# Patient Record
Sex: FEMALE | Race: WHITE | NOT HISPANIC OR LATINO | Employment: OTHER | URBAN - METROPOLITAN AREA
[De-identification: names, ages, dates, MRNs, and addresses within clinical notes are randomized per-mention and may not be internally consistent; named-entity substitution may affect disease eponyms.]

---

## 2017-01-02 ENCOUNTER — HOSPITAL ENCOUNTER (OUTPATIENT)
Dept: CT IMAGING | Facility: CLINIC | Age: 82
Discharge: HOME | End: 2017-01-02
Attending: INTERNAL MEDICINE | Admitting: INTERNAL MEDICINE
Payer: MEDICARE

## 2017-01-02 ENCOUNTER — OFFICE VISIT (OUTPATIENT)
Dept: INTERNAL MEDICINE | Facility: CLINIC | Age: 82
End: 2017-01-02
Payer: MEDICARE

## 2017-01-02 VITALS
DIASTOLIC BLOOD PRESSURE: 78 MMHG | OXYGEN SATURATION: 96 % | HEIGHT: 58 IN | TEMPERATURE: 97.6 F | BODY MASS INDEX: 24.98 KG/M2 | WEIGHT: 119 LBS | SYSTOLIC BLOOD PRESSURE: 128 MMHG | HEART RATE: 58 BPM

## 2017-01-02 DIAGNOSIS — R10.84 ABDOMINAL PAIN, GENERALIZED: ICD-10-CM

## 2017-01-02 DIAGNOSIS — R10.84 ABDOMINAL PAIN, GENERALIZED: Primary | ICD-10-CM

## 2017-01-02 DIAGNOSIS — D72.829 LEUKOCYTOSIS, UNSPECIFIED TYPE: ICD-10-CM

## 2017-01-02 DIAGNOSIS — E22.2 SIADH (SYNDROME OF INAPPROPRIATE ADH PRODUCTION) (H): ICD-10-CM

## 2017-01-02 LAB
ALBUMIN SERPL-MCNC: 3.4 G/DL (ref 3.4–5)
ALBUMIN UR-MCNC: NEGATIVE MG/DL
ALP SERPL-CCNC: 131 U/L (ref 40–150)
ALT SERPL W P-5'-P-CCNC: 18 U/L (ref 0–50)
ANION GAP SERPL CALCULATED.3IONS-SCNC: 10 MMOL/L (ref 3–14)
APPEARANCE UR: CLEAR
AST SERPL W P-5'-P-CCNC: 18 U/L (ref 0–45)
BASOPHILS # BLD AUTO: 0.1 10E9/L (ref 0–0.2)
BASOPHILS NFR BLD AUTO: 0.8 %
BILIRUB SERPL-MCNC: 0.4 MG/DL (ref 0.2–1.3)
BILIRUB UR QL STRIP: NEGATIVE
BUN SERPL-MCNC: 7 MG/DL (ref 7–30)
CALCIUM SERPL-MCNC: 9.7 MG/DL (ref 8.5–10.1)
CHLORIDE SERPL-SCNC: 94 MMOL/L (ref 94–109)
CO2 SERPL-SCNC: 28 MMOL/L (ref 20–32)
COLOR UR AUTO: YELLOW
CREAT SERPL-MCNC: 0.6 MG/DL (ref 0.52–1.04)
DIFFERENTIAL METHOD BLD: ABNORMAL
EOSINOPHIL # BLD AUTO: 0.5 10E9/L (ref 0–0.7)
EOSINOPHIL NFR BLD AUTO: 3.4 %
ERYTHROCYTE [DISTWIDTH] IN BLOOD BY AUTOMATED COUNT: 13.8 % (ref 10–15)
GFR SERPL CREATININE-BSD FRML MDRD: ABNORMAL ML/MIN/1.7M2
GLUCOSE SERPL-MCNC: 100 MG/DL (ref 70–99)
GLUCOSE UR STRIP-MCNC: NEGATIVE MG/DL
HCT VFR BLD AUTO: 41.7 % (ref 35–47)
HGB BLD-MCNC: 13.4 G/DL (ref 11.7–15.7)
HGB UR QL STRIP: NEGATIVE
KETONES UR STRIP-MCNC: NEGATIVE MG/DL
LEUKOCYTE ESTERASE UR QL STRIP: ABNORMAL
LYMPHOCYTES # BLD AUTO: 1.9 10E9/L (ref 0.8–5.3)
LYMPHOCYTES NFR BLD AUTO: 14.1 %
MCH RBC QN AUTO: 29.6 PG (ref 26.5–33)
MCHC RBC AUTO-ENTMCNC: 32.1 G/DL (ref 31.5–36.5)
MCV RBC AUTO: 92 FL (ref 78–100)
MONOCYTES # BLD AUTO: 1.6 10E9/L (ref 0–1.3)
MONOCYTES NFR BLD AUTO: 11.8 %
MUCOUS THREADS #/AREA URNS LPF: PRESENT /LPF
NEUTROPHILS # BLD AUTO: 9.6 10E9/L (ref 1.6–8.3)
NEUTROPHILS NFR BLD AUTO: 69.9 %
NITRATE UR QL: NEGATIVE
PH UR STRIP: 7.5 PH (ref 5–7)
PLATELET # BLD AUTO: 385 10E9/L (ref 150–450)
POTASSIUM SERPL-SCNC: 4.1 MMOL/L (ref 3.4–5.3)
PROT SERPL-MCNC: 8.1 G/DL (ref 6.8–8.8)
RBC # BLD AUTO: 4.53 10E12/L (ref 3.8–5.2)
RBC #/AREA URNS AUTO: ABNORMAL /HPF (ref 0–2)
SODIUM SERPL-SCNC: 132 MMOL/L (ref 133–144)
SP GR UR STRIP: 1.01 (ref 1–1.03)
URN SPEC COLLECT METH UR: ABNORMAL
UROBILINOGEN UR STRIP-ACNC: 0.2 EU/DL (ref 0.2–1)
WBC # BLD AUTO: 13.7 10E9/L (ref 4–11)
WBC #/AREA URNS AUTO: ABNORMAL /HPF (ref 0–2)

## 2017-01-02 PROCEDURE — 85025 COMPLETE CBC W/AUTO DIFF WBC: CPT | Performed by: INTERNAL MEDICINE

## 2017-01-02 PROCEDURE — 25500064 ZZH RX 255 OP 636: Performed by: INTERNAL MEDICINE

## 2017-01-02 PROCEDURE — 36415 COLL VENOUS BLD VENIPUNCTURE: CPT | Performed by: INTERNAL MEDICINE

## 2017-01-02 PROCEDURE — 99215 OFFICE O/P EST HI 40 MIN: CPT | Performed by: INTERNAL MEDICINE

## 2017-01-02 PROCEDURE — 81001 URINALYSIS AUTO W/SCOPE: CPT | Performed by: INTERNAL MEDICINE

## 2017-01-02 PROCEDURE — 25000125 ZZHC RX 250: Performed by: INTERNAL MEDICINE

## 2017-01-02 PROCEDURE — 74177 CT ABD & PELVIS W/CONTRAST: CPT

## 2017-01-02 PROCEDURE — 80053 COMPREHEN METABOLIC PANEL: CPT | Performed by: INTERNAL MEDICINE

## 2017-01-02 RX ORDER — IOPAMIDOL 755 MG/ML
58 INJECTION, SOLUTION INTRAVASCULAR ONCE
Status: COMPLETED | OUTPATIENT
Start: 2017-01-02 | End: 2017-01-02

## 2017-01-02 RX ADMIN — SODIUM CHLORIDE 57 ML: 9 INJECTION, SOLUTION INTRAVENOUS at 13:14

## 2017-01-02 RX ADMIN — IOPAMIDOL 58 ML: 755 INJECTION, SOLUTION INTRAVENOUS at 13:14

## 2017-01-02 NOTE — PROGRESS NOTES
SUBJECTIVE:                                                    Steffany Brown is a 86 year old female who presents to clinic today for the following health issues:      Concern - Stomach is painful to the touch     Onset: 2 months     Description:   When she touches her stomach it is painful no internal symptoms just external    Intensity: mild    Progression of Symptoms:  same    Accompanying Signs & Symptoms:  none       Previous history of similar problem:   none    Precipitating factors:   Worsened by: Touching    Alleviating factors:  Improved by: none    Abdominal pain on both sides for past several weeks.   Did not want to tell me about it when last seen.   BMs formed, once per day, no diarrhea, no hematochezia, no melena.  No gas production  Discomfort/pain across entire abdomen, present all day, does not vary throughout the day.   No nausea, no vomiting, no GERD.   No fevers, no chills.   NO changes in urination.     No changes with food or eating.  No changes with passing gas, or BMs.    No changes with tylenol, TUMS,   Has not had this pain before.   Sx do not cause her to alter her day or prevent her from normal activities.   Already limited due to the knee.           Therapies Tried and outcome: none          Problem list and histories reviewed & adjusted, as indicated.  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     Other speech disturbance 3/05     chronic dysarthria since CVI     Other and unspecified hyperlipidemia      Hip  fracture, intertrochanteric (H) 9/10/09     left hip fracture, S/P ORIF     Psoriasis      History of small bowel obstruction ,      Eczema      Pelvic mass in female 10/3/11     s/p BONIFACIO/BSO; pathology:  Serous cystadenofibroma, 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     Family history of colon cancer      mother  from colon cancer age 89     Age-related macular degeneration, dry, both eyes 10/24/14     per [facility]       Past Surgical History:  ---------------------------  Past Surgical History   Procedure Laterality Date     Hc colonoscopy thru stoma w biopsy/cautery tumor/polyp/lesion  3/00     colonoscopy adenamatous polyp ([facility])     Stress [place])       stress echo (negative)     C appendectomy       Surgical history of -   3/12/05     attempted angioplasty/stent of right middle cerebral artery ( M1 segment), no effective     Rectal surgery  1971     anterior laproscopic repair of sigmoid prolapse     Small intestine surgery       Colonoscopy  03     Diverticulosis, single small polyp removed (at [facility])     Colonoscopy  08     diverticulosis, few scattered mild angioectasias, no polyps     Fracture tx, hip rt/lt  9/10/09     Open reduction with IM nailing of left hip fracture using a gamma nail     Hysterectomy, bonifacio  10/03/11     s/p BONIFACIO/BSO to remove pelvic mass; pathology:  Serous cystadenofibroma, 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     D & c       Colonoscopy  2013     Procedure: COLONOSCOPY;  COLONOSCOPY ;  Surgeon: Romario Watters MD;  Location:  GI     Open reduction internal fixation ankle  2014     Procedure: OPEN REDUCTION INTERNAL FIXATION ANKLE;  Surgeon: Emmanuel Gomez MD;  Location:  OR     Open reduction internal fixation hip nailing Right 2016     Procedure: OPEN REDUCTION INTERNAL FIXATION HIP NAILING;  Surgeon: Jeronimo Giordano MD;  Location:  OR       Current Medications:  ---------------------------  Current  Outpatient Prescriptions   Medication Sig Dispense Refill     amoxicillin-clavulanate (AUGMENTIN) 875-125 MG per tablet Take 1 tablet by mouth 2 times daily for 10 days 20 tablet 0     clopidogrel (PLAVIX) 75 MG tablet Take 1 tablet (75 mg) by mouth daily 90 tablet 1     acetaminophen (TYLENOL) 500 MG tablet Take 1,000 mg by mouth 4 times daily       cholecalciferol 2000 UNITS CAPS Take 2 capsules by mouth 2 times daily        Multiple Vitamins-Minerals (PRESERVISION AREDS) CAPS Take 1 capsule by mouth 2 times daily        lovastatin (MEVACOR) 40 MG tablet Take 1 tablet (40 mg) by mouth At Bedtime 90 tablet 3     atenolol (TENORMIN) 50 MG tablet Take 1 tablet (50 mg) by mouth daily 90 tablet 3     fluticasone (FLONASE) 50 MCG/ACT nasal spray Spray 2 sprays into both nostrils daily as needed for rhinitis 16 g 5     loratadine (CLARITIN) 10 MG tablet Take 10 mg by mouth daily         Allergies:  -------------  Allergies   Allergen Reactions     Atorvastatin Calcium Cramps     Leg cramps     Lisinopril Swelling and Rash       Social History:  -------------------  Social History     Social History     Marital Status: Single     Spouse Name: N/A     Number of Children: 0     Years of Education: N/A     Occupational History     [employer]     Social History Main Topics     Smoking status: Never Smoker      Smokeless tobacco: Never Used      Comment: pt quit smoking in      Alcohol Use: Yes      Comment: rare     Drug Use: No     Sexual Activity: No     Other Topics Concern     Not on file     Social History Narrative    Living arrangements - the patient lives with her friendKamini       Family Medical History:  ------------------------------  Family History   Problem Relation Age of Onset     CANCER Mother       of colon ca age 89     Cancer - colorectal Mother      Cardiovascular Father       at age 65     CANCER Brother       of lung ca age 65         ROS:  REVIEW  "OF SYSTEMS:    RESP: negative for cough, dyspnea, wheezing, hemoptysis  CV: negative for chest pain, palpitations, PND, FENG, orthopnea  GI: negative for dysphagia, N/V, melena, diarrhea and constipation  CONSTITUTIONAL:negative for fever, chills, sweats, weight loss  EYES: negative for double vision, pain, blurring  ENT/MOUTH: negative for congestion, rhinorrhea, PND, ear pain, sore throat  : negative for dysuria, polyuria, hematuria, genital d/c  MUSCULOSKELATAL: negative for arthralgias, joint swelling or stiffness, weakness, muscular weakness  INTEGUMENTARY/SKIN: negative for new rashes, pruritus, scaling, alopecia  NEURO: negative for numbness/tingling, paralysis, incoordination or weakness  ENDOCRINE: negative for goiter, cold or heat intolerance, polydipsia, night sweats.     OBJECTIVE:                                                    /78 mmHg  Pulse 58  Temp(Src) 97.6  F (36.4  C) (Oral)  Ht 4' 10\" (1.473 m)  Wt 119 lb (53.978 kg)  BMI 24.88 kg/m2  SpO2 96%     GENERAL alert and no distress  EYES:  Normal sclera,conjunctiva, EOMI  HENT: oral and posterior pharynx without lesions or erythema, facies symmetric  NECK: Neck supple. No LAD, without thyroidmegaly or JVD., Carotids without bruits.  RESP: Clear to ausculation bilaterally without wheezes or crackles. Normal BS in all fields.  CV: RRR normal S1S2 without murmurs, rubs or gallops. PMI normal  LYMPH: no cervical lymph adenopathy appreciated  MS: extremities- no gross deformities of the visible extremities noted, no edema  PSYCH: Alert and oriented times 3; speech- coherent  SKIN:  No obvious significant skin lesions on visible portions of face  ABD:  Soft, diffuse mild tenderness across entire abdomen, no rebound, no distention.  Normal bowel sounds    LABS:  Results for orders placed or performed in visit on 01/02/17   CBC with platelets and differential   Result Value Ref Range    WBC 13.7 (H) 4.0 - 11.0 10e9/L    RBC Count 4.53 3.8 - " 5.2 10e12/L    Hemoglobin 13.4 11.7 - 15.7 g/dL    Hematocrit 41.7 35.0 - 47.0 %    MCV 92 78 - 100 fl    MCH 29.6 26.5 - 33.0 pg    MCHC 32.1 31.5 - 36.5 g/dL    RDW 13.8 10.0 - 15.0 %    Platelet Count 385 150 - 450 10e9/L    Diff Method Automated Method     % Neutrophils 69.9 %    % Lymphocytes 14.1 %    % Monocytes 11.8 %    % Eosinophils 3.4 %    % Basophils 0.8 %    Absolute Neutrophil 9.6 (H) 1.6 - 8.3 10e9/L    Absolute Lymphocytes 1.9 0.8 - 5.3 10e9/L    Absolute Monocytes 1.6 (H) 0.0 - 1.3 10e9/L    Absolute Eosinophils 0.5 0.0 - 0.7 10e9/L    Absolute Basophils 0.1 0.0 - 0.2 10e9/L   Comprehensive metabolic panel (BMP + Alb, Alk Phos, ALT, AST, Total. Bili, TP)   Result Value Ref Range    Sodium 132 (L) 133 - 144 mmol/L    Potassium 4.1 3.4 - 5.3 mmol/L    Chloride 94 94 - 109 mmol/L    Carbon Dioxide 28 20 - 32 mmol/L    Anion Gap 10 3 - 14 mmol/L    Glucose 100 (H) 70 - 99 mg/dL    Urea Nitrogen 7 7 - 30 mg/dL    Creatinine 0.60 0.52 - 1.04 mg/dL    GFR Estimate >90  Non  GFR Calc   >60 mL/min/1.7m2    GFR Estimate If Black >90   GFR Calc   >60 mL/min/1.7m2    Calcium 9.7 8.5 - 10.1 mg/dL    Bilirubin Total 0.4 0.2 - 1.3 mg/dL    Albumin 3.4 3.4 - 5.0 g/dL    Protein Total 8.1 6.8 - 8.8 g/dL    Alkaline Phosphatase 131 40 - 150 U/L    ALT 18 0 - 50 U/L    AST 18 0 - 45 U/L   *UA reflex to Microscopic and Culture ([facility] and [facility] (except [place] and [place])   Result Value Ref Range    Color Urine Yellow     Appearance Urine Clear     Glucose Urine Negative NEG mg/dL    Bilirubin Urine Negative NEG    Ketones Urine Negative NEG mg/dL    Specific Gravity Urine 1.010 1.003 - 1.035    Blood Urine Negative NEG    pH Urine 7.5 (H) 5.0 - 7.0 pH    Protein Albumin Urine Negative NEG mg/dL    Urobilinogen Urine 0.2 0.2 - 1.0 EU/dL    Nitrite Urine Negative NEG    Leukocyte Esterase Urine Small (A) NEG    Source Midstream Urine    Urine Microscopic    Result Value Ref Range    WBC Urine O - 2 0 - 2 /HPF    RBC Urine O - 2 0 - 2 /HPF    Mucous Urine Present (A) NEG /LPF       ABD CT scan (spoek with radiologist):  No obvious cause for her pain, no cancer, no abnormal masses.      ASSESSMENT/PLAN:                                                      (R10.84) Abdominal pain, generalized  (primary encounter diagnosis)  Comment: Unclear reason for her discomfort.    Extensive possible differential diagnoses.   Labs normal , except mildly elevated WBC with left shift, but minimal symptoms for infection  ABD Ct showed nothing significant.  The patient was most worried about colon cancer, givne her mothers colon cancer at age 89.   It is posible this could be mild case of smoldering diverticulitis, despite negative CT scan.   WIll treat with course of augmentin for next 10 days.   Also ask her to take tylenolol on regular basis.   Told to contact me if any worsening and with update regardless in 10-14 days.   Plan: CBC with platelets and differential,         Comprehensive metabolic panel (BMP + Alb, Alk         Phos, ALT, AST, Total. 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 Bili, TP)            (D72.829) Leukocytosis, unspecified type  Comment:   Plan: amoxicillin-clavulanate (AUGMENTIN) 875-125 MG         per tablet               See Patient Instructions    TONY SINGH M.D., MD  [facility]     Spent greater than 45 minutes with pt and partner, greater than 50% of time was educational and counseling.

## 2017-01-02 NOTE — NURSING NOTE
"Chief Complaint   Patient presents with     Pain     stomach is painful to the touch       Initial /78 mmHg  Pulse 58  Temp(Src) 97.6  F (36.4  C) (Oral)  Ht 4' 10\" (1.473 m)  Wt 119 lb (53.978 kg)  BMI 24.88 kg/m2  SpO2 96% Estimated body mass index is 24.88 kg/(m^2) as calculated from the following:    Height as of this encounter: 4' 10\" (1.473 m).    Weight as of this encounter: 119 lb (53.978 kg).  BP completed using cuff size: regular    "

## 2017-01-11 ENCOUNTER — APPOINTMENT (OUTPATIENT)
Dept: CT IMAGING | Facility: CLINIC | Age: 82
End: 2017-01-11
Attending: EMERGENCY MEDICINE
Payer: MEDICARE

## 2017-01-11 ENCOUNTER — APPOINTMENT (OUTPATIENT)
Dept: GENERAL RADIOLOGY | Facility: CLINIC | Age: 82
End: 2017-01-11
Attending: EMERGENCY MEDICINE
Payer: MEDICARE

## 2017-01-11 ENCOUNTER — HOSPITAL ENCOUNTER (OUTPATIENT)
Facility: CLINIC | Age: 82
Setting detail: OBSERVATION
Discharge: HOME | End: 2017-01-12
Attending: EMERGENCY MEDICINE | Admitting: INTERNAL MEDICINE
Payer: MEDICARE

## 2017-01-11 DIAGNOSIS — M79.604 PAIN OF RIGHT LOWER EXTREMITY: ICD-10-CM

## 2017-01-11 DIAGNOSIS — R10.31 RLQ ABDOMINAL PAIN: ICD-10-CM

## 2017-01-11 DIAGNOSIS — M25.571 ACUTE RIGHT ANKLE PAIN: Primary | ICD-10-CM

## 2017-01-11 DIAGNOSIS — R26.2 INABILITY TO WALK: ICD-10-CM
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
## 2017-01-11 LAB
ALBUMIN SERPL-MCNC: 3.4 G/DL (ref 3.4–5)
ALBUMIN UR-MCNC: NEGATIVE MG/DL
ALP SERPL-CCNC: 110 U/L (ref 40–150)
ALT SERPL W P-5'-P-CCNC: 15 U/L (ref 0–50)
ANION GAP SERPL CALCULATED.3IONS-SCNC: 7 MMOL/L (ref 3–14)
APPEARANCE UR: CLEAR
APTT PPP: 35 SEC (ref 22–37)
AST SERPL W P-5'-P-CCNC: 23 U/L (ref 0–45)
BASOPHILS # BLD AUTO: 0.1 10E9/L (ref 0–0.2)
BASOPHILS NFR BLD AUTO: 0.4 %
BILIRUB DIRECT SERPL-MCNC: <0.1 MG/DL (ref 0–0.2)
BILIRUB SERPL-MCNC: 0.4 MG/DL (ref 0.2–1.3)
BILIRUB UR QL STRIP: NEGATIVE
BUN SERPL-MCNC: 7 MG/DL (ref 7–30)
CALCIUM SERPL-MCNC: 9.6 MG/DL (ref 8.5–10.1)
CHLORIDE SERPL-SCNC: 97 MMOL/L (ref 94–109)
CO2 SERPL-SCNC: 27 MMOL/L (ref 20–32)
COLOR UR AUTO: YELLOW
CREAT SERPL-MCNC: 0.57 MG/DL (ref 0.52–1.04)
DIFFERENTIAL METHOD BLD: ABNORMAL
EOSINOPHIL # BLD AUTO: 0.5 10E9/L (ref 0–0.7)
EOSINOPHIL NFR BLD AUTO: 3.1 %
ERYTHROCYTE [DISTWIDTH] IN BLOOD BY AUTOMATED COUNT: 13.2 % (ref 10–15)
GFR SERPL CREATININE-BSD FRML MDRD: ABNORMAL ML/MIN/1.7M2
GLUCOSE SERPL-MCNC: 105 MG/DL (ref 70–99)
GLUCOSE UR STRIP-MCNC: NEGATIVE MG/DL
HCT VFR BLD AUTO: 40.4 % (ref 35–47)
HGB BLD-MCNC: 13.9 G/DL (ref 11.7–15.7)
HGB UR QL STRIP: NEGATIVE
IMM GRANULOCYTES # BLD: 0.1 10E9/L (ref 0–0.4)
IMM GRANULOCYTES NFR BLD: 0.5 %
INR PPP: 0.98 (ref 0.86–1.14)
KETONES UR STRIP-MCNC: NEGATIVE MG/DL
LEUKOCYTE ESTERASE UR QL STRIP: NEGATIVE
LYMPHOCYTES # BLD AUTO: 1.7 10E9/L (ref 0.8–5.3)
LYMPHOCYTES NFR BLD AUTO: 11 %
MCH RBC QN AUTO: 30.6 PG (ref 26.5–33)
MCHC RBC AUTO-ENTMCNC: 34.4 G/DL (ref 31.5–36.5)
MCV RBC AUTO: 89 FL (ref 78–100)
MONOCYTES # BLD AUTO: 1.8 10E9/L (ref 0–1.3)
MONOCYTES NFR BLD AUTO: 11.9 %
NEUTROPHILS # BLD AUTO: 11 10E9/L (ref 1.6–8.3)
NEUTROPHILS NFR BLD AUTO: 73.1 %
NITRATE UR QL: NEGATIVE
NRBC # BLD AUTO: 0 10*3/UL
NRBC BLD AUTO-RTO: 0 /100
PH UR STRIP: 7.5 PH (ref 5–7)
PLATELET # BLD AUTO: 355 10E9/L (ref 150–450)
POTASSIUM SERPL-SCNC: 4.1 MMOL/L (ref 3.4–5.3)
PROT SERPL-MCNC: 8 G/DL (ref 6.8–8.8)
RBC # BLD AUTO: 4.54 10E12/L (ref 3.8–5.2)
SODIUM SERPL-SCNC: 131 MMOL/L (ref 133–144)
SP GR UR STRIP: 1.01 (ref 1–1.03)
URN SPEC COLLECT METH UR: ABNORMAL
UROBILINOGEN UR STRIP-ACNC: 0.2 EU/DL (ref 0.2–1)
WBC # BLD AUTO: 15.1 10E9/L (ref 4–11)

## 2017-01-11 PROCEDURE — 73560 X-RAY EXAM OF KNEE 1 OR 2: CPT | Mod: RT

## 2017-01-11 PROCEDURE — 99285 EMERGENCY DEPT VISIT HI MDM: CPT | Mod: 25

## 2017-01-11 PROCEDURE — 80048 BASIC METABOLIC PNL TOTAL CA: CPT | Performed by: EMERGENCY MEDICINE

## 2017-01-11 PROCEDURE — A9270 NON-COVERED ITEM OR SERVICE: HCPCS | Mod: GY | Performed by: INTERNAL MEDICINE

## 2017-01-11 PROCEDURE — 85730 THROMBOPLASTIN TIME PARTIAL: CPT | Performed by: EMERGENCY MEDICINE

## 2017-01-11 PROCEDURE — 93005 ELECTROCARDIOGRAM TRACING: CPT

## 2017-01-11 PROCEDURE — 25500064 ZZH RX 255 OP 636: Performed by: EMERGENCY MEDICINE

## 2017-01-11 PROCEDURE — G0378 HOSPITAL OBSERVATION PER HR: HCPCS

## 2017-01-11 PROCEDURE — 85610 PROTHROMBIN TIME: CPT | Performed by: EMERGENCY MEDICINE

## 2017-01-11 PROCEDURE — 85025 COMPLETE CBC W/AUTO DIFF WBC: CPT | Performed by: EMERGENCY MEDICINE

## 2017-01-11 PROCEDURE — 74177 CT ABD & PELVIS W/CONTRAST: CPT

## 2017-01-11 PROCEDURE — 25000125 ZZHC RX 250: Performed by: EMERGENCY MEDICINE

## 2017-01-11 PROCEDURE — 72170 X-RAY EXAM OF PELVIS: CPT

## 2017-01-11 PROCEDURE — 99220 ZZC INITIAL OBSERVATION CARE,LEVL III: CPT | Performed by: INTERNAL MEDICINE

## 2017-01-11 PROCEDURE — 51798 US URINE CAPACITY MEASURE: CPT

## 2017-01-11 PROCEDURE — 72100 X-RAY EXAM L-S SPINE 2/3 VWS: CPT

## 2017-01-11 PROCEDURE — 25000132 ZZH RX MED GY IP 250 OP 250 PS 637: Mod: GY | Performed by: INTERNAL MEDICINE

## 2017-01-11 PROCEDURE — 73610 X-RAY EXAM OF ANKLE: CPT | Mod: RT

## 2017-01-11 PROCEDURE — 80076 HEPATIC FUNCTION PANEL: CPT | Performed by: EMERGENCY MEDICINE

## 2017-01-11 PROCEDURE — 70450 CT HEAD/BRAIN W/O DYE: CPT

## 2017-01-11 PROCEDURE — 81003 URINALYSIS AUTO W/O SCOPE: CPT | Performed by: EMERGENCY MEDICINE

## 2017-01-11 PROCEDURE — 71020 XR CHEST 2 VW: CPT

## 2017-01-11 RX ORDER — LIDOCAINE 40 MG/G
CREAM TOPICAL
Status: DISCONTINUED | OUTPATIENT
Start: 2017-01-11 | End: 2017-01-12 | Stop reason: HOSPADM

## 2017-01-11 RX ORDER — ONDANSETRON 4 MG/1
4 TABLET, ORALLY DISINTEGRATING ORAL EVERY 6 HOURS PRN
Status: DISCONTINUED | OUTPATIENT
Start: 2017-01-11 | End: 2017-01-12 | Stop reason: HOSPADM

## 2017-01-11 RX ORDER — IOPAMIDOL 755 MG/ML
64 INJECTION, SOLUTION INTRAVASCULAR ONCE
Status: COMPLETED | OUTPATIENT
Start: 2017-01-11 | End: 2017-01-11

## 2017-01-11 RX ORDER — NALOXONE HYDROCHLORIDE 0.4 MG/ML
.1-.4 INJECTION, SOLUTION INTRAMUSCULAR; INTRAVENOUS; SUBCUTANEOUS
Status: DISCONTINUED | OUTPATIENT
Start: 2017-01-11 | End: 2017-01-12 | Stop reason: HOSPADM

## 2017-01-11 RX ORDER — CLOPIDOGREL BISULFATE 75 MG/1
75 TABLET ORAL DAILY
Status: DISCONTINUED | OUTPATIENT
Start: 2017-01-12 | End: 2017-01-12 | Stop reason: HOSPADM

## 2017-01-11 RX ORDER — ACETAMINOPHEN 325 MG/1
650 TABLET ORAL EVERY 4 HOURS PRN
Status: DISCONTINUED | OUTPATIENT
Start: 2017-01-11 | End: 2017-01-12 | Stop reason: HOSPADM

## 2017-01-11 RX ORDER — ONDANSETRON 2 MG/ML
4 INJECTION INTRAMUSCULAR; INTRAVENOUS EVERY 6 HOURS PRN
Status: DISCONTINUED | OUTPATIENT
Start: 2017-01-11 | End: 2017-01-12 | Stop reason: HOSPADM

## 2017-01-11 RX ORDER — PROCHLORPERAZINE MALEATE 5 MG
5 TABLET ORAL EVERY 6 HOURS PRN
Status: DISCONTINUED | OUTPATIENT
Start: 2017-01-11 | End: 2017-01-12 | Stop reason: HOSPADM

## 2017-01-11 RX ORDER — PROCHLORPERAZINE 25 MG
12.5 SUPPOSITORY, RECTAL RECTAL EVERY 12 HOURS PRN
Status: DISCONTINUED | OUTPATIENT
Start: 2017-01-11 | End: 2017-01-12 | Stop reason: HOSPADM

## 2017-01-11 RX ADMIN — SODIUM CHLORIDE 58 ML: 9 INJECTION, SOLUTION INTRAVENOUS at 20:45

## 2017-01-11 RX ADMIN — IOPAMIDOL 64 ML: 755 INJECTION, SOLUTION INTRAVENOUS at 20:45

## 2017-01-11 RX ADMIN — AMOXICILLIN AND CLAVULANATE POTASSIUM 1 TABLET: 875; 125 TABLET, FILM COATED ORAL at 23:19

## 2017-01-11 ASSESSMENT — ENCOUNTER SYMPTOMS: BACK PAIN: 1

## 2017-01-11 NOTE — ED AVS SNAPSHOT
MRN:[id number]                      After Visit Summary   1/11/2017    Steffany Brown    MRN: [id number]           Thank you!     Thank you for choosing [facility] for your care. Our goal is always to provide you with excellent care. Hearing back from our patients is one way we can continue to improve our services. Please take a few minutes to complete the written survey that you may receive in the mail after you visit with us. Thank you!        Patient Information     Date Of Birth          8/24/1930        About your hospital stay     You were admitted on:  January 11, 2017 [facility]    You were discharged on:  January 12, 2017        Reason for your hospital stay       You were here for evaluation of ankle pain. Xray was negative for signs of a fracture and your mobility improved throughout admission.                  Who to Call     For medical emergencies, please call 911.  For non-urgent questions about your medical care, please call your primary care provider or clinic, [phone]          Attending Provider     Provider    Jon Molina DO Rumicho, Kuwe Edossa, MD       Primary Care Provider Office Phone # Fax #    Leo Hancock [phone]       [facility] [address] [facility] [address]        After Care Instructions     Activity       Your activity upon discharge: activity as tolerated            Diet       Follow this diet upon discharge: Orders Placed This Encounter  Regular Diet Adult              Discharge Instructions       Use ice and tylenol for pain management at home.   Try using the bathroom on a schedule every 3 hours or so to keep toileting regular.                  Follow-up Appointments     Follow-up and recommended labs and tests       Follow up with primary care provider, Leo Hancock within 7 days for hospital follow up.                  Your next 10  appointments already scheduled     Jan 18, 2017  9:20 AM   Office Visit with Leo Hancock MD   [facility] ([facility])    [address] [address] [phone]           Bring a current list of meds and any records pertaining to this visit.  For Physicals, please bring immunization records and any forms needing to be filled out.  Please arrive 10 minutes early to complete paperwork.              Additional Services     Home Care PT Referral for Hospital Discharge       PT to eval and treat    Your provider has ordered home care - physical therapy. If you have not been contacted within 2 days of your discharge please call the department phone number listed on the top of this document.                  Pending Results     No orders found for last 2 day(s).            Admission Information        Provider Department Dept Phone    1/11/2017 Arron Paris MD [facility] [phone]      Your Vitals Were     Blood Pressure Temperature Respirations    152/66 mmHg 96.2  F (35.7  C) (Oral) 18    Height Weight BMI (Body Mass Index)    1.524 m (5') 58.514 kg (129 lb) 25.19 kg/m2    Pulse Oximetry          92%        MyChart Information     [facility] gives you secure access to your electronic health record. If you see a primary care provider, you can also send messages to your care team and make appointments. If you have questions, please call your primary care clinic.  If you do not have a primary care provider, please call [phone] and they will assist you.        Care EveryWhere ID     This is your Care EveryWhere ID. This could be used by other organizations to access your [place] medical records  [id number]           Review of your medicines      CONTINUE these medicines which have NOT CHANGED        Dose / Directions    acetaminophen 500 MG tablet   Commonly known as:  TYLENOL        Dose:  500-1000 mg   Take 500-1,000 mg  by mouth every 6 hours as needed   Refills:  0       amoxicillin-clavulanate 875-125 MG per tablet   Commonly known as:  AUGMENTIN   Used for:  Abdominal pain, generalized, Leukocytosis, unspecified type        Dose:  1 tablet   Take 1 tablet by mouth 2 times daily for 10 days   Quantity:  20 tablet   Refills:  0       atenolol 50 MG tablet   Commonly known as:  TENORMIN   Used for:  Benign essential hypertension   Notes to Patient:  Resume taking as you do at home        Dose:  50 mg   Take 1 tablet (50 mg) by mouth daily   Quantity:  90 tablet   Refills:  3       cholecalciferol 2000 UNITS Caps   Notes to Patient:  Resume taking as you do at home          Dose:  2 capsule   Take 2 capsules by mouth 2 times daily   Refills:  0       clopidogrel 75 MG tablet   Commonly known as:  PLAVIX   Used for:  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)        Dose:  75 mg   Take 1 tablet (75 mg) by mouth daily   Quantity:  90 tablet   Refills:  1       fluticasone 50 MCG/ACT spray   Commonly known as:  FLONASE   Used for:  Other seasonal allergic rhinitis        Dose:  2 spray   Spray 2 sprays into both nostrils daily as needed for rhinitis   Quantity:  16 g   Refills:  5       loratadine 10 MG tablet   Commonly known as:  CLARITIN   Notes to Patient:  Resume taking as you do at home          Dose:  10 mg   Take 10 mg by mouth daily   Refills:  0       lovastatin 40 MG tablet   Commonly known as:  MEVACOR   Used for:  Hyperlipidemia LDL goal <100   Notes to Patient:  Resume taking as you do at home          Dose:  40 mg   Take 1 tablet (40 mg) by mouth At Bedtime   Quantity:  90 tablet   Refills:  3       PRESERVISION AREDS Caps   Used for:  Benign essential hypertension   Notes to Patient:  Resume taking as you do at home          Dose:  1 capsule   Take 1 capsule by mouth 2 times daily   Refills:  0                Protect others around you: Learn how to safely use, store and throw away  your medicines at www.disposemymeds.org.             Medication List: This is a list of all your medications and when to take them. Check marks below indicate your daily home schedule. Keep this list as a reference.      Medications           Morning Afternoon Evening Bedtime As Needed    acetaminophen 500 MG tablet   Commonly known as:  TYLENOL   Take 500-1,000 mg by mouth every 6 hours as needed   Last time this was given:  650 mg on 1/12/2017  8:33 AM                                   amoxicillin-clavulanate 875-125 MG per tablet   Commonly known as:  AUGMENTIN   Take 1 tablet by mouth 2 times daily for 10 days   Last time this was given:  1 tablet on 1/12/2017  8:35 AM                    Today 1/12               atenolol 50 MG tablet   Commonly known as:  TENORMIN   Take 1 tablet (50 mg) by mouth daily   Notes to Patient:  Resume taking as you do at home                                cholecalciferol 2000 UNITS Caps   Take 2 capsules by mouth 2 times daily   Notes to Patient:  Resume taking as you do at home                                  clopidogrel 75 MG tablet   Commonly known as:  PLAVIX   Take 1 tablet (75 mg) by mouth daily   Last time this was given:  75 mg on 1/12/2017  8:35 AM            Tomorrow 1/13                       fluticasone 50 MCG/ACT spray   Commonly known as:  FLONASE   Spray 2 sprays into both nostrils daily as needed for rhinitis                                   loratadine 10 MG tablet   Commonly known as:  CLARITIN   Take 10 mg by mouth daily   Notes to Patient:  Resume taking as you do at home                                  lovastatin 40 MG tablet   Commonly known as:  MEVACOR   Take 1 tablet (40 mg) by mouth At Bedtime   Notes to Patient:  Resume taking as you do at home                                  PRESERVISION AREDS Caps   Take 1 capsule by mouth 2 times daily   Notes to Patient:  Resume taking as you do at home

## 2017-01-11 NOTE — ED PROVIDER NOTES
History     Chief Complaint:  Fall    HPI provided by caretaker/room mate.    HPI   Steffany Brown is a 86 year old female with a history of stroke in 2005 with right sided deficit and expressive aphasia currently on Plavix who presents with complaint of bilateral hip pain, low back pain, upper buttock pain, right knee pain, and right ankle pain secondary to mechanical fall. The patient fell 6 days ago and has had difficulty walking and using her walker due to pain. The patient does not remember whether she hit her head during the fall episode. The patient's caretaker reports that the patient may have injured her tailbone as well and reports that she has chronic knee pain. She has had increasing difficulty walking and generalized weakness since the fall. Lives with roommate.    Allergies:  Atorvastatin Ca - cramps  Lisinopril - swelling, rash     Medications:    Amoxicillin-clavulanate (AUGMENTIN) 875-125 MG per tablet  Clopidogrel (PLAVIX) 75 MG tablet  Acetaminophen (TYLENOL) 500 MG tablet  Cholecalciferol 2000 UNITS CAPS  Multiple Vitamins-Minerals (PRESERVISION AREDS) CAPS  Lovastatin (MEVACOR) 40 MG tablet  Atenolol (TENORMIN) 50 MG tablet  Fluticasone (FLONASE) 50 MCG/ACT nasal spray  Loratadine (CLARITIN) 10 MG tablet     Past Medical History:    Rheumatoid arthritis(714.0)   Osteoporosis, unspecified   Unspecified essential hypertension   Personal history of colonic polyps   Diverticulosis of colon (without mention of hemorrhage)   Other generalized ischemic cerebrovascular disease   Other speech disturbance   Other and unspecified hyperlipidemia   Hip fracture, intertrochanteric  Psoriasis  History of small bowel obstruction   Eczema  Pelvic mass in female   Family history of colon cancer  Age-related macular degeneration, dry, both eyes    Past Surgical History:    C appendectomy   Surgical history of - attempted angioplasty/stent of right middle cerebral artery ( M1 segment), no effective  Rectal  surgery   Small intestine surgery   Colonoscopy - Diverticulosis, single small polyp removed (at [facility])  Colonoscopy - diverticulosis, few scattered mild angioectasias, no polyps  Fracture tx, hip rt/lt - Open reduction with IM nailing of left hip fracture using a gamma nail  Hysterectomy, bonifacio   D&C   Open reduction internal fixation ankle   Open reduction internal fixation hip nailing    Family History:    Mother: Cancer - colorectal  Father: Cardiovascular  Brother: Lung cancer    Social History:  Marital Status: Single  Presents to the ED with friends  Alcohol Use: Rare  PCP: Leo Hancock     Review of Systems   Musculoskeletal: Positive for back pain (lower back).        Positive for bilateral hip pain.  Positive for right knee pain.  Positive for right ankle pain.   All other systems reviewed and are negative.    Physical Exam   First Vitals:  BP: (!) 148/102 mmHg  Heart Rate: 82  Temp: 97.4  F (36.3  C)  Resp: 17  Height: 152.4 cm (5')  Weight: 58.514 kg (129 lb)  SpO2: 95 %    Physical Exam  General: Alert and cooperative with exam. Patient in mild distress. Significant expressive apasia  Head:  Scalp is NC/AT  Eyes:  No scleral icterus, PERRL, EOMI  ENT:  The external nose and ears are normal. The oropharynx is normal and without erythema; mucus membranes are moist.   Neck:  Normal range of motion without rigidity.   CV:  Regular rate and rhythm    No pathologic murmur   Resp:  Breath sounds are clear bilaterally    Non-labored, no retractions or accessory muscle use  GI:  Abdomen is soft, no distension; moderate tenderness to palpation in RLQ.   MS:  No lower extremity edema. Chronic deformity of right ankle. No significant TTP to hips or right knee.    No midline cervical or thoracic tenderness. Mild lumber tenderness w/o step-off  Skin:  Warm and dry, No rash or lesions noted.  Neuro: Expressive aphasia; chronic    Strength +4/5 RU/RLE, +5/5 LL/LUE; sensation grossly intact in all  4 extremities. Cranial nerves 2-12 intact. GCS: 15    Emergency Department Course   ECG:  @ 1820  Indication: Fall  Vent. Rate 73 bpm. MN interval 194 ms. QRS duration 120 ms. QT/QTc 422/464 ms. P-R-T axis 51 -33 82.   Normal sinus rhythm. Left axis deviation. Possible Anterior infarct, age undetermined. Abnormal ECG.  No significant change when compared to previous ECG from 07/20/2016   Read @ 1830 by Dr. Molina.      Imaging:  Radiographic findings were communicated with the patient who voiced understanding of the findings.      CT Abdomen Pelvis w Contrast   Preliminary Result   IMPRESSION:   1. Vascular calcifications, including coronary artery calcifications.   2. Moderate to marked distention of the urinary bladder.   3. No acute-appearing abnormality in the abdomen or pelvis or   significant change since the prior exam.      Pelvis XR, 1-2 views   Final Result   IMPRESSION:    1. No acute fractures are identified.   2. Postop changes are seen in both femurs.      LIT SY MD      Lumbar spine XR, 2-3 views   Final Result   IMPRESSION:      1. No fractures are identified.   2. Degenerative disc disease at L4-5 and L5-S1. This was also seen on   the previous CT from 2004.      LIT SY MD      Knee XR, 1-2 views, right   Final Result   IMPRESSION: No acute fractures are identified.      LIT SY MD      CT Head w/o Contrast   Final Result   IMPRESSION:    1. No intracranial bleed. No skull fractures are seen.   2. Atrophy of the brain.  White matter changes consistent with small   vessel ischemic disease.      LIT SY MD      Chest XR,  PA & LAT   Final Result   IMPRESSION: No active infiltrates are identified.          LIT SY MD      Ankle XR, G/E 3 views, right   Final Result   IMPRESSION: Postop changes. No acute fractures are identified.      LIT SY MD          All Readings Per Radiology Unless Otherwise Noted.    Laboratory:  CBC: WBC 15.1 (H), HGB 13.9,   BMP:  (L), Glc 105  (H), Rest WNL (Creatinine 0.57)  (1812) INR: 0.98  (1812) PTT: 35  Hepatic panel: Bili Direct <0.1, Bili Total 0.4, Albumin 3.4, Protein Total 8.0, Alkphos 110, ALT 15, AST 23.    UA: Clear yellow urine, pH 7.5 (H), otherwise WNL    ED Course:  Nursing notes and past medical history reviewed.   I performed a physical examination of the patient as documented above.  I explained the plan with the patient who consents to this.  EKG was done, interpretation as above.  The patient was sent for a CT head, pelvis x-ray, chest x-ray, ankle x-ray, knee x-ray, lumbar spine x-ray, and CT abdomen pelvis while in the emergency department, findings above.  Blood was drawn from the patient. This was sent for laboratory testing, findings above.   Urine sample was obtained and sent for laboratory analysis, findings above.  Findings and plan explained to the patient who consents to admission.   (2158) I discussed the patient with Dr. Paris of the hospitalist service, who will admit the patient to a observation bed for further monitoring, evaluation, and treatment.      Impression & Plan    Medical Decision Making:  The patient is a 86 year old female with history of stroke in 2005 who is anticoagulated on Plavix, present status post fall 6 days ago with right lower quadrant pain and right leg/back pain. Patient's medical history and records were reviewed. Initial consideration for, but not limited to, intracranial bleed/pathology, soft tissue injury, fracture, dislocation, infectious process, electrolyte abnormality, arrhythmia, among other. Labs, EKG, imaging was obtained. EKG (-). UA obtained and unremarkable. Labs unremarkable with exception of elevated WBC (15.1). CT of the abdomen pelvis shows moderate bladder distension without other significant findings. Patient did have a large void after the scan however, did continue to have greater than 300 cc's on bladder scan. Bladder was drained with straight cath. Patient reports no  significant improvement in pain with urine drainage. Imaging of the hip/knee/ankle/chest without significant findings. Head CT negative. In the ED, patient was unable to ambulate unassisted where she had been previously. As she lives with a roommate and is unable to care for herself at home. Cause of patient's right lower quadrant pain is undetermined at this time; patient is status post appendectomy. Pain only seemed to be present on palpation; possibly musculoskeletal. The patient will be admitted to observation with the hospitalist service for further evaluation and care.    Diagnosis:    ICD-10-CM    1. RLQ abdominal pain R10.31    2. Pain of right lower extremity M79.604    3. Inability to walk R26.2    4. Fall, initial encounter W19.XXXA        Disposition:   Admit to observation.      Lidia MARQUEZ, am serving as a scribe on 1/11/2017 at 6:14 PM to personally document services performed by Jon Molina DO, based on my observations and the provider's statements to me.      Jon Molina DO  01/12/17 0134

## 2017-01-11 NOTE — ED AVS SNAPSHOT
[facility] Observation Unit    [address] [address]    Phone:  [phone]                                       Steffany Brown   MRN: [id number]    Department:  [facility]   Date of Visit:  1/11/2017           After Visit Summary Signature Page     I have received my discharge instructions, and my questions have been answered. I have discussed any challenges I see with this plan with the nurse or doctor.    ..........................................................................................................................................  Patient/Patient Representative Signature      ..........................................................................................................................................  Patient Representative Print Name and Relationship to Patient    ..................................................               ................................................  Date                                            Time    ..........................................................................................................................................  Reviewed by Signature/Title    ...................................................              ..............................................  Date                                                            Time

## 2017-01-12 ENCOUNTER — APPOINTMENT (OUTPATIENT)
Dept: PHYSICAL THERAPY | Facility: CLINIC | Age: 82
End: 2017-01-12
Attending: INTERNAL MEDICINE
Payer: MEDICARE

## 2017-01-12 VITALS
DIASTOLIC BLOOD PRESSURE: 66 MMHG | TEMPERATURE: 96.2 F | WEIGHT: 129 LBS | RESPIRATION RATE: 18 BRPM | SYSTOLIC BLOOD PRESSURE: 152 MMHG | OXYGEN SATURATION: 92 % | BODY MASS INDEX: 25.32 KG/M2 | HEIGHT: 60 IN

## 2017-01-12 LAB
ANION GAP SERPL CALCULATED.3IONS-SCNC: 7 MMOL/L (ref 3–14)
BASOPHILS # BLD AUTO: 0.1 10E9/L (ref 0–0.2)
BASOPHILS NFR BLD AUTO: 0.7 %
BUN SERPL-MCNC: 6 MG/DL (ref 7–30)
CALCIUM SERPL-MCNC: 9 MG/DL (ref 8.5–10.1)
CHLORIDE SERPL-SCNC: 98 MMOL/L (ref 94–109)
CO2 SERPL-SCNC: 27 MMOL/L (ref 20–32)
CREAT SERPL-MCNC: 0.54 MG/DL (ref 0.52–1.04)
DIFFERENTIAL METHOD BLD: ABNORMAL
EOSINOPHIL # BLD AUTO: 0.6 10E9/L (ref 0–0.7)
EOSINOPHIL NFR BLD AUTO: 4.9 %
ERYTHROCYTE [DISTWIDTH] IN BLOOD BY AUTOMATED COUNT: 13.3 % (ref 10–15)
GFR SERPL CREATININE-BSD FRML MDRD: ABNORMAL ML/MIN/1.7M2
GLUCOSE SERPL-MCNC: 92 MG/DL (ref 70–99)
HCT VFR BLD AUTO: 38.3 % (ref 35–47)
HGB BLD-MCNC: 13.2 G/DL (ref 11.7–15.7)
IMM GRANULOCYTES # BLD: 0 10E9/L (ref 0–0.4)
IMM GRANULOCYTES NFR BLD: 0.3 %
INTERPRETATION ECG - MUSE: NORMAL
LYMPHOCYTES # BLD AUTO: 1.7 10E9/L (ref 0.8–5.3)
LYMPHOCYTES NFR BLD AUTO: 14.6 %
MCH RBC QN AUTO: 30.6 PG (ref 26.5–33)
MCHC RBC AUTO-ENTMCNC: 34.5 G/DL (ref 31.5–36.5)
MCV RBC AUTO: 89 FL (ref 78–100)
MONOCYTES # BLD AUTO: 1.9 10E9/L (ref 0–1.3)
MONOCYTES NFR BLD AUTO: 16.2 %
NEUTROPHILS # BLD AUTO: 7.3 10E9/L (ref 1.6–8.3)
NEUTROPHILS NFR BLD AUTO: 63.3 %
NRBC # BLD AUTO: 0 10*3/UL
NRBC BLD AUTO-RTO: 0 /100
PLATELET # BLD AUTO: 339 10E9/L (ref 150–450)
PLATELET # BLD EST: NORMAL 10*3/UL
POTASSIUM SERPL-SCNC: 4 MMOL/L (ref 3.4–5.3)
RBC # BLD AUTO: 4.31 10E12/L (ref 3.8–5.2)
SODIUM SERPL-SCNC: 132 MMOL/L (ref 133–144)
WBC # BLD AUTO: 11.5 10E9/L (ref 4–11)

## 2017-01-12 PROCEDURE — 36415 COLL VENOUS BLD VENIPUNCTURE: CPT | Performed by: INTERNAL MEDICINE

## 2017-01-12 PROCEDURE — 40000193 ZZH STATISTIC PT WARD VISIT

## 2017-01-12 PROCEDURE — 80048 BASIC METABOLIC PNL TOTAL CA: CPT | Performed by: INTERNAL MEDICINE

## 2017-01-12 PROCEDURE — 25000132 ZZH RX MED GY IP 250 OP 250 PS 637: Mod: GY | Performed by: INTERNAL MEDICINE

## 2017-01-12 PROCEDURE — 97161 PT EVAL LOW COMPLEX 20 MIN: CPT | Mod: GP

## 2017-01-12 PROCEDURE — G0378 HOSPITAL OBSERVATION PER HR: HCPCS

## 2017-01-12 PROCEDURE — 85025 COMPLETE CBC W/AUTO DIFF WBC: CPT | Performed by: INTERNAL MEDICINE

## 2017-01-12 PROCEDURE — A9270 NON-COVERED ITEM OR SERVICE: HCPCS | Mod: GY | Performed by: INTERNAL MEDICINE

## 2017-01-12 PROCEDURE — 99217 ZZC OBSERVATION CARE DISCHARGE: CPT | Performed by: PHYSICIAN ASSISTANT

## 2017-01-12 RX ADMIN — AMOXICILLIN AND CLAVULANATE POTASSIUM 1 TABLET: 875; 125 TABLET, FILM COATED ORAL at 08:35

## 2017-01-12 RX ADMIN — ACETAMINOPHEN 650 MG: 325 TABLET, FILM COATED ORAL at 08:33

## 2017-01-12 RX ADMIN — CLOPIDOGREL BISULFATE 75 MG: 75 TABLET, FILM COATED ORAL at 08:35

## 2017-01-12 RX ADMIN — ACETAMINOPHEN 650 MG: 325 TABLET, FILM COATED ORAL at 00:22

## 2017-01-12 NOTE — H&P
PRIMARY CARE PHYSICIAN:  Leo Hancock MD      History is obtained from the patient's caretaker as well as her roommate due to patient's expressive aphasia.  The patient is able to contribute some as well.      HISTORY OF PRESENT ILLNESS:   Steffany Brown is an 86-year-old female with past medical history of CVA with right-sided hemiparesis and expressive aphasia and hypertension who is presenting for evaluation of right ankle pain after a recent fall.  According to her roommate patient had sustained a fall about a week ago which was unwitnessed.  At that time she had fallen on her left side but patient was able to get up and walk around without any problems using her walker.  Two days ago, the patient started complaining of  ankle pain on her right.  Since yesterday patient is worried about weightbearing on her right ankle due to pain in her right ankle.  She does not remember falling in her right side either.  The patient denies any other trauma other than her recent fall about a week ago.  The patient denies any fevers, chills, cough, shortness of breath, nausea, vomiting.  The patient does report some abdominal discomfort all over.  Otherwise, she denies any diarrhea.  Given this concern, the patient was not able to ambulate as usual with walker, her roommate was concerned and decided to bring her here for evaluation.  Of note, the patient was seen by her primary care physician on 1/2/2017 due to abdominal discomfort which is more tender with palpation.  At that time she had a CAT scan of her abdomen and pelvis which did not show any acute abnormality.  Due to concern for possible developing diverticulitis she was prescribed Augmentin for 10 days at that time.      In the emergency room, the patient was evaluated by Dr. Molina.  Her vital signs were stable.  Her BMP showed a sodium of 131.  Otherwise, creatinine and BUN were normal range.  Repeat WBC is elevated to 15.1.  She had multiple imaging including  chest x-ray which was negative for acute abnormality.  She also had a pelvis x-ray, right ankle, right knee x-ray which were all negative for fracture.  She had a CT of her head which did not show acute intracranial abnormality.  Lumbar spinal x-ray was also negative for acute fracture, but did show degenerative disk disease at the level of L4-L5 and L5-S1 which is seen also previously.  Her CT of abdomen again did not show any acute abnormality other moderate to marked distention of the urinary bladder.  After CAT scan was obtained the patient apparently had a large void and postvoid residual was 375.  She was intermittently catheterized with normal level 400 mL.  The patient may have felt a little better from her abdominal tenderness standpoint otherwise no acute abnormality.  Given her right ankle pain and difficulty walking admission for observation was requested for further evaluation.      PAST MEDICAL HISTORY:   1.  History of CVA in 2005 with right-sided hemiparesis and expressive aphasia.   2.  Osteoporosis.   3.  Hypertension.   4.  Diverticulosis.   5.  Psoriasis.   6.  History of small-bowel obstruction in 1985 and 2011.   7.  Macular degeneration.      PAST SURGICAL HISTORY:   1.  History of open reduction and internal fixation of the left hip in 2009, right hip in 2016.   2.  Appendectomy.   3.  Total abdominal hysterectomy and bilateral salpingo-oophorectomy.   4.  Laparoscopic repair of sigmoid prolapse in 1971.   5.  D&C.      FAMILY HISTORY:  Reviewed and noncontributory to the current presentation.      SOCIAL HISTORY:  The patient lives with a roommate who is also her caretaker and her POA according to the roommate.  The patient typically uses a walker to ambulate and able to do her basic ADLs.  The patient denies current alcohol, tobacco or illicit drug use.      REVIEW OF SYSTEMS:  A 10-point review of systems is completed and is negative except as noted in the history of present illness.       ALLERGIES:  Atorvastatin and lisinopril.      PRIOR TO ADMISSION MEDICATIONS:  Have not yet been verified, but include:   1.  Tylenol as needed.   2.  Augmentin 2 times daily for 10 days, started on 1/2/2017.   3.  Atenolol daily.     4.  Plavix daily.   5.  Flonase daily as needed.   6.  Claritin daily.   7.  Lovastatin at bedtime.   8.  Multivitamins daily.      PHYSICAL EXAMINATION:   VITAL SIGNS:  Temperature 97.4, pulse 75, blood pressure 168/74, saturation 93% on room air.   GENERAL:  She is alert, awake, in no apparent distress.   HEENT:  Normocephalic, atraumatic.  Extraocular motions are intact.  Oral mucosa is moist.  No pharyngeal erythema or exudate.   NECK:  Supple.  No cervical lymphadenopathy.   CARDIOVASCULAR:  Regular rate and rhythm, no audible murmurs.   LUNGS:  Clear to auscultation bilaterally without wheezing or rhonchi.   ABDOMEN:  Soft, nondistended; however, patient feels tender whenever I touched everywhere and this was mild in nature.   EXTREMITIES:  Trace lower extremity edema.   MUSCULOSKELETAL:  No fusion bilateral knees.  She is able to flex both hips and extend the knees without any difficulty.  On her right ankle I do not see any effusion or any erythema around the joint.  She is able to dorsiflex and extend her ankle; however, she has some discomfort when she extends it.   NEUROLOGIC:  She has right-sided hemiparesis and some expressive aphasia.   SKIN:  Warm and dry, no obvious rashes noted.      LABORATORY DATA:  Basic metabolic panel shows sodium of 131, potassium 4.1, chloride 97, bicarbonate 27, BUN 7, creatinine 0.57.  LFTs are within normal range.  CBC showed WBC of 15.1, hemoglobin of 13.9, hematocrit 40 and platelets of 355,000.  Urinalysis is negative for nitrites and leukocyte esterase.      IMAGING:  CT abdomen which did not show acute abnormality; however, did show some urinary bladder distention.  The patient voided following imaging.      X-ray of the pelvis and  x-ray of the right knee and right ankle without evidence of any acute fracture.  CT of head did not show any acute abnormalities either.      ASSESSMENT AND PLAN:  The patient is an 86-year-old female with past medical history of CVA with right-sided hemiparesis and aphasia and hypertension who is presenting due to right ankle pain and difficulty walking secondary to pain.  She is currently being admitted for pain control and PT evaluation.   1.  Right ankle pain.  Unclear what is causing her right ankle pain.  This may be that she sprained it during her recent fall approximately a week ago.  Her ankle x-ray does not show any acute fracture.  She also had imaging of her right knee and pelvis which did not show any acute abnormality.  She does not have any joint effusions or any erythema around the joint making infectious process less likely.  At this point, we will admit under observation.  We will control her pain with Tylenol.  She does not wish to get any narcotic medications either.  We will obtain PT evaluation in the morning for safe discharge planning.   2.  Leukocytosis, unclear where her leukocytosis is coming from.  I do not see any obvious source of infection.  Chest x-ray is clear.  Her UA is also negative.  She does not have any clear infectious symptoms.  She is on Augmentin as an outpatient, which was started for presumed diverticulitis.  Her CAT scan did not and does not show any diverticulitis at this time.  At this point, we will continue her Augmentin to complete her course.  Will followup on CBC in the morning to see the trend.   3.  History of CVA with right-sided hemiparesis and aphasia.  Will continue prior to admission Plavix.  This can be resumed at time of discharge.   4.  History of hypertension.  Continue prior to admission atenolol once dosing is verified.   5.  Mild hyponatremia.  She has a history of SIADH and she has chronic hyponatremia with baseline sodium around 130-133.  She is  currently at her baseline at 131.   6.  Deep venous thrombosis prophylaxis:  This will be ambulation as anticipating a short stay.      CODE STATUS:  DNR/DNI verified with the patient and her caretaker.      DISPOSITION:  Anticipate discharge over the next 24 hours pending further PT evaluation and patient ability to ambulate with adequate pain control.         SHARON FLOYD MD             D: 2017 22:51   T: 2017 00:21   MT: [id number]      Name:     MARY MAO   MRN:      [id number]        Account:      [id number]   :      1930           Admitted:     [id number]      Document: [id number]       cc: Leo Hancock MD

## 2017-01-12 NOTE — PROGRESS NOTES
List all goals to be met before discharge home:     Pt evaluation in the morning: Not met, in AM    Nurse to notify MD when observation goals have been met and patient is ready for discharge.

## 2017-01-12 NOTE — PLAN OF CARE
Problem: Discharge Planning  Goal: Discharge Planning (Adult, OB, Behavioral, Peds)  SW:    Pt's goal is to d/c home with home PT - KE

## 2017-01-12 NOTE — PROGRESS NOTES
01/12/17 0949   Quick Adds   Type of Visit Initial PT Evaluation   Living Environment   Lives With other (see comments)  (caretaker Steffany)   Living Arrangements house  (rambler)   Home Accessibility stairs to enter home   Number of Stairs to Enter Home 3   Number of Stairs Within Home (doesn't use)   Self-Care   Usual Activity Tolerance fair   Current Activity Tolerance fair   Regular Exercise no   Equipment Currently Used at Home walker, rolling   Functional Level Prior   Ambulation 1-->assistive equipment   Transferring 1-->assistive equipment   Fall history within last six months yes   Number of times patient has fallen within last six months 1   Which of the above functional risks had a recent onset or change? ambulation;transferring   General Information   Onset of Illness/Injury or Date of Surgery - Date 01/11/17   Referring Physician Arron Paris MD   Patient/Family Goals Statement return home   Pertinent History of Current Problem (include personal factors and/or comorbidities that impact the POC) Admitted wiht a fall and R ankle pain. PMH: CVA wiht R hemiparesis, expressive aphasia.   Precautions/Limitations fall precautions   Weight-Bearing Status - LLE full weight-bearing   Weight-Bearing Status - RLE full weight-bearing   Cognitive Status Examination   Orientation other (see comments)  (cannot answer due to aphasia)   Level of Consciousness alert   Follows Commands and Answers Questions 100% of the time;able to follow single-step instructions   Personal Safety and Judgment intact   Pain Assessment   Patient Currently in Pain Yes, see Vital Sign flowsheet  (R medial ankle pain with WB, buttock pain)   Integumentary/Edema   Integumentary/Edema Comments No ankle edema or brusing   Posture    Posture Forward head position;Protracted shoulders;Kyphosis   Range of Motion (ROM)   ROM Quick Adds No deficits were identified   ROM Comment B LEs. R UE has a hand contracture but able to open to  walker.  "  Strength   Strength Comments R hip flex 2+/5, L hip flex 3+/5, R knee ext 3+/5, L knee ext 4/5, B DF 4/5   Bed Mobility   Bed Mobility Comments Pt up in chair, NT   Transfer Skills   Transfer Comments Sit to stand with CGA and FWW   Gait   Gait Comments Pt amb 30 ft with FWW and SBA   Balance   Balance Comments Balance at baseline per caregiver   General Therapy Interventions   Intervention Comments Defer to home PT   Clinical Impression   Criteria for Skilled Therapeutic Intervention evaluation only   Clinical Presentation Stable/Uncomplicated   Clinical Presentation Rationale Improved, able to ambulate   Clinical Decision Making (Complexity) Low complexity   Predicted Duration of Therapy Intervention (days/wks) eval only   Anticipated Discharge Disposition Home with Home Therapy   Risk & Benefits of therapy have been explained Yes   Patient, Family & other staff in agreement with plan of care Yes   [facility] TM \"6 Clicks\"   2016, Trustees of [facility], under license to [employer].  All rights reserved.   6 Clicks Short Forms Basic Mobility Inpatient Short Form   [facility]  \"6 Clicks\" V.2 Basic Mobility Inpatient Short Form   1. Turning from your back to your side while in a flat bed without using bedrails? 4 - None   2. Moving from lying on your back to sitting on the side of a flat bed without using bedrails? 3 - A Little   3. Moving to and from a bed to a chair (including a wheelchair)? 3 - A Little   4. Standing up from a chair using your arms (e.g., wheelchair, or bedside chair)? 3 - A Little   5. To walk in hospital room? 3 - A Little   6. Climbing 3-5 steps with a railing? 3 - A Little   Basic Mobility Raw Score (Score out of 24.Lower scores equate to lower levels of function) 19   Total Evaluation Time   Total Evaluation Time (Minutes) 20     "

## 2017-01-12 NOTE — ED NOTES
[facility]  ED Nurse Handoff Report    ED Chief complaint: Fall      ED Diagnosis:   Final diagnoses:   None       Code Status: DNR / DNI as of 7/2016    Allergies:   Allergies   Allergen Reactions     Atorvastatin Calcium Cramps     Leg cramps     Lisinopril Swelling and Rash       Activity level:  Total Care     Needed?: No    Isolation: No  Infection: Not Applicable    Bariatric?: No      Vital Signs:   Filed Vitals:    01/11/17 1756 01/11/17 1812 01/11/17 1816 01/11/17 1817   BP:   119/81 119/81   Temp:       TempSrc:       Resp:    18   Height:       Weight:       SpO2: 94% 94%  93%       Cardiac Rhythm: ,        Pain level: 0-10 Pain Scale: 1    Is this patient confused?: No    Patient Report: Initial Complaint:  The Pt is an 86 year old female with a history of stroke with right sided deficit and expressive aphasia currently on Plavix who presents with complaint of bilateral hip pain, low back pain, upper buttock pain, right knee pain, and right ankle pain secondary to mechanical fall. The patient fell 6 days ago and has had difficulty walking and using her walker due to increasing levels of pain.  Focused Assessment: The Pt is able to comprehend, but has significant difficulty with speaking. She is A&O x 4 and able to make her needs known. She reports worsening pain.  Tests Performed: XRAY's to rt ankle, chest PA & LAT, right knee and head CT.  Abnormal Results: imaging still pending  Treatments provided: IV, EKG Labwork    Family Comments: sister and friend at bedside    OBS brochure/video discussed/provided to patient: N/A    ED Medications: Medications - No data to display    Drips infusing?:  No      ED NURSE PHONE NUMBER: [id number]

## 2017-01-12 NOTE — PHARMACY-ADMISSION MEDICATION HISTORY
Admission medication history interview status for the 1/11/2017  admission is complete. See EPIC admission navigator for prior to admission medications     Medication history source reliability:Good    Actions taken by pharmacist (provider contacted, etc):None     Additional medication history information not noted on PTA med list :None    Medication reconciliation/reorder completed by provider prior to medication history? No    Time spent in this activity: 10 minutes    Prior to Admission medications    Medication Sig Last Dose Taking? Auth Provider   amoxicillin-clavulanate (AUGMENTIN) 875-125 MG per tablet Take 1 tablet by mouth 2 times daily for 10 days 1/11/2017 at am Yes Leo Hancock MD   clopidogrel (PLAVIX) 75 MG tablet Take 1 tablet (75 mg) by mouth daily 1/11/2017 at Unknown time Yes Leo Hancock MD   acetaminophen (TYLENOL) 500 MG tablet Take 500-1,000 mg by mouth every 6 hours as needed  1/11/2017 at Unknown time Yes Reported, Patient   cholecalciferol 2000 UNITS CAPS Take 2 capsules by mouth 2 times daily  1/11/2017 at am Yes Reported, Patient   Multiple Vitamins-Minerals (PRESERVISION AREDS) CAPS Take 1 capsule by mouth 2 times daily  1/11/2017 at am Yes Leo Hancock MD   lovastatin (MEVACOR) 40 MG tablet Take 1 tablet (40 mg) by mouth At Bedtime 1/10/2017 at Unknown time Yes Leo Hancock MD   atenolol (TENORMIN) 50 MG tablet Take 1 tablet (50 mg) by mouth daily 1/11/2017 at Unknown time Yes Leo Hancock MD   fluticasone (FLONASE) 50 MCG/ACT nasal spray Spray 2 sprays into both nostrils daily as needed for rhinitis prn Yes Leo Hancock MD   loratadine (CLARITIN) 10 MG tablet Take 10 mg by mouth daily 1/11/2017 at Unknown time Yes Reported, Patient

## 2017-01-12 NOTE — ED NOTES
Pt up to bedside commode pt c/o right sided pain pt voids large amounts. Bladder scan done after void shows 375ml. Report to Dr Molina

## 2017-01-12 NOTE — PROGRESS NOTES
List all goals to be met before discharge home:     Pt evaluation in the morning: Not met, pt just arrived on the floor from ED.    Nurse to notify MD when observation goals have been met and patient is ready for discharge.

## 2017-01-12 NOTE — PLAN OF CARE
Problem: Goal Outcome Summary  Goal: Goal Outcome Summary  A&Ox4. Up w/ 1-2a w/ walker to commode. Had fall 5 days ago. Xrays and head CT negative. C/o right ankle pain. Tylenol given at bedtime. Hx of TIA w/ residual right sided weakness and expressive aphasia. Pt to work with PT this AM. Uses walker at home.

## 2017-01-12 NOTE — ED NOTES
DR Molina placed a lumbar XRAY order in and Writer called XRAY to complete this study. The Pt is at CT currently and will return following lumbar films.

## 2017-01-12 NOTE — PLAN OF CARE
Problem: Goal Outcome Summary  Goal: Goal Outcome Summary  PT: Orders received, eval completed. Pt admitted under observation status with a fall, R ankle pain. Prior to admit pt was living with a caregiver in a 2 story house but stays on the main level, uses a FWW, independent with mobility but is only a household ambulator. Pt had a CVA with expressive aphasia and R sided weakness as well as R ankle limitations after a fracture at baseline. Pt Currently requires CGA sit to/from stand with FWW, CGA progressing to SBA for gait of 30 ft with FWW with mobility at baseline per her caregiver. Pt complains of mild pain only with movement of ankle and weight bearing but denies pain at rest. No bruising or edema noted. Pt demonstrates dec strength, balance, activity tolerance and difficulty ambulating and transferring and would benefit from skilled PT services in order to improve this. Recommend discharge to home with home PT. Caregiver Kamini in agreement with this plan. Defer further therapies to home setting. Pt has no further skilled inpatient PT needs at this time.

## 2017-01-12 NOTE — PROGRESS NOTES
Care Transition Initial Assessment - JANET     Met with: pt/caregiver    Active Problems:    Right ankle pain         DATA  Lives With: other (see comments) (caretaker Steffany)  Living Arrangements: house ([place])          Identified issues/concerns regarding health management: Needs transport  Patient feels that they have adequate support @ home?  Yes    PT assessed pt and recommends home with caregiver, Kamini, and home PT.  Per RN pt needs w/c transport at d/c.  JANET met with pt and caregiver and confirmed home address and explained that w/c transport is private pay and both stated they understood.  SW confirmed that there are 3 stairs to entering the home.  Per caregiver pt has used [facility] in the past and would like to use this service again.  SW sent referral.  JANET spoke with Jeronimo from  and arranged w/c transport at 1600.                     ASSESSMENT  Cognitive Status:  Alert  Concerns to be addressed: Pt would benefit from returning home with caregiver and home PT via w/c transport.     PLAN  Patient Goals and Preferences: Return home with caregiver and home PT  Patient anticipates discharging to:  home

## 2017-01-12 NOTE — PLAN OF CARE
Problem: Discharge Planning  Goal: Discharge Planning (Adult, OB, Behavioral, Peds)  Outcome: Adequate for Discharge Date Met:  01/12/17  Appears A&O, expressive aphasia- mostly able to answer yes or no questions. Up w/1 & walker. Tolerating regular diet, denies nausea. VSS on RA. C/o R ankle pain, given tylenol prn. Pt consult complete.     Pt given discharge instructions. Questions answered. Follow up appointment scheduled for 1/18, pt aware. Pt to DC home via HE w/c.

## 2017-01-13 ENCOUNTER — CARE COORDINATION (OUTPATIENT)
Dept: CARE COORDINATION | Facility: CLINIC | Age: 82
End: 2017-01-13

## 2017-01-13 ENCOUNTER — TELEPHONE (OUTPATIENT)
Dept: INTERNAL MEDICINE | Facility: CLINIC | Age: 82
End: 2017-01-13

## 2017-01-13 NOTE — Clinical Note
[facility]  [address] [place] [address]  Phone: [phone]      January 16, 2017      Steffany Brown  [address] [facility] [id number]    Dear Steffany,    We have been trying to reach you to introduce you to [facility] Care Coordination program.  The goal of care coordination is to help you manage your health and improve access to the [facility] system in the most efficient manner.  The Care Coordinator is a nurse who understands the healthcare system and will assist you in improving your access to care.     As your Physician and Care Coordinator we partner to help you achieve your health care goals.     We will continue to reach out; however, if you are able to call your Care Coordinator at [phone], that would be appreciated.  We at [facility] are focused on providing you with the highest-quality healthcare experience possible.      It is a pleasure to partner with you as we work towards achieving your optimal state of wellness.        Sincerely,        Leo Rodriguez RN, CCM    [facility] [address] [place] [id number]

## 2017-01-13 NOTE — DISCHARGE SUMMARY
[facility]    Discharge Summary  Hospitalist    Date of Admission:  1/11/2017  Date of Discharge:  1/12/2017  4:04 PM  Discharging Provider: Dejah Briggs PA-C  Date of Service (when I saw the patient): 01/12/2017    Discharge Diagnoses  Right ankle pain  Leukocytosis, improving  Hx CVA with aphasia and right-sided hemiparesis  Hypertension  Hyponatremia, chronic    History of Present Illness  Steffany Brown is an 86 year old female with past medical history of CVA with right-sided hemiparesis and aphasia and hypertension who presented with right ankle pain and difficulty with ambulation secondary to pain. The patient had a fall about a week ago at home and since that time had noticed pain in her right ankle which she had had surgery on in the past. She denied any additional trauma to the area. She was evaluated in the ED and admission was requested as she was unable to ambulate normally with her walker secondary to her pain. See H&P by Dr. Paris for additional information.     On day of discharge patient is feeling better. He caregiver if present and feels she is ambulating at baseline. Patient feels pain is better controlled with tylenol.     Hospital Course  Steffany Brown was admitted on 1/11/2017.  The following problems were addressed during her hospitalization:    Right ankle pain, possibly secondary to sprain. Patient had a fall at home one week ago and has had some discomfort with ambulation since that time. Xray of ankle, knee, and right hip negative for fracture. No erythema to suggest infectious process. Patient remained afebrile throughout admission. WBC initially elevated at 15.1, improved to 11.5 at discharge. Patient's pain was improved with tylenol and she was able to ambulate with a walker at baseline ability per her caretaker. PT recommended home PT which was ordered at discharge.     Leukocytosis, improved. WBC 15.1>11.5.No sign of acute infection. CXR clear and UA  unremarkable. As above, joint is without sign of infection. Patient was recently started on Augmentin for presumed diverticulitis based on clinical presentation on 1/2/17. No abdominal pain during admission and CT scan here is without evidence of diverticulitis. Augmentin was continued during admission, patient is to complete course.     Hx CVA with right-sided hemiparesis and aphasia. Continued on PTA plavix during admission.     Hx hypertension. Continued PTA atenolol during admission.     Mild hyponatremia, chronic. Patients baseline sodium appears to be around 130-132. Hx SIADH. Sodium at baseline throughout admission.   --follow up PCP    Urinary retention. CT scan noted distended bladder. Patient straight cath for 400mL x1. Patient voiding without difficulty remainder of admission. Per caregiver she seems to not realize when she needs to use the bathroom. Recommended toileting schedule at home to stay on top of this.     This patient was seen and examined with Dr. Dillon who agrees with the above plan.    Dejah Briggs PA-C    Significant Results and Procedures  See below     Code Status  DNR / DNI       Primary Care Physician  Leo Hancock    Physical Exam  Temp: 96.2  F (35.7  C) Temp src: Oral BP: 152/66 mmHg   Heart Rate: 76 Resp: 18 SpO2: 92 % O2 Device: None (Room air)    Filed Vitals:    01/11/17 1646   Weight: 58.514 kg (129 lb)     Vital Signs with Ranges  Temp:  [96.2  F (35.7  C)-96.9  F (36.1  C)] 96.2  F (35.7  C)  Heart Rate:  [74-76] 76  Resp:  [18] 18  BP: (152-172)/(66-77) 152/66 mmHg  SpO2:  [91 %-93 %] 92 %  I/O last 3 completed shifts:  In: 200 [P.O.:200]  Out: 150 [Urine:150]    Constitutional: Alert elderly female, sitting up in chair. Expressive aphasia so answers only yes or no questions.   ENT: normal cephalic, moist mucous membranes  Eyes:  Pupils are equal and reactive to light, EOMI  Respiratory: Lungs clear to auscultation bilaterally, no increased work of  breathing  Cardiovascular: Regular rate and rhythm, no murmur is appreciated  GI: Normoactive bowel sounds, abdomen soft and non-distended, no tenderness to palpation  Skin/Integumen: warm, dry  MSK:    right ankle without erythema or warmth. Very minimal edema around lateral malleolus. Able to flex and extend joint with some discomfort. Bilateral knees without effusion.   Neurologic:  Right-sided hemiparesis at baseline. Expressive aphasia able to answer yes or no. sensation intact.     Discharge Disposition  Discharged to home with assistance of care giver and home PT  Condition at discharge: Stable    Consultations This Hospital Stay  SOCIAL WORK IP CONSULT  PHYSICAL THERAPY ADULT IP CONSULT    Time Spent on This Encounter  IDejah, personally saw the patient today and spent greater than 30 minutes discharging this patient.    Discharge Orders    Home Care PT Referral for Hospital Discharge     Reason for your hospital stay   You were here for evaluation of ankle pain. Xray was negative for signs of a fracture and your mobility improved throughout admission.     Follow-up and recommended labs and tests   Follow up with primary care provider, Leo Hancock within 7 days for hospital follow up.     Activity   Your activity upon discharge: activity as tolerated     Discharge Instructions   Use ice and tylenol for pain management at home.   Try using the bathroom on a schedule every 3 hours or so to keep toileting regular.     MD face to face encounter   Documentation of Face to Face and Certification for Home Health Services    I certify that patient: Steffany Brown is under my care and that I, or a nurse practitioner or physician's assistant working with me, had a face-to-face encounter that meets the physician face-to-face encounter requirements with this patient on: 1/12/2017.    This encounter with the patient was in whole, or in part, for the following medical condition, which is the  primary reason for home health care: recent ankle sprain with ongoing mobility limitations.    I certify that, based on my findings, the following services are medically necessary home health services: Physical Therapy.    My clinical findings support the need for the above services because: Physical Therapy Services are needed to assess and treat the following functional impairments: recent ankle sprain, assist with mobility and strengthening .    Further, I certify that my clinical findings support that this patient is homebound (i.e. absences from home require considerable and taxing effort and are for medical reasons or [employer] services or infrequently or of short duration when for other reasons) because: Requires assistance of another person or specialized equipment to access medical services because patient: Requires supervision of another for safe transfer...    Based on the above findings. I certify that this patient is confined to the home and needs intermittent skilled nursing care, physical therapy and/or speech therapy.  The patient is under my care, and I have initiated the establishment of the plan of care.  This patient will be followed by a physician who will periodically review the plan of care.  Physician/Provider to provide follow up care: Leo Hancock    Attending [facility] physician (the Medicare certified [place] provider): Dr. Romario Dillon  Physician Signature: See electronic signature associated with these discharge orders.  Date: 1/12/2017     DNR/DNI     Diet   Follow this diet upon discharge: Orders Placed This Encounter  Regular Diet Adult         Discharge Medications  Discharge Medication List as of 1/12/2017  2:47 PM      CONTINUE these medications which have NOT CHANGED    Details   amoxicillin-clavulanate (AUGMENTIN) 875-125 MG per tablet Take 1 tablet by mouth 2 times daily for 10 days, Disp-20 tablet, R-0, E-Prescribe      clopidogrel (PLAVIX) 75 MG tablet Take 1 tablet  (75 mg) by mouth daily, Disp-90 tablet, R-1, E-PrescribePROFILE FOR FUTURE REFILLS UNTIL PATIENT CALLS FOR REFILLS      acetaminophen (TYLENOL) 500 MG tablet Take 500-1,000 mg by mouth every 6 hours as needed , Historical      cholecalciferol 2000 UNITS CAPS Take 2 capsules by mouth 2 times daily , Historical      Multiple Vitamins-Minerals (PRESERVISION AREDS) CAPS Take 1 capsule by mouth 2 times daily , Historical      lovastatin (MEVACOR) 40 MG tablet Take 1 tablet (40 mg) by mouth At Bedtime, Disp-90 tablet, R-3, E-PrescribePROFILE FOR FUTURE REFILLS UNTIL PATIENT CALLS FOR REFILLS      atenolol (TENORMIN) 50 MG tablet Take 1 tablet (50 mg) by mouth daily, Disp-90 tablet, R-3, E-PrescribePROFILE FOR FUTURE REFILLS UNTIL PATIENT CALLS FOR REFILLS      fluticasone (FLONASE) 50 MCG/ACT nasal spray Spray 2 sprays into both nostrils daily as needed for rhinitis, Disp-16 g, R-5, E-PrescribePROFILE FOR FUTURE REFILLS UNTIL PATIENT CALLS FOR REFILLS      loratadine (CLARITIN) 10 MG tablet Take 10 mg by mouth daily, Historical           Allergies  Allergies   Allergen Reactions     Atorvastatin Calcium Cramps     Leg cramps     Lisinopril Swelling and Rash     Data  Most Recent 3 CBC's:  Recent Labs   Lab Test  01/12/17   0557  01/11/17   1812 01/02/17   0914   WBC  11.5*  15.1*  13.7*   HGB  13.2  13.9  13.4   MCV  89  89  92   PLT  339  355  385      Most Recent 3 BMP's:  Recent Labs   Lab Test  01/12/17   0557  01/11/17 1812 01/02/17   0914   NA  132*  131*  132*   POTASSIUM  4.0  4.1  4.1   CHLORIDE  98  97  94   CO2  27  27  28   BUN  6*  7  7   CR  0.54  0.57  0.60   ANIONGAP  7  7  10   NICA  9.0  9.6  9.7   GLC  92  105*  100*     Most Recent 2 LFT's:  Recent Labs   Lab Test  01/11/17 1812 01/02/17   0914   AST  23  18   ALT  15  18   ALKPHOS  110  131   BILITOTAL  0.4  0.4     Most Recent INR's and Anticoagulation Dosing History:  Anticoagulation Dose History     Recent Dosing and Labs Latest Ref Rng  9/21/2009 9/23/2009 9/27/2009 9/29/2009 2/25/2011 7/10/2014 1/11/2017    INR 0.86 - 1.14 2.29(H) 2.14(H) 1.64(H) 1.82(H) 1.03 1.02 0.98        Most Recent 3 Troponin's:No lab results found.  Most Recent Cholesterol Panel:  Recent Labs   Lab Test  05/24/16   0738   CHOL  155   LDL  78   HDL  60   TRIG  86     Most Recent 6 Bacteria Isolates From Any Culture (See EPIC Reports for Culture Details):  Recent Labs   Lab Test  07/20/16   0758  06/30/14   2000  01/06/13   1625  02/25/11   0330  09/16/09   1100  09/13/09   1245   CULT  >100,000 colonies/mL Enterobacter cloacae complex*  >100,000 colonies/mL Escherichia coli*  No growth  10 to 50,000 colonies/mL Multiple species present, probable perineal  >100,000 colonies/mL Escherichia coli  No growth     Most Recent TSH, T4 and A1c Labs:  Recent Labs   Lab Test  09/06/16   0838   TSH  1.14     Results for orders placed or performed during the hospital encounter of 01/11/17   CT Head w/o Contrast    Narrative    CT HEAD W/O CONTRAST   1/11/2017 7:08 PM     HISTORY: fall, on plavix    TECHNIQUE: Axial images of the head without IV contrast material.  Radiation dose for this scan was reduced using automated exposure  control, adjustment of the mA and/or kV according to patient size, or  iterative reconstruction technique.    COMPARISON: MR scan dated 3/11/2005    FINDINGS:  There is generalized atrophy of the brain.  Areas of low  attenuation are present in the white matter of the cerebral  hemispheres that are consistent with small vessel ischemic disease in  this age patient. There is ex vacuo enlargement of the left lateral  ventricle. Chronic area of encephalomalacia is seen in the white  matter of the left frontal lobe. This is in the region of the  previously seen white matter infarct. There is no evidence of  intracranial hemorrhage, mass, acute infarct or anomaly. The  visualized portions of the sinuses and mastoids appear normal. No  intracranial bleed or skull  fractures are identified..      Impression    IMPRESSION:   1. No intracranial bleed. No skull fractures are seen.  2. Atrophy of the brain.  White matter changes consistent with small  vessel ischemic disease.    LIT SY MD   Pelvis XR, 1-2 views    Narrative    XR PELVIS 1/2 VW    1/11/2017 6:51 PM      HISTORY: fall, r/o fracture    COMPARISON: None.    FINDINGS:  There is normal osseous alignment.  No fractures are  identified.  Intramedullary rods are seen in both femurs. Healed  fractures of the proximal right and left femurs are noted. Heterotopic  bone is seen around the proximal right femur.      Impression    IMPRESSION:   1. No acute fractures are identified.  2. Postop changes are seen in both femurs.    LIT SY MD   Chest XR,  PA & LAT    Narrative    XR CHEST 2 VW   1/11/2017 6:52 PM     HISTORY: SOB    COMPARISON: Film dated 7/19/2016.    FINDINGS: The heart is negative.  The lungs are clear. The pulmonary  vasculature is normal.  The bones and soft tissues are unremarkable.      Impression    IMPRESSION: No active infiltrates are identified.        LIT SY MD   Ankle XR, G/E 3 views, right    Narrative    XR ANKLE RT G/E 3 VW  1/11/2017 6:54 PM     HISTORY:  fall, r/o fracture    COMPARISON: Known dated 6/30/2014    FINDINGS:  Region is status post internal fixation of the distal tibia  and fibula fractures. Degenerative changes seen in the ankle joint. No  acute fractures are identified.      Impression    IMPRESSION: Postop changes. No acute fractures are identified.    LIT SY MD   Knee XR, 1-2 views, right    Narrative    XR KNEE RT 1 /2 VW    1/11/2017 6:55 PM      HISTORY: fall r/o fracture    COMPARISON: None.    FINDINGS:  There is normal osseous alignment.  No fractures are  identified.  Intramedullary gemma is seen in the distal femur.  Degenerative changes seen in the patellofemoral and medial joint  compartments of the knee.      Impression    IMPRESSION: No acute fractures are  identified.    LIT SY MD   Lumbar spine XR, 2-3 views    Narrative    XR LUMBAR SPINE 2-3 VIEWS1/11/2017 7:19 PM     HISTORY:  fall, r/o fracture    COMPARISON: CT dated 3/29/2004.    FINDINGS: There is normal bony alignment.  No fractures are  identified.  There is loss of disc space height at L4-5 and L5-S1.      Impression    IMPRESSION:     1. No fractures are identified.  2. Degenerative disc disease at L4-5 and L5-S1. This was also seen on  the previous CT from 2004.    LIT SY MD   CT Abdomen Pelvis w Contrast    Narrative    CT ABDOMEN AND PELVIS WITH CONTRAST 1/11/2017 9:26 PM    HISTORY: Right lower quadrant pain.    TECHNIQUE: Helical axial scans from dome of liver through pubic  symphysis with 64mL [id number]-[id number] IV contrast. Radiation dose for this  scan was reduced using automated exposure control, adjustment of the  mA and/or kV according to patient size, or iterative reconstruction  technique.    COMPARISON: 1/2/2017.    FINDINGS: Vascular calcifications, including coronary artery  calcifications. The liver, spleen, pancreas, bilateral adrenal glands  and kidneys bilaterally are unremarkable and unchanged. The bowel and  mesentery in the upper abdomen are unremarkable. Postoperative changes  in the mid small bowel are again noted.    Scans through the pelvis show moderate to marked distention of the  urinary bladder, slightly greater than on the prior exam. The appendix  is not definitely identified, but there is no CT evidence for  appendicitis. No free fluid. Bilateral ORIF subtrochanteric proximal  femur fractures again noted.      Impression    IMPRESSION:  1. Vascular calcifications, including coronary artery calcifications.  2. Moderate to marked distention of the urinary bladder.  3. No acute-appearing abnormality in the abdomen or pelvis or  significant change since the prior exam.    ADAMARIS CHENEY MD

## 2017-01-16 NOTE — PROGRESS NOTES
Clinic Care Coordination Contact  [facility]/Voicemail    Referral Source: ED Follow-Up    Clinical Data: Care Coordinator Outreach - sprained ankle      Outreach attempted x 2.  Left message on voicemail with call back information and requested return call.    Plan: Care Coordinator mailed unable to reach letter. Will await response.  Care Coordinator will do no further outreaches at this time.    Gay Ahmadi RN, CCM - Care Coordinator     1/16/2017    1:06 PM  [phone]

## 2017-01-18 ENCOUNTER — TELEPHONE (OUTPATIENT)
Dept: INTERNAL MEDICINE | Facility: CLINIC | Age: 82
End: 2017-01-18

## 2017-01-18 NOTE — TELEPHONE ENCOUNTER
Reason for Call:  Other     Detailed comments: Please call Renu(homecare PT) for verbal order    Phone Number Patient can be reached at: Other phone number:  [phone]    Best Time:     Can we leave a detailed message on this number?     Call taken on 1/18/2017 at 1:37 PM by EDMUND MUNOZ

## 2017-01-18 NOTE — TELEPHONE ENCOUNTER
Renu PT with [facility] requests the following orders:  PT twice a week x3 wks for gait and transfer training, instruction in home exercise program, falls prevention plan, monitor pain and skin integrity.  Order approved per RN protocol.  CHRIS Medina R.N.

## 2017-01-24 ENCOUNTER — OFFICE VISIT (OUTPATIENT)
Dept: INTERNAL MEDICINE | Facility: CLINIC | Age: 82
End: 2017-01-24
Payer: MEDICARE

## 2017-01-24 VITALS
TEMPERATURE: 97.4 F | DIASTOLIC BLOOD PRESSURE: 72 MMHG | SYSTOLIC BLOOD PRESSURE: 128 MMHG | OXYGEN SATURATION: 94 % | HEART RATE: 73 BPM

## 2017-01-24 DIAGNOSIS — E22.2 SIADH (SYNDROME OF INAPPROPRIATE ADH PRODUCTION) (H): ICD-10-CM

## 2017-01-24 DIAGNOSIS — M25.571 ACUTE RIGHT ANKLE PAIN: Primary | ICD-10-CM

## 2017-01-24 PROCEDURE — 99213 OFFICE O/P EST LOW 20 MIN: CPT | Performed by: INTERNAL MEDICINE

## 2017-01-24 NOTE — PROGRESS NOTES
SUBJECTIVE:                                                    Steffany Brown is a 86 year old female who presents to clinic today for the following health issues:      ED/UC Followup:    Facility:  [facility]  Date of visit:   Reason for visit: fell from walker  Current Status: getting better    Twisted right ankle when falling with the walker, caught her foot under the walker and could not move it in time.  Normally uses walker fine.   Able to bear weight on leg and foot.           Did not do weight or height due to not being able to stand.     Problem list and histories reviewed & adjusted, as indicated.  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     Other speech disturbance 3/05     chronic dysarthria since CVI     Other and unspecified hyperlipidemia      Hip fracture, intertrochanteric (H) 9/10/09     left hip fracture, S/P ORIF     Psoriasis      History of small bowel obstruction ,      Eczema      Pelvic mass in female 10/3/11     s/p LEODAN/BSO; pathology:  Serous cystadenofibroma, 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     Family history of colon cancer      mother  from colon cancer age 89     Age-related macular degeneration, dry, both eyes 10/24/14     per [facility]       Past Surgical History:  ---------------------------  Past Surgical History   Procedure Laterality Date     Hc colonoscopy thru stoma w biopsy/cautery tumor/polyp/lesion  3/00     colonoscopy adenamatous polyp ([facility])     [place]        stress echo (negative)     C appendectomy  1985     Surgical history of -   3/12/05     attempted angioplasty/stent of right middle cerebral artery ( M1 segment), no effective     Rectal surgery  1971     anterior laproscopic repair of sigmoid prolapse     Small intestine surgery       Colonoscopy  5/14/03     Diverticulosis, single small polyp removed (at [facility])     Colonoscopy  6/26/08     diverticulosis, few scattered mild angioectasias, no polyps     Fracture tx, hip rt/lt  9/10/09     Open reduction with IM nailing of left hip fracture using a gamma nail     Hysterectomy, bonifacio  10/03/11     s/p BONIFACIO/BSO to remove pelvic mass; pathology:  Serous cystadenofibroma, 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     D & c       Colonoscopy  7/17/2013     Procedure: COLONOSCOPY;  COLONOSCOPY ;  Surgeon: Romario Watters MD;  Location:  GI     Open reduction internal fixation ankle  7/11/2014     Procedure: OPEN REDUCTION INTERNAL FIXATION ANKLE;  Surgeon: Emmanuel Gomez MD;  Location:  OR     Open reduction internal fixation hip nailing Right 7/20/2016     Procedure: OPEN REDUCTION INTERNAL FIXATION HIP NAILING;  Surgeon: Jeronimo Giordano MD;  Location:  OR       Current Medications:  ---------------------------  Current Outpatient Prescriptions   Medication Sig Dispense Refill     clopidogrel (PLAVIX) 75 MG tablet Take 1 tablet (75 mg) by mouth daily 90 tablet 1     acetaminophen (TYLENOL) 500 MG tablet Take 500-1,000 mg by mouth every 6 hours as needed        cholecalciferol 2000 UNITS CAPS Take 2 capsules by mouth 2 times daily        Multiple Vitamins-Minerals (PRESERVISION AREDS) CAPS Take 1 capsule by mouth 2 times daily        lovastatin (MEVACOR) 40 MG tablet Take 1 tablet (40 mg) by mouth At Bedtime 90 tablet 3     atenolol (TENORMIN) 50 MG tablet Take 1 tablet (50 mg) by mouth daily 90 tablet 3     fluticasone (FLONASE) 50 MCG/ACT nasal spray Spray 2 sprays into both nostrils daily as needed  for rhinitis 16 g 5     loratadine (CLARITIN) 10 MG tablet Take 10 mg by mouth daily         Allergies:  -------------  Allergies   Allergen Reactions     Atorvastatin Calcium Cramps     Leg cramps     Lisinopril Swelling and Rash       Social History:  -------------------  Social History     Social History     Marital Status: Single     Spouse Name: N/A     Number of Children: 0     Years of Education: N/A     Occupational History      Other     [employer]     Social History Main Topics     Smoking status: Never Smoker      Smokeless tobacco: Never Used      Comment: pt quit smoking in 194     Alcohol Use: Yes      Comment: rare     Drug Use: No     Sexual Activity: No     Other Topics Concern     Not on file     Social History Narrative    Living arrangements - the patient lives with her friend, Kamini       Family Medical History:  ------------------------------  Family History   Problem Relation Age of Onset     CANCER Mother       of colon ca age 89     Cancer - colorectal Mother      Cardiovascular Father       at age 65     CANCER Brother       of lung ca age 65         ROS:  REVIEW OF SYSTEMS:    RESP: negative for cough, dyspnea, wheezing, hemoptysis  CV: negative for chest pain, palpitations, PND, FENG, orthopnea  GI: negative for dysphagia, N/V, pain, melena, diarrhea and constipation  NEURO: negative for numbness/tingling, paralysis, incoordination, or focal weakness     OBJECTIVE:                                                    /72 mmHg  Pulse 73  Temp(Src) 97.4  F (36.3  C) (Oral)  SpO2 94%     GENERAL alert and no distress  EYES:  Normal sclera,conjunctiva, EOMI  HENT: oral and posterior pharynx without lesions or erythema, facies symmetric  NECK: Neck supple. No LAD, without thyroidmegaly or JVD., Carotids without bruits.  RESP: Clear to ausculation bilaterally without wheezes or crackles. Normal BS in all fields.  CV: RRR normal S1S2 without murmurs,  rubs or gallops. PMI normal  LYMPH: no cervical lymph adenopathy appreciated  MS: extremities-  no edema. Walks with limp, gait slightly ataxic, but much better with walker.   PSYCH: Alert and oriented times 3; speech- coherent  SKIN:  No obvious significant skin lesions on visible portions of face   ANKLE:  Mild swelling of right ankle, FROM withou pain. No pain at mortis, no pain at distal fibula       ASSESSMENT/PLAN:                                                      (M25.571) Acute right ankle pain  (primary encounter diagnosis)  Comment: ankle sprain, no evidence for fracture based on exam.   Rest and elevate the affected painful area.  Apply cold compresses intermittently as needed.  As pain recedes, begin normal activities slowly as tolerated.    Disucsed rehab exercises (proprioceptive, stretching, strengthening, etc) , discussed proper shoe choices, discussed activity with moderation and slow return to activty.   NSAIDs prn.  Discussed ankle brace optiosn inclduing AirCast, ACE, and vinyl booty.   Plan:     (E22.2) 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)  Comment: recheck last vist and sodium almost normal.   Plan:        See Patient Instructions    TONY SINGH M.D., MD  [facility]

## 2017-01-24 NOTE — NURSING NOTE
Chief Complaint   Patient presents with     Hospital F/U     fell with walker        Initial /72 mmHg  Pulse 73  Temp(Src) 97.4  F (36.3  C) (Oral)  SpO2 94% Estimated body mass index is 25.19 kg/(m^2) as calculated from the following:    Height as of 1/11/17: 5' (1.524 m).    Weight as of 1/11/17: 129 lb (58.514 kg).  BP completed using cuff size: regular

## 2017-04-07 DIAGNOSIS — Z53.9 DIAGNOSIS NOT YET DEFINED: Primary | ICD-10-CM

## 2017-04-07 PROCEDURE — G0180 MD CERTIFICATION HHA PATIENT: HCPCS | Performed by: INTERNAL MEDICINE

## 2017-05-09 ENCOUNTER — OFFICE VISIT (OUTPATIENT)
Dept: URGENT CARE | Facility: URGENT CARE | Age: 82
End: 2017-05-09
Payer: MEDICARE

## 2017-05-09 VITALS
BODY MASS INDEX: 22.3 KG/M2 | DIASTOLIC BLOOD PRESSURE: 60 MMHG | SYSTOLIC BLOOD PRESSURE: 118 MMHG | OXYGEN SATURATION: 96 % | HEART RATE: 63 BPM | WEIGHT: 114.2 LBS | TEMPERATURE: 97.7 F

## 2017-05-09 DIAGNOSIS — H61.23 BILATERAL IMPACTED CERUMEN: Primary | ICD-10-CM

## 2017-05-09 PROCEDURE — 99213 OFFICE O/P EST LOW 20 MIN: CPT | Performed by: PHYSICIAN ASSISTANT

## 2017-05-09 NOTE — NURSING NOTE
Chief Complaint   Patient presents with     Ear Problem     Bilateral ear pain and feels plugged       Initial /60 (BP Location: Right arm, Patient Position: Chair, Cuff Size: Adult Regular)  Pulse 63  Temp 97.7  F (36.5  C) (Oral)  Wt 114 lb 3.2 oz (51.8 kg)  SpO2 96%  BMI 22.3 kg/m2 Estimated body mass index is 22.3 kg/(m^2) as calculated from the following:    Height as of 1/11/17: 5' (1.524 m).    Weight as of this encounter: 114 lb 3.2 oz (51.8 kg).  Medication Reconciliation: complete

## 2017-05-09 NOTE — MR AVS SNAPSHOT
After Visit Summary   5/9/2017    Steffany Brown    MRN: [id number]           Patient Information     Date Of Birth          8/24/1930        Visit Information        Provider Department      5/9/2017 9:10 AM Kay Casanova PA-C [facility]        Today's Diagnoses     Bilateral impacted cerumen    -  1       Follow-ups after your visit        Your next 10 appointments already scheduled     May 24, 2017  7:45 AM CDT   LAB with [facility]   [facility]    [address] [place] [id number] [phone]           Patient must bring picture ID.  Patient should be prepared to give a urine specimen  Please do not eat 10-12 hours before your appointment if you are coming in fasting for labs on lipids, cholesterol, or glucose (sugar).  Pregnant women should follow their Care Team instructions. Water with medications is okay. Do not drink coffee or other fluids.   If you have concerns about taking  your medications, please ask at office or if scheduling via [employer], send a message by clicking on Secure Messaging, Message Your Care Team.            May 26, 2017  8:20 AM CDT   Office Visit with Leo Hancock MD   [facility]    [address] [place] [id number] [phone]           Bring a current list of meds and any records pertaining to this visit.  For Physicals, please bring immunization records and any forms needing to be filled out.  Please arrive 10 minutes early to complete paperwork.              Who to contact     If you have questions or need follow up information about today's clinic visit or your schedule please contact [facility] directly at [phone].  Normal or non-critical lab and imaging results will be communicated to you by MyChart, letter or phone within  4 business days after the clinic has received the results. If you do not hear from us within 7 days, please contact the clinic through imagine or phone. If you have a critical or abnormal lab result, we will notify you by phone as soon as possible.  Submit refill requests through imagine or call your pharmacy and they will forward the refill request to us. Please allow 3 business days for your refill to be completed.          Additional Information About Your Visit        imagine Information     imagine gives you secure access to your electronic health record. If you see a primary care provider, you can also send messages to your care team and make appointments. If you have questions, please call your primary care clinic.  If you do not have a primary care provider, please call [phone] and they will assist you.        Care EveryWhere ID     This is your Care EveryWhere ID. This could be used by other organizations to access your [facility] medical records  [id number]        Your Vitals Were     Pulse Temperature Pulse Oximetry BMI (Body Mass Index)          63 97.7  F (36.5  C) (Oral) 96% 22.3 kg/m2         Blood Pressure from Last 3 Encounters:   05/09/17 118/60   01/24/17 128/72   01/12/17 152/66    Weight from Last 3 Encounters:   05/09/17 114 lb 3.2 oz (51.8 kg)   01/11/17 129 lb (58.5 kg)   01/02/17 119 lb (54 kg)              Today, you had the following     No orders found for display       Primary Care Provider Office Phone # Fax #    Leo Hancock [phone]       [facility] [address] [place] [address]        Thank you!     Thank you for choosing [facility]  for your care. Our goal is always to provide you with excellent care. Hearing back from our patients is one way we can continue to improve our services. Please take a few minutes to complete the written survey that you may receive in the mail after your visit with us.  Thank you!             Your Updated Medication List - Protect others around you: Learn how to safely use, store and throw away your medicines at www.disposemymeds.org.          This list is accurate as of: 5/9/17 11:16 AM.  Always use your most recent med list.                   Brand Name Dispense Instructions for use    acetaminophen 500 MG tablet    TYLENOL     Take 500-1,000 mg by mouth every 6 hours as needed       atenolol 50 MG tablet    TENORMIN    90 tablet    Take 1 tablet (50 mg) by mouth daily       cholecalciferol 2000 UNITS Caps      Take 2 capsules by mouth 2 times daily       clopidogrel 75 MG tablet    PLAVIX    90 tablet    Take 1 tablet (75 mg) by mouth daily       fluticasone 50 MCG/ACT spray    FLONASE    16 g    Spray 2 sprays into both nostrils daily as needed for rhinitis       loratadine 10 MG tablet    CLARITIN     Take 10 mg by mouth daily       lovastatin 40 MG tablet    MEVACOR    90 tablet    Take 1 tablet (40 mg) by mouth At Bedtime       PRESERVISION AREDS Caps      Take 1 capsule by mouth 2 times daily

## 2017-05-09 NOTE — PROGRESS NOTES
SUBJECTIVE:  Steffany Brown is a 86 year old female who presents with bilateral ear blockage for 2 day(s).   Severity: moderate   Timing:gradual onset  Additional symptoms include none.      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     Other speech disturbance 3/05    chronic dysarthria since CVI     Pelvic mass in female 10/3/11    s/p LEODAN/BSO; pathology:  Serous cystadenofibroma, 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     Personal history of colonic polyps      Psoriasis      Rheumatoid arthritis(714.0)      Unspecified essential hypertension      Current Outpatient Prescriptions   Medication Sig Dispense Refill     clopidogrel (PLAVIX) 75 MG tablet Take 1 tablet (75 mg) by mouth daily 90 tablet 1     acetaminophen (TYLENOL) 500 MG tablet Take 500-1,000 mg by mouth every 6 hours as needed        cholecalciferol 2000 UNITS CAPS Take 2 capsules by mouth 2 times daily        Multiple Vitamins-Minerals (PRESERVISION AREDS) CAPS Take 1 capsule by mouth 2 times daily        lovastatin (MEVACOR) 40 MG tablet Take 1 tablet (40 mg) by mouth At Bedtime 90 tablet 3     atenolol (TENORMIN) 50 MG tablet Take 1 tablet (50 mg) by mouth daily 90 tablet 3     loratadine (CLARITIN) 10 MG tablet Take 10 mg by mouth daily       fluticasone (FLONASE) 50 MCG/ACT nasal spray Spray 2 sprays into both  nostrils daily as needed for rhinitis 16 g 5     Social History   Substance Use Topics     Smoking status: Never Smoker     Smokeless tobacco: Never Used      Comment: pt quit smoking in 1948     Alcohol use Yes      Comment: rare       ROS:   CONSTITUTIONAL:NEGATIVE for fever, chills, change in weight  INTEGUMENTARY/SKIN: NEGATIVE for worrisome rashes, moles or lesions  ENT/MOUTH: as per HPI    OBJECTIVE:  /60 (BP Location: Right arm, Patient Position: Chair, Cuff Size: Adult Regular)  Pulse 63  Temp 97.7  F (36.5  C) (Oral)  Wt 114 lb 3.2 oz (51.8 kg)  SpO2 96%  BMI 22.3 kg/m2   EXAM:  Prior to ear lavage: both ear canals are obstructed.  After ear lavage by nursing, the exam is:  The right TM is normal: no effusions, no erythema, and normal landmarks     The right auditory canal is normal and without drainage, edema or erythema  The left TM is normal: no effusions, no erythema, and normal landmarks  The left auditory canal is normal and without drainage, edema or erythema  SKIN: no rashes or lesions    (H61.23) Bilateral impacted cerumen  (primary encounter diagnosis)  Comment:   Plan: resolved with ear lavage

## 2017-05-24 DIAGNOSIS — I10 BENIGN ESSENTIAL HYPERTENSION: ICD-10-CM

## 2017-05-24 DIAGNOSIS — E22.2 SIADH (SYNDROME OF INAPPROPRIATE ADH PRODUCTION) (H): ICD-10-CM

## 2017-05-24 DIAGNOSIS — E78.5 HYPERLIPIDEMIA LDL GOAL <100: ICD-10-CM

## 2017-05-24 LAB
ALBUMIN SERPL-MCNC: 3.3 G/DL (ref 3.4–5)
ALP SERPL-CCNC: 108 U/L (ref 40–150)
ALT SERPL W P-5'-P-CCNC: 13 U/L (ref 0–50)
ANION GAP SERPL CALCULATED.3IONS-SCNC: 13 MMOL/L (ref 3–14)
AST SERPL W P-5'-P-CCNC: 20 U/L (ref 0–45)
BILIRUB SERPL-MCNC: 0.5 MG/DL (ref 0.2–1.3)
BUN SERPL-MCNC: 6 MG/DL (ref 7–30)
CALCIUM SERPL-MCNC: 9.8 MG/DL (ref 8.5–10.1)
CHLORIDE SERPL-SCNC: 95 MMOL/L (ref 94–109)
CHOLEST SERPL-MCNC: 150 MG/DL
CO2 SERPL-SCNC: 23 MMOL/L (ref 20–32)
CREAT SERPL-MCNC: 0.59 MG/DL (ref 0.52–1.04)
ERYTHROCYTE [DISTWIDTH] IN BLOOD BY AUTOMATED COUNT: 12.8 % (ref 10–15)
GFR SERPL CREATININE-BSD FRML MDRD: ABNORMAL ML/MIN/1.7M2
GLUCOSE SERPL-MCNC: 90 MG/DL (ref 70–99)
HCT VFR BLD AUTO: 42.3 % (ref 35–47)
HDLC SERPL-MCNC: 56 MG/DL
HGB BLD-MCNC: 13.8 G/DL (ref 11.7–15.7)
LDLC SERPL CALC-MCNC: 79 MG/DL
MCH RBC QN AUTO: 30.1 PG (ref 26.5–33)
MCHC RBC AUTO-ENTMCNC: 32.6 G/DL (ref 31.5–36.5)
MCV RBC AUTO: 92 FL (ref 78–100)
NONHDLC SERPL-MCNC: 94 MG/DL
PLATELET # BLD AUTO: 377 10E9/L (ref 150–450)
POTASSIUM SERPL-SCNC: 4.2 MMOL/L (ref 3.4–5.3)
PROT SERPL-MCNC: 7.8 G/DL (ref 6.8–8.8)
RBC # BLD AUTO: 4.58 10E12/L (ref 3.8–5.2)
SODIUM SERPL-SCNC: 131 MMOL/L (ref 133–144)
TRIGL SERPL-MCNC: 76 MG/DL
WBC # BLD AUTO: 11.4 10E9/L (ref 4–11)

## 2017-05-24 PROCEDURE — 36415 COLL VENOUS BLD VENIPUNCTURE: CPT | Performed by: INTERNAL MEDICINE

## 2017-05-24 PROCEDURE — 80061 LIPID PANEL: CPT | Performed by: INTERNAL MEDICINE

## 2017-05-24 PROCEDURE — 85027 COMPLETE CBC AUTOMATED: CPT | Performed by: INTERNAL MEDICINE

## 2017-05-24 PROCEDURE — 80053 COMPREHEN METABOLIC PANEL: CPT | Performed by: INTERNAL MEDICINE

## 2017-05-26 ENCOUNTER — OFFICE VISIT (OUTPATIENT)
Dept: INTERNAL MEDICINE | Facility: CLINIC | Age: 82
End: 2017-05-26
Payer: MEDICARE

## 2017-05-26 VITALS
TEMPERATURE: 98.3 F | DIASTOLIC BLOOD PRESSURE: 76 MMHG | OXYGEN SATURATION: 94 % | BODY MASS INDEX: 23.06 KG/M2 | SYSTOLIC BLOOD PRESSURE: 134 MMHG | HEART RATE: 70 BPM | WEIGHT: 118.1 LBS

## 2017-05-26 DIAGNOSIS — I69.959 HEMIPLEGIA OF DOMINANT SIDE AS LATE EFFECT FOLLOWING CEREBROVASCULAR DISEASE (H): Primary | ICD-10-CM

## 2017-05-26 DIAGNOSIS — M06.9 RHEUMATOID ARTHRITIS INVOLVING MULTIPLE SITES, UNSPECIFIED RHEUMATOID FACTOR PRESENCE: ICD-10-CM

## 2017-05-26 DIAGNOSIS — I10 BENIGN ESSENTIAL HYPERTENSION: ICD-10-CM

## 2017-05-26 DIAGNOSIS — H35.3130 AGE-RELATED MACULAR DEGENERATION, DRY, BOTH EYES: ICD-10-CM

## 2017-05-26 DIAGNOSIS — J30.2 OTHER SEASONAL ALLERGIC RHINITIS: ICD-10-CM

## 2017-05-26 DIAGNOSIS — E22.2 SIADH (SYNDROME OF INAPPROPRIATE ADH PRODUCTION) (H): ICD-10-CM

## 2017-05-26 DIAGNOSIS — E78.5 HYPERLIPIDEMIA LDL GOAL <100: ICD-10-CM

## 2017-05-26 DIAGNOSIS — I69.920 APHASIA, LATE EFFECT OF CEREBROVASCULAR DISEASE: ICD-10-CM

## 2017-05-26 PROCEDURE — 99214 OFFICE O/P EST MOD 30 MIN: CPT | Performed by: INTERNAL MEDICINE

## 2017-05-26 RX ORDER — ATENOLOL 50 MG/1
50 TABLET ORAL DAILY
Qty: 90 TABLET | Refills: 3 | Status: SHIPPED | OUTPATIENT
Start: 2017-05-26 | End: 2018-05-24

## 2017-05-26 RX ORDER — LOVASTATIN 40 MG
40 TABLET ORAL AT BEDTIME
Qty: 90 TABLET | Refills: 3 | Status: SHIPPED | OUTPATIENT
Start: 2017-05-26 | End: 2018-05-24

## 2017-05-26 RX ORDER — FLUTICASONE PROPIONATE 50 MCG
2 SPRAY, SUSPENSION (ML) NASAL DAILY PRN
Qty: 16 G | Refills: 5 | Status: SHIPPED | OUTPATIENT
Start: 2017-05-26 | End: 2018-11-13

## 2017-05-26 RX ORDER — CLOPIDOGREL BISULFATE 75 MG/1
75 TABLET ORAL DAILY
Qty: 90 TABLET | Refills: 3 | Status: SHIPPED | OUTPATIENT
Start: 2017-05-26 | End: 2018-05-24

## 2017-05-26 NOTE — PROGRESS NOTES
SUBJECTIVE:                                                    Steffany Brown is a 86 year old female who presents to clinic today for the following health issues:      Hyperlipidemia Follow-Up      Rate your low fat/cholesterol diet?: good    Taking statin?  Yes, no muscle aches from statin    Other lipid medications/supplements?:  None    Has history of hyperlipidemia.  On statin for this, denies any significant side effects of this medication.      Latest labs reviewed:    Recent Labs   Lab Test  05/24/17   0744  05/24/16   0738  05/12/15   0742  05/19/14   0741   CHOL  150  155  153  161   HDL  56  60  60  65   LDL  79  78  71  81   TRIG  76  86  111  77   CHOLHDLRATIO   --    --   2.6  2.5        Lab Results   Component Value Date    AST 20 05/24/2017          Hypertension Follow-up      Outpatient blood pressures are not being checked.    Low Salt Diet: no added salt    Blood presure remains well controlled at home  Readings outside clinic are within normal limits.  Reviewed last 6 BP readings in chart:  BP Readings from Last 6 Encounters:   05/26/17 134/76   05/09/17 118/60   01/24/17 128/72   01/12/17 152/66   01/02/17 128/78   11/29/16 134/62     He has not experienced any significant side effects from medicaiotns for hypertension.    NO active cardiac complaints or symptoms with exercise.        Amount of exercise or physical activity: None    Problems taking medications regularly: No    Medication side effects: none    Diet: low salt and low fat/cholesterol          Problem list and histories reviewed & adjusted, as indicated.  Additional history: as documented        Reviewed and updated as needed this visit by clinical staff  Tobacco  Allergies       Reviewed and updated as needed this visit by Provider           Past Medical History:  ---------------------------  Past Medical History:   Diagnosis Date     Age-related macular degeneration, dry, both eyes 10/24/14    per [facility]      Diverticulosis of colon (without mention of hemorrhage)     Several diverticuli seen on colonoscopy     Eczema      Family history of colon cancer     mother  from colon cancer age 89     Hip fracture, intertrochanteric (H) 9/10/09    left hip fracture, S/P ORIF     History of small bowel obstruction ,      Osteoporosis, unspecified      Other and unspecified hyperlipidemia      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     Other speech disturbance 3/05    chronic dysarthria since CVI     Pelvic mass in female 10/3/11    s/p LEODAN/BSO; pathology:  Serous cystadenofibroma, 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     Personal history of colonic polyps      Psoriasis      Rheumatoid arthritis(714.0)      Unspecified essential hypertension        Past Surgical History:  ---------------------------  Past Surgical History:   Procedure Laterality Date     C APPENDECTOMY       COLONOSCOPY  03    Diverticulosis, single small polyp removed (at [facility])     COLONOSCOPY  08    diverticulosis, few scattered mild angioectasias, no polyps     COLONOSCOPY  2013    Procedure: COLONOSCOPY;  COLONOSCOPY ;  Surgeon: Romario Watters MD;  Location:  GI     D & C       FRACTURE TX, HIP RT/LT  9/10/09    Open reduction with IM nailing of left hip fracture using a gamma nail     HC COLONOSCOPY THRU STOMA W BIOPSY/CAUTERY TUMOR/POLYP/LESION  3/00    colonoscopy adenamatous polyp ([facility])     HYSTERECTOMY, LEODAN  10/03/11    s/p LEODAN/BSO to remove pelvic mass; pathology:  Serous cystadenofibroma, 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     OPEN REDUCTION INTERNAL FIXATION ANKLE  2014    Procedure: OPEN REDUCTION INTERNAL FIXATION ANKLE;  Surgeon: Emmanuel Gomez MD;  Location:  OR     OPEN REDUCTION INTERNAL FIXATION HIP NAILING Right 2016    Procedure: OPEN REDUCTION INTERNAL FIXATION HIP NAILING;  Surgeon:  Jeronimo Giordano MD;  Location: [facility] OR     RECTAL SURGERY  1971    anterior laproscopic repair of sigmoid prolapse     SMALL INTESTINE SURGERY       STRESS ECHO ([facility]RO)  4/98    stress echo (negative)     SURGICAL HISTORY OF -   3/12/05    attempted angioplasty/stent of right middle cerebral artery ( M1 segment), no effective       Current Medications:  ---------------------------  Current Outpatient Prescriptions   Medication Sig Dispense Refill     clopidogrel (PLAVIX) 75 MG tablet Take 1 tablet (75 mg) by mouth daily 90 tablet 1     acetaminophen (TYLENOL) 500 MG tablet Take 500-1,000 mg by mouth every 6 hours as needed        cholecalciferol 2000 UNITS CAPS Take 2 capsules by mouth 2 times daily        Multiple Vitamins-Minerals (PRESERVISION AREDS) CAPS Take 1 capsule by mouth 2 times daily        lovastatin (MEVACOR) 40 MG tablet Take 1 tablet (40 mg) by mouth At Bedtime 90 tablet 3     atenolol (TENORMIN) 50 MG tablet Take 1 tablet (50 mg) by mouth daily 90 tablet 3     fluticasone (FLONASE) 50 MCG/ACT nasal spray Spray 2 sprays into both nostrils daily as needed for rhinitis 16 g 5     loratadine (CLARITIN) 10 MG tablet Take 10 mg by mouth daily         Allergies:  -------------  Allergies   Allergen Reactions     Atorvastatin Calcium Cramps     Leg cramps     Lisinopril Swelling and Rash       Social History:  -------------------  Social History     Social History     Marital status: Single     Spouse name: N/A     Number of children: 0     Years of education: N/A     Occupational History     [employer]     [employer]     Social History Main Topics     Smoking status: Never Smoker     Smokeless tobacco: Never Used      Comment: pt quit smoking in 1948     Alcohol use Yes      Comment: rare     Drug use: No     Sexual activity: No     Other Topics Concern     Not on file     Social History Narrative    Living arrangements - the patient lives with her friend, [facility]  History:  ------------------------------  Family History   Problem Relation Age of Onset     CANCER Mother       of colon ca age 89     Cancer - colorectal Mother      Cardiovascular Father       at age 65     CANCER Brother       of lung ca age 65         ROS:  REVIEW OF SYSTEMS:    RESP: negative for cough, dyspnea, wheezing, hemoptysis  CV: negative for chest pain, palpitations, PND, FENG, orthopnea  GI: negative for dysphagia, N/V, pain, melena, diarrhea and constipation  NEURO: negative for numbness/tingling; POS for chronic right hemiplegia, incoordination, and focal weakness     OBJECTIVE:                                                    /76  Pulse 70  Temp 98.3  F (36.8  C) (Oral)  Wt 118 lb 1.6 oz (53.6 kg)  SpO2 94%  BMI 23.06 kg/m2     GENERAL alert and no distress  EYES:  Normal sclera,conjunctiva, EOMI  HENT: oral and posterior pharynx without lesions or erythema, facies asymmetric  NECK: Neck supple. No LAD, without thyroidmegaly or JVD., Carotids without bruits.  RESP: Clear to ausculation bilaterally without wheezes or crackles. Normal BS in all fields.  CV: RRR normal S1S2 without murmurs, rubs or gallops. PMI normal  LYMPH: no cervical lymph adenopathy appreciated  MS: extremities- no gross deformities of the visible extremities noted, no edema  PSYCH: Alert and oriented times 3; speech- chronically dysarhric,   SKIN:  No obvious significant skin lesions on visible portions of face   NEURO:  Chronic hemiplegia right side, dysarthria, mild word finding. Gait is shuffling, but better with walker.        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  Continue current therapy.   Discussed secondary risk factor modification and recommended continuing  aggressive management of these items.   Plan: clopidogrel (PLAVIX) 75 MG tablet            (E22.2) 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)  Comment: This condition is currently controlled on the current medical regimen.  Continue current therapy.   Plan: CBC with platelets and differential, Basic         metabolic panel            (M06.9) Rheumatoid arthritis involving multiple sites, unspecified rheumatoid factor presence (H)  Comment: This condition is currently controlled on the current medical regimen.  Continue current therapy.   Plan:     (E78.5) Hyperlipidemia LDL goal <100  Comment: Discussed new guidelines recommending a statin cholesterol lowering medication for any patient with either diabetes and/or vascular disease, aiming for a LDL goal under 100 for sure, ideally under 70.    Reviewed statins and their side effects including muscle pain, muscle inflammation, GI upset.  Told the patient to stop the medication in question and to call if any side effects develop.   Recommended CoQ10 200-300 mg at the same time as taking the statin medication to help reduce the chance of muscle side effects from the statin.    Plan: lovastatin (MEVACOR) 40 MG tablet            (I10) Benign essential hypertension  Comment: This condition is currently controlled on the current medical regimen.  Continue current therapy.   Discussed hypertension in detail including JNC VIII guidelines for blood pressure goals.  Discussed indication for treatment and treatment options.  Discussed the importance for aggressive management of HTN to prevent vascular complications later.  Recommended lower fat, lower carbohydrate, and lower sodium (<2000 mg)diet.  Discussed required intervals for follow up on HTN, lab studies, and the need to aggresive management of other cardiac disease risk factors.  Recommened pt. follow their blood pressures outside the clinic to ensure that BPs are remaining within guidelines, and to contact me  if the readings are not within guidelines so we can adjust treatment as needed.   Plan: atenolol (TENORMIN) 50 MG tablet, CBC with         platelets and differential, Basic metabolic         panel            (J30.2) Other seasonal allergic rhinitis  Comment:   Plan: fluticasone (FLONASE) 50 MCG/ACT spray            (H35.3130) Age-related macular degeneration, dry, both eyes  Comment:   Plan:     (I69.920) Aphasia, late effect of cerebrovascular disease  Comment: This condition is currently controlled on the current medical regimen.  Continue current therapy.   Plan:        See Patient Instructions    TONY SINGH M.D., MD  [facility]

## 2017-05-26 NOTE — PATIENT INSTRUCTIONS
"*  Continue all medications at the same doses.  Contact your usual pharmacy if you need refills.    *  Return to see me in 6 months, sooner if needed.  Call [phone] to schedule this appointment.         5 GOALS TO PREVENT VASCULAR DISEASE:     1.  Aggressive blood pressure control, under 130/80 ideally.  Using medications if needed.    Your blood pressure is under good control    BP Readings from Last 4 Encounters:   05/26/17 134/76   05/09/17 118/60   01/24/17 128/72   01/12/17 152/66       2.  Aggressive LDL cholesterol (\"bad cholesterol\") lowering as indicated.    Your goal is an LDL under 130 for sure, preferably under 100.  (If you have diabetes or previous vascular disease, the the LDL goals would be under 100 for sure, preferably under 70.)    New guidelines identify four high-risk groups who could benefit from statins:   *people with pre-existing heart disease, such as those who have had a heart attack;   *people ages 40 to 75 who have diabetes of any type  *patients ages 40 to 75 with at least a 7.5% risk of developing cardiovascular disease over the next decade, according to a formula described in the guidelines  *patients with the sort of super-high cholesterol that sometimes runs in families, as evidenced by an LDL of 190 milligrams per deciliter or higher    Your cholesterol levels are well controlled.    Recent Labs   Lab Test  05/24/17   0744  05/24/16   0738  05/12/15   0742  05/19/14   0741   CHOL  150  155  153  161   HDL  56  60  60  65   LDL  79  78  71  81   TRIG  76  86  111  77   CHOLHDLRATIO   --    --   2.6  2.5       3.  Aggressive diabetic prevention, screening and/or management.      You do not have diabetes as of the most recent blood tests.     4.  No smoking    5.  Consider taking low dose aspirin (81 mg) tablet once per day over the age of 50, every day unless there is a specific reason that you cannot take aspirin (such as side effect, allergy, or you are on another \"blood " "thinner\").        --Based on your current cardiac risk factors, you should continue to take the Plavix once per day.        "

## 2017-05-26 NOTE — MR AVS SNAPSHOT
"              After Visit Summary   5/26/2017    Steffany Brown    MRN: [id number]           Patient Information     Date Of Birth          8/24/1930        Visit Information        Provider Department      5/26/2017 8:20 AM Leo Hancock MD 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  Contact your usual pharmacy if you need refills.    *  Return to see me in 6 months, sooner if needed.  Call [phone] to schedule this appointment.         5 GOALS TO PREVENT VASCULAR DISEASE:     1.  Aggressive blood pressure control, under 130/80 ideally.  Using medications if needed.    Your blood pressure is under good control    BP Readings from Last 4 Encounters:   05/26/17 134/76   05/09/17 118/60   01/24/17 128/72   01/12/17 152/66       2.  Aggressive LDL cholesterol (\"bad cholesterol\") lowering as indicated.    Your goal is an LDL under 130 for sure, preferably under 100.  (If you have diabetes or previous vascular disease, the the LDL goals would be under 100 for sure, preferably under 70.)    New guidelines identify four high-risk groups who could benefit from statins:   *people with pre-existing heart disease, such as those who have had a heart attack;   *people ages 40 to 75 who have diabetes of any type  *patients ages 40 to 75 with at least a 7.5% risk of developing cardiovascular disease over the next decade, according to a formula described in the " "guidelines  *patients with the sort of super-high cholesterol that sometimes runs in families, as evidenced by an LDL of 190 milligrams per deciliter or higher    Your cholesterol levels are well controlled.    Recent Labs   Lab Test  05/24/17   0744  05/24/16   0738  05/12/15   0742  05/19/14   0741   CHOL  150  155  153  161   HDL  56  60  60  65   LDL  79  78  71  81   TRIG  76  86  111  77   CHOLHDLRATIO   --    --   2.6  2.5       3.  Aggressive diabetic prevention, screening and/or management.      You do not have diabetes as of the most recent blood tests.     4.  No smoking    5.  Consider taking low dose aspirin (81 mg) tablet once per day over the age of 50, every day unless there is a specific reason that you cannot take aspirin (such as side effect, allergy, or you are on another \"blood thinner\").        --Based on your current cardiac risk factors, you should continue to take the Plavix once per day.                Follow-ups after your visit        Future tests that were ordered for you today     Open Future Orders        Priority Expected Expires Ordered    CBC with platelets and differential Routine 11/26/2017 1/26/2018 5/26/2017    Basic metabolic panel Routine 11/26/2017 1/26/2018 5/26/2017            Who to contact     If you have questions or need follow up information about today's clinic visit or your schedule please contact [facility] directly at [phone].  Normal or non-critical lab and imaging results will be communicated to you by MyChart, letter or phone within 4 business days after the clinic has received the results. If you do not hear from us within 7 days, please contact the clinic through MyChart or phone. If you have a critical or abnormal lab result, we will notify you by phone as soon as possible.  Submit refill requests through [employer] or call your pharmacy and they will forward the refill request to us. Please allow 3 business days for your refill to " be completed.          Additional Information About Your Visit        L2hart Information     Slide gives you secure access to your electronic health record. If you see a primary care provider, you can also send messages to your care team and make appointments. If you have questions, please call your primary care clinic.  If you do not have a primary care provider, please call [phone] and they will assist you.        Care EveryWhere ID     This is your Care EveryWhere ID. This could be used by other organizations to access your [place] medical records  [id number]        Your Vitals Were     Pulse Temperature Pulse Oximetry BMI (Body Mass Index)          70 98.3  F (36.8  C) (Oral) 94% 23.06 kg/m2         Blood Pressure from Last 3 Encounters:   05/26/17 134/76   05/09/17 118/60   01/24/17 128/72    Weight from Last 3 Encounters:   05/26/17 118 lb 1.6 oz (53.6 kg)   05/09/17 114 lb 3.2 oz (51.8 kg)   01/11/17 129 lb (58.5 kg)                 Where to get your medicines      These medications were sent to [facility] [address] [address]     Phone:  [phone]     atenolol 50 MG tablet    clopidogrel 75 MG tablet    fluticasone 50 MCG/ACT spray    lovastatin 40 MG tablet          Primary Care Provider Office Phone # Fax #    Leo Hancock [phone]       [address] [address]        Thank you!     Thank you for choosing [facility]  for your care. Our goal is always to provide you with excellent care. Hearing back from our patients is one way we can continue to improve our services. Please take a few minutes to complete the written survey that you may receive in the mail after your visit with us. Thank you!             Your Updated Medication List - Protect others around you: Learn how to safely use, store and throw away your medicines at  www.disposemymeds.org.          This list is accurate as of: 5/26/17  9:05 AM.  Always use your most recent med list.                   Brand Name Dispense Instructions for use    acetaminophen 500 MG tablet    TYLENOL     Take 500-1,000 mg by mouth every 6 hours as needed       atenolol 50 MG tablet    TENORMIN    90 tablet    Take 1 tablet (50 mg) by mouth daily       cholecalciferol 2000 UNITS Caps      Take 2 capsules by mouth 2 times daily       clopidogrel 75 MG tablet    PLAVIX    90 tablet    Take 1 tablet (75 mg) by mouth daily       fluticasone 50 MCG/ACT spray    FLONASE    16 g    Spray 2 sprays into both nostrils daily as needed for rhinitis       loratadine 10 MG tablet    CLARITIN     Take 10 mg by mouth daily       lovastatin 40 MG tablet    MEVACOR    90 tablet    Take 1 tablet (40 mg) by mouth At Bedtime       PRESERVISION AREDS Caps      Take 1 capsule by mouth 2 times daily

## 2017-05-26 NOTE — NURSING NOTE
Chief Complaint   Patient presents with     Diabetes     review labs     Hypertension     review labs       Initial /76  Pulse 70  Temp 98.3  F (36.8  C) (Oral)  Wt 118 lb 1.6 oz (53.6 kg)  SpO2 94%  BMI 23.06 kg/m2 Estimated body mass index is 23.06 kg/(m^2) as calculated from the following:    Height as of 1/11/17: 5' (1.524 m).    Weight as of this encounter: 118 lb 1.6 oz (53.6 kg).  Medication Reconciliation: complete   Dena Donahue, CMA

## 2017-07-16 DIAGNOSIS — Z79.2 PROPHYLACTIC ANTIBIOTIC: Primary | ICD-10-CM

## 2017-07-18 RX ORDER — AMOXICILLIN 500 MG/1
2000 CAPSULE ORAL ONCE
Qty: 4 CAPSULE | Refills: 2 | Status: SHIPPED | OUTPATIENT
Start: 2017-07-18 | End: 2017-09-17

## 2017-07-18 NOTE — TELEPHONE ENCOUNTER
The latest guidelines no longer require antibiotics before dental procedures in any other circumstance except heart valve replacement (which she has not had).    It is no longer required for people with prior joint replacement or hardware.   Providers are still getting around to being comfortable with these recommendations so you may still find people who still do it.      If she still wants to take it, then fine, but it is not required any longer.     Prescription(s) sent electronically to specified pharmacy.     Do NOT take AUgmentin (amoxicillin/clavulanate) because this will cause diarrhea.    Take Amoxicillin 2000 mg ( 4 x 500 mg) one hour prior to dentla procedure.

## 2017-07-18 NOTE — TELEPHONE ENCOUNTER
Spoke with Kamini, patient's friend/caregiver, regarding why patient is requesting refill on Augmentin. Says it should not be augmentin, should be amoxicillin. Requesting antibiotic before dental work. Patient has metal in body- broken hips, broken ankle. Has always taken amoxicillin before dental appointments. Is having dental work done tomorrow, July 31st, and the 23rd of August. Says has 4 amoxicillin for patient to take tomorrow already but they might be Kamini's and not the patients. Advised that patient should not take Kamini's medication. Looks like in past patient has been ordered to take four 500 mg amoxicillin capsules one hour prior to dental appointments.

## 2017-08-07 ENCOUNTER — OFFICE VISIT (OUTPATIENT)
Dept: URGENT CARE | Facility: URGENT CARE | Age: 82
End: 2017-08-07
Payer: MEDICARE

## 2017-08-07 VITALS
WEIGHT: 118 LBS | BODY MASS INDEX: 23.05 KG/M2 | DIASTOLIC BLOOD PRESSURE: 74 MMHG | TEMPERATURE: 97.9 F | SYSTOLIC BLOOD PRESSURE: 126 MMHG

## 2017-08-07 DIAGNOSIS — H61.23 BILATERAL IMPACTED CERUMEN: Primary | ICD-10-CM

## 2017-08-07 PROCEDURE — 99213 OFFICE O/P EST LOW 20 MIN: CPT | Performed by: PHYSICIAN ASSISTANT

## 2017-08-07 NOTE — MR AVS SNAPSHOT
After Visit Summary   8/7/2017    Steffany Brown    MRN: [id number]           Patient Information     Date Of Birth          8/24/1930        Visit Information        Provider Department      8/7/2017 9:15 AM James Boucher PA-C [facility]        Today's Diagnoses     Bilateral impacted cerumen    -  1       Follow-ups after your visit        Who to contact     If you have questions or need follow up information about today's clinic visit or your schedule please contact [facility] directly at [phone].  Normal or non-critical lab and imaging results will be communicated to you by Cortriumhart, letter or phone within 4 business days after the clinic has received the results. If you do not hear from us within 7 days, please contact the clinic through iSitest or phone. If you have a critical or abnormal lab result, we will notify you by phone as soon as possible.  Submit refill requests through Life360 or call your pharmacy and they will forward the refill request to us. Please allow 3 business days for your refill to be completed.          Additional Information About Your Visit        MyChart Information     Life360 gives you secure access to your electronic health record. If you see a primary care provider, you can also send messages to your care team and make appointments. If you have questions, please call your primary care clinic.  If you do not have a primary care provider, please call [phone] and they will assist you.        Care EveryWhere ID     This is your Care EveryWhere ID. This could be used by other organizations to access your [place] medical records  [id number]        Your Vitals Were     Temperature Breastfeeding? BMI (Body Mass Index)             97.9  F (36.6  C) (Oral) No 23.05 kg/m2          Blood Pressure from Last 3 Encounters:   08/07/17 126/74   05/26/17 134/76   05/09/17 118/60    Weight from Last 3  Encounters:   08/07/17 118 lb (53.5 kg)   05/26/17 118 lb 1.6 oz (53.6 kg)   05/09/17 114 lb 3.2 oz (51.8 kg)              We Performed the Following     Nursing Communication 1          Today's Medication Changes          These changes are accurate as of: 8/7/17 10:20 AM.  If you have any questions, ask your nurse or doctor.               Start taking these medicines.        Dose/Directions    carbamide peroxide 6.5 % otic solution   Commonly known as:  DEBROX   Used for:  Bilateral impacted cerumen   Started by:  James Boucher PA-C        Dose:  5-10 drop   Place 5-10 drops into both ears 2 times daily   Quantity:  30 mL   Refills:  0            Where to get your medicines      Some of these will need a paper prescription and others can be bought over the counter.  Ask your nurse if you have questions.     Bring a paper prescription for each of these medications     carbamide peroxide 6.5 % otic solution                Primary Care Provider Office Phone # Fax #    Leo Hancock [phone]       [facility] [address] [address]        Equal Access to Services     CIPRIANO BARRERA AH: Hadii roberto ku hadasho Soomaali, waaxda luqadaha, qaybta kaalmada adeegyada, oanh plascencia . So [facility] [phone].    ATENCIÓN: Si habla español, tiene a garcia disposición servicios gratuitos de asistencia lingüística. Llame al [phone].    We comply with applicable federal civil rights laws and [place] laws. We do not discriminate on the basis of race, color, national origin, age, disability sex, sexual orientation or gender identity.            Thank you!     Thank you for choosing [facility]  for your care. Our goal is always to provide you with excellent care. Hearing back from our patients is one way we can continue to improve our services. Please take a few minutes to complete the written survey that you may receive in the  mail after your visit with us. Thank you!             Your Updated Medication List - Protect others around you: Learn how to safely use, store and throw away your medicines at www.disposemymeds.org.          This list is accurate as of: 8/7/17 10:20 AM.  Always use your most recent med list.                   Brand Name Dispense Instructions for use Diagnosis    acetaminophen 500 MG tablet    TYLENOL     Take 500-1,000 mg by mouth every 6 hours as needed        atenolol 50 MG tablet    TENORMIN    90 tablet    Take 1 tablet (50 mg) by mouth daily    Benign essential hypertension       carbamide peroxide 6.5 % otic solution    DEBROX    30 mL    Place 5-10 drops into both ears 2 times daily    Bilateral impacted cerumen       cholecalciferol 2000 UNITS Caps      Take 2 capsules by mouth 2 times daily        clopidogrel 75 MG tablet    PLAVIX    90 tablet    Take 1 tablet (75 mg) by mouth daily    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

## 2017-08-07 NOTE — PROGRESS NOTES
SUBJECTIVE:  Steffany Brown is a 86 year old female who presents with bilateral ear blockage and wax for 1 week(s).   Severity: moderate   Timing:still present  Additional symptoms include ear discomfort.      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     Other speech disturbance 3/05    chronic dysarthria since CVI     Pelvic mass in female 10/3/11    s/p LEODAN/BSO; pathology:  Serous cystadenofibroma, 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     Personal history of colonic polyps      Psoriasis      Rheumatoid arthritis(714.0)      Unspecified essential hypertension      Patient Active Problem List   Diagnosis     Rheumatoid arthritis (H)     Osteoporosis     Benign essential hypertension     History of colonic polyps     Family history of malignant neoplasm of gastrointestinal tract     Other generalized ischemic cerebrovascular disease     Other speech disturbance     Hyposmolality and/or hyponatremia     Pain in joint, shoulder region     Aftercare for healing traumatic fracture of upper arm     Aftercare for healing traumatic fracture of upper leg     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     Psoriasis     Hyperlipidemia LDL goal <100     Advance Care Planning     Allergic rhinitis     Eczema     Family history of colon cancer     Trimalleolar fracture of ankle, closed     Aphasia, late effect  of cerebrovascular disease     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)     Closed right ankle fracture     Physical deconditioning     Health Care Home     Age-related macular degeneration, dry, both eyes     Closed right hip fracture (H)     Anemia due to blood loss, acute     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)     Urinary retention     Right ankle pain     Social History   Substance Use Topics     Smoking status: Never Smoker     Smokeless tobacco: Never Used      Comment: pt quit smoking in 1948     Alcohol use Yes      Comment: rare       ROS:   CONSTITUTIONAL:NEGATIVE for fever, chills, change in weight  INTEGUMENTARY/SKIN: NEGATIVE for worrisome rashes, moles or lesions  ENT/MOUTH: POSITIVE for bilateral ear cerumen impaction  RESP:NEGATIVE for significant cough or SOB  CV: NEGATIVE for chest pain, palpitations or peripheral edema  NEURO: Positive for decreased hearing    OBJECTIVE:  /74  Temp 97.9  F (36.6  C) (Oral)  Wt 118 lb (53.5 kg)  Breastfeeding? No  BMI 23.05 kg/m2   EXAM:  The right TM is not visualized secondary to cerumen     The right auditory canal is obstructed with cerumen  The left TM is not visualized secondary to cerumen  The left auditory canal is obstructed with cerumen  Oropharynx exam is normal: no lesions, erythema, adenopathy or exudate.  EYES: EOMI,  PERRL, conjunctiva clear  NECK: supple, non-tender to palpation, no adenopathy noted  SKIN: no suspicious lesions or rashes   NEURO: Normal strength and tone, sensory exam grossly normal    ASSESSMENT/PLAN;      ICD-10-CM    1. Bilateral impacted cerumen H61.23 carbamide peroxide (DEBROX) 6.5 % otic solution           PLAN:  Orders Placed This Encounter     carbamide peroxide (DEBROX) 6.5 % otic solution     Return for ear irrigation  Or follow up with ENT  See orders in [facility]

## 2017-08-07 NOTE — NURSING NOTE
Chief Complaint   Patient presents with     Ear Problem     present for ear cleaning.       Initial /74  Temp 97.9  F (36.6  C) (Oral)  Wt 118 lb (53.5 kg)  Breastfeeding? No  BMI 23.05 kg/m2 Estimated body mass index is 23.05 kg/(m^2) as calculated from the following:    Height as of 1/11/17: 5' (1.524 m).    Weight as of this encounter: 118 lb (53.5 kg).  Medication Reconciliation: complete

## 2017-08-08 ENCOUNTER — TELEPHONE (OUTPATIENT)
Dept: INTERNAL MEDICINE | Facility: CLINIC | Age: 82
End: 2017-08-08

## 2017-08-08 NOTE — TELEPHONE ENCOUNTER
Patient's Care giver called ([facility] on file) and reported that patient is having severe skin break down to her bottom and that no over the counter medications are working. Writer educated on PU and advised for patient to be seen in clinic for MD to be able to fully assess and diagnose. Care giver agreed. Appointment scheduled     KEYONA Freedman

## 2017-08-09 ENCOUNTER — OFFICE VISIT (OUTPATIENT)
Dept: INTERNAL MEDICINE | Facility: CLINIC | Age: 82
End: 2017-08-09
Payer: MEDICARE

## 2017-08-09 VITALS
WEIGHT: 118.2 LBS | SYSTOLIC BLOOD PRESSURE: 148 MMHG | OXYGEN SATURATION: 97 % | HEART RATE: 70 BPM | BODY MASS INDEX: 23.08 KG/M2 | DIASTOLIC BLOOD PRESSURE: 72 MMHG | TEMPERATURE: 98 F

## 2017-08-09 DIAGNOSIS — L03.317 CELLULITIS OF MULTIPLE SITES OF BUTTOCK: ICD-10-CM

## 2017-08-09 DIAGNOSIS — B37.2 YEAST INFECTION OF THE SKIN: Primary | ICD-10-CM

## 2017-08-09 PROCEDURE — 99214 OFFICE O/P EST MOD 30 MIN: CPT | Performed by: INTERNAL MEDICINE

## 2017-08-09 RX ORDER — CEPHALEXIN 500 MG/1
500 CAPSULE ORAL 3 TIMES DAILY
Qty: 30 CAPSULE | Refills: 0 | Status: SHIPPED | OUTPATIENT
Start: 2017-08-09 | End: 2017-08-19

## 2017-08-09 RX ORDER — NYSTATIN 100000 U/G
CREAM TOPICAL 3 TIMES DAILY
Qty: 30 G | Refills: 2 | Status: SHIPPED | OUTPATIENT
Start: 2017-08-09 | End: 2017-08-23

## 2017-08-09 NOTE — NURSING NOTE
Chief Complaint   Patient presents with     Derm Problem     rash on buttocks X 3 weeks       Initial /72  Pulse 70  Temp 98  F (36.7  C) (Oral)  Wt 118 lb 3.2 oz (53.6 kg)  SpO2 97%  BMI 23.08 kg/m2 Estimated body mass index is 23.08 kg/(m^2) as calculated from the following:    Height as of 1/11/17: 5' (1.524 m).    Weight as of this encounter: 118 lb 3.2 oz (53.6 kg).  Medication Reconciliation: complete   Dena Donahue, CMA

## 2017-08-09 NOTE — MR AVS SNAPSHOT
After Visit Summary   8/9/2017    Steffany Brown    MRN: [id number]           Patient Information     Date Of Birth          8/24/1930        Visit Information        Provider Department      8/9/2017 3:20 PM Leo Hancock MD [facility]        Today's Diagnoses     Yeast infection of the skin    -  1    Cellulitis of multiple sites of buttock          Care Instructions    *  Mild skin infection:  Keflex 500 mg thee times per day for 10 days    *  Yeast infection:  Nystatin cream three times per day onto the affected area for 10-14 days.     *  Try to avoid over-cleansing the area, you will produce more irritation.     *  Avoid moisture in the area, consder Depends if any urine incontinence.     *  Consider a barrier cream like Desitin cream in the future if you notice a little irritation in the buttock area.     *  Continue all medications at the same doses.  Contact your usual pharmacy if you need refills.     *  If you do not improve, then let us know.                  Follow-ups after your visit        Who to contact     If you have questions or need follow up information about today's clinic visit or your schedule please contact [facility] directly at [phone].  Normal or non-critical lab and imaging results will be communicated to you by MyChart, letter or phone within 4 business days after the clinic has received the results. If you do not hear from us within 7 days, please contact the clinic through GetAFivehart or phone. If you have a critical or abnormal lab result, we will notify you by phone as soon as possible.  Submit refill requests through [employer] or call your pharmacy and they will forward the refill request to us. Please allow 3 business days for your refill to be completed.          Additional Information About Your Visit        MyChart Information     [employer] gives you secure access to your electronic health record. If you  see a primary care provider, you can also send messages to your care team and make appointments. If you have questions, please call your primary care clinic.  If you do not have a primary care provider, please call [phone] and they will assist you.        Care EveryWhere ID     This is your Care EveryWhere ID. This could be used by other organizations to access your [place] medical records  [id number]        Your Vitals Were     Pulse Temperature Pulse Oximetry BMI (Body Mass Index)          70 98  F (36.7  C) (Oral) 97% 23.08 kg/m2         Blood Pressure from Last 3 Encounters:   08/09/17 148/72   08/07/17 126/74   05/26/17 134/76    Weight from Last 3 Encounters:   08/09/17 118 lb 3.2 oz (53.6 kg)   08/07/17 118 lb (53.5 kg)   05/26/17 118 lb 1.6 oz (53.6 kg)              Today, you had the following     No orders found for display         Today's Medication Changes          These changes are accurate as of: 8/9/17  3:50 PM.  If you have any questions, ask your nurse or doctor.               Start taking these medicines.        Dose/Directions    cephALEXin 500 MG capsule   Commonly known as:  KEFLEX   Used for:  Cellulitis of multiple sites of buttock   Started by:  Leo Hancock MD        Dose:  500 mg   Take 1 capsule (500 mg) by mouth 3 times daily for 10 days   Quantity:  30 capsule   Refills:  0       nystatin cream   Commonly known as:  MYCOSTATIN   Used for:  Yeast infection of the skin   Started by:  Leo Hancock MD        Apply topically 3 times daily for 14 days   Quantity:  30 g   Refills:  2            Where to get your medicines      These medications were sent to [facility] [address]  [address], [facility] [address]     Phone:  [phone]     cephALEXin 500 MG capsule    nystatin cream                Primary Care Provider Office Phone # Fax #    Leo Hancock [phone]       [address] [facility]  [facility] [address]        Equal Access to Services     LISA BARRERA : Hadii roberto hammer eze Socristi, waaxda luqadaha, qaybta kaalmavioletta rolon, oanh gleason. So [facility] [phone].    ATENCIÓN: Si habla español, tiene a garcia disposición servicios gratuitos de asistencia lingüística. Angelicaame al [phone].    We comply with applicable federal civil rights laws and [place] laws. We do not discriminate on the basis of race, color, national origin, age, disability sex, sexual orientation or gender identity.            Thank you!     Thank you for choosing [facility]  for your care. Our goal is always to provide you with excellent care. Hearing back from our patients is one way we can continue to improve our services. Please take a few minutes to complete the written survey that you may receive in the mail after your visit with us. Thank you!             Your Updated Medication List - Protect others around you: Learn how to safely use, store and throw away your medicines at www.disposemymeds.org.          This list is accurate as of: 8/9/17  3:50 PM.  Always use your most recent med list.                   Brand Name Dispense Instructions for use Diagnosis    acetaminophen 500 MG tablet    TYLENOL     Take 500-1,000 mg by mouth every 6 hours as needed        atenolol 50 MG tablet    TENORMIN    90 tablet    Take 1 tablet (50 mg) by mouth daily    Benign essential hypertension       carbamide peroxide 6.5 % otic solution    DEBROX    30 mL    Place 5-10 drops into both ears 2 times daily    Bilateral impacted cerumen       cephALEXin 500 MG capsule    KEFLEX    30 capsule    Take 1 capsule (500 mg) by mouth 3 times daily for 10 days    Cellulitis of multiple sites of buttock       cholecalciferol 2000 UNITS Caps      Take 2 capsules by mouth 2 times daily        clopidogrel 75 MG tablet    PLAVIX    90 tablet    Take 1 tablet (75 mg) by mouth daily    Hemiplegia  of dominant side as late effect following cerebrovascular disease (H)       fluticasone 50 MCG/ACT spray    FLONASE    16 g    Spray 2 sprays into both nostrils daily as needed for rhinitis    Other seasonal allergic rhinitis       loratadine 10 MG tablet    CLARITIN     Take 10 mg by mouth daily        lovastatin 40 MG tablet    MEVACOR    90 tablet    Take 1 tablet (40 mg) by mouth At Bedtime    Hyperlipidemia LDL goal <100       nystatin cream    MYCOSTATIN    30 g    Apply topically 3 times daily for 14 days    Yeast infection of the skin       PRESERVISION AREDS Caps      Take 1 capsule by mouth 2 times daily    Benign essential hypertension

## 2017-08-09 NOTE — PATIENT INSTRUCTIONS
*  Mild skin infection:  Keflex 500 mg thee times per day for 10 days    *  Yeast infection:  Nystatin cream three times per day onto the affected area for 10-14 days.     *  Try to avoid over-cleansing the area, you will produce more irritation.     *  Avoid moisture in the area, consder Depends if any urine incontinence.     *  Consider a barrier cream like Desitin cream in the future if you notice a little irritation in the buttock area.     *  Continue all medications at the same doses.  Contact your usual pharmacy if you need refills.     *  If you do not improve, then let us know.

## 2017-08-09 NOTE — PROGRESS NOTES
SUBJECTIVE:                                                    Steffany Brown is a 86 year old female who presents to clinic today for the following health issues:      Concern - Rash  Onset: 3 weeks    Description:   Red, itching, painful rash on buttocks. At first they thought it was hemorrhoids but rash get bigger    Intensity: moderate    Progression of Symptoms:  same and constant    Accompanying Signs & Symptoms:  Denies fevers    Previous history of similar problem:   no    Precipitating factors:   Worsened by:     Alleviating factors:  Improved by: nothing    Therapies Tried and outcome: pt has tried all OTC creams and wipes w/ out relief          Problem list and histories reviewed & adjusted, as indicated.  Additional history: as documented        Reviewed and updated as needed this visit by clinical staffTobacco  Allergies       Reviewed and updated as needed this visit by Provider           Past Medical History:  ---------------------------  Past Medical History:   Diagnosis Date     Age-related macular degeneration, dry, both eyes 10/24/14    per [facility]     Diverticulosis of colon (without mention of hemorrhage)     Several diverticuli seen on colonoscopy     Eczema      Family history of colon cancer     mother  from colon cancer age 89     Hip fracture, intertrochanteric (H) 9/10/09    left hip fracture, S/P ORIF     History of small bowel obstruction ,      Osteoporosis, unspecified      Other and unspecified hyperlipidemia      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     Other speech disturbance 3/05    chronic dysarthria since CVI     Pelvic mass in female 10/3/11    s/p LEODAN/BSO; pathology:  Serous cystadenofibroma, 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     Personal history of colonic polyps      Psoriasis      Rheumatoid arthritis(714.0)      Unspecified essential hypertension         Past Surgical History:  ---------------------------  Past Surgical History:   Procedure Laterality Date     C APPENDECTOMY  1985     COLONOSCOPY  5/14/03    Diverticulosis, single small polyp removed (at [facility])     COLONOSCOPY  6/26/08    diverticulosis, few scattered mild angioectasias, no polyps     COLONOSCOPY  7/17/2013    Procedure: COLONOSCOPY;  COLONOSCOPY ;  Surgeon: Romario Watters MD;  Location:  GI     [facility]       FRACTURE TX, HIP RT/LT  9/10/09    Open reduction with IM nailing of left hip fracture using a gamma nail     HC COLONOSCOPY THRU STOMA W BIOPSY/CAUTERY TUMOR/POLYP/LESION  3/00    colonoscopy adenamatous polyp ([facility])     HYSTERECTOMY, LEODAN  10/03/11    s/p LEODAN/BSO to remove pelvic mass; pathology:  Serous cystadenofibroma, 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     OPEN REDUCTION INTERNAL FIXATION ANKLE  7/11/2014    Procedure: OPEN REDUCTION INTERNAL FIXATION ANKLE;  Surgeon: Emmanuel Gomez MD;  Location:  OR     OPEN REDUCTION INTERNAL FIXATION HIP NAILING Right 7/20/2016    Procedure: OPEN REDUCTION INTERNAL FIXATION HIP NAILING;  Surgeon: Jeronimo Giordano MD;  Location:  OR     RECTAL SURGERY  1971    anterior laproscopic repair of sigmoid prolapse     SMALL INTESTINE SURGERY       STRESS ECHO ([facility]RO)  4/98    stress echo (negative)     SURGICAL HISTORY OF -   3/12/05    attempted angioplasty/stent of right middle cerebral artery ( M1 segment), no effective       Current Medications:  ---------------------------  Current Outpatient Prescriptions   Medication Sig Dispense Refill     clopidogrel (PLAVIX) 75 MG tablet Take 1 tablet (75 mg) by mouth daily 90 tablet 3     lovastatin (MEVACOR) 40 MG tablet Take 1 tablet (40 mg) by mouth At Bedtime 90 tablet 3     atenolol (TENORMIN) 50 MG tablet Take 1 tablet (50 mg) by mouth daily 90 tablet 3     fluticasone (FLONASE) 50 MCG/ACT spray Spray 2 sprays into both nostrils daily as needed for rhinitis 16 g 5      acetaminophen (TYLENOL) 500 MG tablet Take 500-1,000 mg by mouth every 6 hours as needed        cholecalciferol 2000 UNITS CAPS Take 2 capsules by mouth 2 times daily        Multiple Vitamins-Minerals (PRESERVISION AREDS) CAPS Take 1 capsule by mouth 2 times daily        loratadine (CLARITIN) 10 MG tablet Take 10 mg by mouth daily       carbamide peroxide (DEBROX) 6.5 % otic solution Place 5-10 drops into both ears 2 times daily (Patient not taking: Reported on 2017) 30 mL 0       Allergies:  -------------  Allergies   Allergen Reactions     Atorvastatin Calcium Cramps     Leg cramps     Lisinopril Swelling and Rash       Social History:  -------------------  Social History     Social History     Marital status: Single     Spouse name: N/A     Number of children: 0     Years of education: N/A     Occupational History      Other     [employer]     Social History Main Topics     Smoking status: Never Smoker     Smokeless tobacco: Never Used      Comment: pt quit smoking in 1948     Alcohol use Yes      Comment: rare     Drug use: No     Sexual activity: No     Other Topics Concern     Not on file     Social History Narrative    Living arrangements - the patient lives with her friendKamini       Family Medical History:  ------------------------------  Family History   Problem Relation Age of Onset     CANCER Mother       of colon ca age 89     Cancer - colorectal Mother      Cardiovascular Father       at age 65     CANCER Brother       of lung ca age 65         ROS:  REVIEW OF SYSTEMS:    RESP: negative for cough, dyspnea, wheezing, hemoptysis  CV: negative for chest pain, palpitations  GI: negative for dysphagia, N/V, pain, melena, diarrhea and constipation  NEURO: POS for lingering chronic sequelae of prior stroke with word findings, riht sided weakness, etc.  All unchaged from prior exam.     OBJECTIVE:                                                    /72  Pulse  70  Temp 98  F (36.7  C) (Oral)  Wt 118 lb 3.2 oz (53.6 kg)  SpO2 97%  BMI 23.08 kg/m2     GENERAL alert and no distress  EYES:  Normal sclera,conjunctiva, EOMI  HENT: oral and posterior pharynx without lesions or erythema  NECK: Neck supple. No LAD, without thyroidmegaly or JVD., Carotids without bruits.  RESP: Clear to ausculation bilaterally without wheezes or crackles. Normal BS in all fields.  CV: RRR normal S1S2 without murmurs, rubs or gallops. PMI normal  LYMPH: no cervical lymph adenopathy appreciated  PSYCH: Alert and oriented times 3; speech- coherent, chroncially dysarthric  SKIN:  No obvious significant skin lesions on visible portions of face   Pernieal/groin area erytehamtous, no weeping skin lesions, no cysts or masses.         ASSESSMENT/PLAN:                                                      (B37.2) Yeast infection of the skin  (primary encounter diagnosis)  Comment:   Plan: nystatin (MYCOSTATIN) cream            (L03.317) Cellulitis of multiple sites of buttock  Comment: suspect mild cellulitis.   Plan: cephALEXin (KEFLEX) 500 MG capsule           *  Mild skin infection:  Keflex 500 mg thee times per day for 10 days    *  Yeast infection:  Nystatin cream three times per day onto the affected area for 10-14 days.     *  Try to avoid over-cleansing the area, you will produce more irritation.     *  Avoid moisture in the area, consder Depends if any urine incontinence.     *  Consider a barrier cream like Desitin cream in the future if you notice a little irritation in the buttock area.     *  Continue all medications at the same doses.  Contact your usual pharmacy if you need refills.     *  If you do not improve, then let us know.                See Patient Instructions    OTNY SINGH M.D., MD  [facility]

## 2017-08-19 ENCOUNTER — TELEPHONE (OUTPATIENT)
Dept: INTERNAL MEDICINE | Facility: CLINIC | Age: 82
End: 2017-08-19

## 2017-08-19 DIAGNOSIS — L30.9 DERMATITIS: ICD-10-CM

## 2017-08-19 NOTE — TELEPHONE ENCOUNTER
Dr. Hancock,    The betamethasone is not covered by [employer] insurance and requires a Prior Authorization.  The cash price is $120 for 45 grams.  The augmented betamethasone does not require a prior authorization and has a copay of $80 for 45 grams.    Would it be ok to switch to the augmented betamethasone or would you like to try a different steroid cream?    Thanks,    Andrae Quinones, PharmD  [facility]  [phone]

## 2017-08-20 RX ORDER — BETAMETHASONE DIPROPIONATE 0.5 MG/G
CREAM TOPICAL
Qty: 50 G | Refills: 1 | Status: SHIPPED | OUTPATIENT
Start: 2017-08-20 | End: 2017-11-15

## 2017-09-17 DIAGNOSIS — Z79.2 PROPHYLACTIC ANTIBIOTIC: ICD-10-CM

## 2017-09-18 NOTE — TELEPHONE ENCOUNTER
amoxicillin      Last Written Prescription Date: na  Last Fill Quantity: na,  # refills: na   Last Office Visit with G, P or [facility] prescribing provider: 08/09/17

## 2017-09-19 RX ORDER — AMOXICILLIN 500 MG/1
CAPSULE ORAL
Qty: 4 CAPSULE | Refills: 2 | Status: SHIPPED | OUTPATIENT
Start: 2017-09-19 | End: 2018-07-08

## 2017-10-18 ENCOUNTER — HOSPITAL ENCOUNTER (EMERGENCY)
Facility: CLINIC | Age: 82
Discharge: HOME | End: 2017-10-18
Attending: EMERGENCY MEDICINE | Admitting: EMERGENCY MEDICINE
Payer: MEDICARE

## 2017-10-18 ENCOUNTER — APPOINTMENT (OUTPATIENT)
Dept: CT IMAGING | Facility: CLINIC | Age: 82
End: 2017-10-18
Attending: EMERGENCY MEDICINE
Payer: MEDICARE

## 2017-10-18 VITALS
HEIGHT: 60 IN | HEART RATE: 74 BPM | DIASTOLIC BLOOD PRESSURE: 76 MMHG | TEMPERATURE: 98 F | RESPIRATION RATE: 18 BRPM | BODY MASS INDEX: 23.16 KG/M2 | SYSTOLIC BLOOD PRESSURE: 159 MMHG | OXYGEN SATURATION: 92 % | WEIGHT: 118 LBS

## 2017-10-18 DIAGNOSIS — E87.1 HYPONATREMIA: ICD-10-CM

## 2017-10-18 DIAGNOSIS — R10.32 ABDOMINAL PAIN, LEFT LOWER QUADRANT: ICD-10-CM

## 2017-10-18 LAB
ALBUMIN SERPL-MCNC: 3.2 G/DL (ref 3.4–5)
ALBUMIN UR-MCNC: NEGATIVE MG/DL
ALP SERPL-CCNC: 104 U/L (ref 40–150)
ALT SERPL W P-5'-P-CCNC: 14 U/L (ref 0–50)
ANION GAP SERPL CALCULATED.3IONS-SCNC: 7 MMOL/L (ref 3–14)
APPEARANCE UR: CLEAR
AST SERPL W P-5'-P-CCNC: 16 U/L (ref 0–45)
BASOPHILS # BLD AUTO: 0.1 10E9/L (ref 0–0.2)
BASOPHILS NFR BLD AUTO: 0.7 %
BILIRUB SERPL-MCNC: 0.3 MG/DL (ref 0.2–1.3)
BILIRUB UR QL STRIP: NEGATIVE
BUN SERPL-MCNC: 7 MG/DL (ref 7–30)
CALCIUM SERPL-MCNC: 9.5 MG/DL (ref 8.5–10.1)
CHLORIDE SERPL-SCNC: 94 MMOL/L (ref 94–109)
CO2 SERPL-SCNC: 29 MMOL/L (ref 20–32)
COLOR UR AUTO: YELLOW
CREAT SERPL-MCNC: 0.58 MG/DL (ref 0.52–1.04)
DIFFERENTIAL METHOD BLD: NORMAL
EOSINOPHIL # BLD AUTO: 0.5 10E9/L (ref 0–0.7)
EOSINOPHIL NFR BLD AUTO: 5 %
ERYTHROCYTE [DISTWIDTH] IN BLOOD BY AUTOMATED COUNT: 12.7 % (ref 10–15)
GFR SERPL CREATININE-BSD FRML MDRD: >90 ML/MIN/1.7M2
GLUCOSE SERPL-MCNC: 100 MG/DL (ref 70–99)
GLUCOSE UR STRIP-MCNC: NEGATIVE MG/DL
HCT VFR BLD AUTO: 38.9 % (ref 35–47)
HGB BLD-MCNC: 13.5 G/DL (ref 11.7–15.7)
HGB UR QL STRIP: NEGATIVE
IMM GRANULOCYTES # BLD: 0 10E9/L (ref 0–0.4)
IMM GRANULOCYTES NFR BLD: 0.3 %
KETONES UR STRIP-MCNC: NEGATIVE MG/DL
LEUKOCYTE ESTERASE UR QL STRIP: NEGATIVE
LIPASE SERPL-CCNC: 156 U/L (ref 73–393)
LYMPHOCYTES # BLD AUTO: 2 10E9/L (ref 0.8–5.3)
LYMPHOCYTES NFR BLD AUTO: 20.1 %
MCH RBC QN AUTO: 30.5 PG (ref 26.5–33)
MCHC RBC AUTO-ENTMCNC: 34.7 G/DL (ref 31.5–36.5)
MCV RBC AUTO: 88 FL (ref 78–100)
MONOCYTES # BLD AUTO: 1.3 10E9/L (ref 0–1.3)
MONOCYTES NFR BLD AUTO: 13.6 %
NEUTROPHILS # BLD AUTO: 5.9 10E9/L (ref 1.6–8.3)
NEUTROPHILS NFR BLD AUTO: 60.3 %
NITRATE UR QL: NEGATIVE
NRBC # BLD AUTO: 0 10*3/UL
NRBC BLD AUTO-RTO: 0 /100
PH UR STRIP: 7 PH (ref 5–7)
PLATELET # BLD AUTO: 401 10E9/L (ref 150–450)
POTASSIUM SERPL-SCNC: 4.1 MMOL/L (ref 3.4–5.3)
PROT SERPL-MCNC: 7.9 G/DL (ref 6.8–8.8)
RBC # BLD AUTO: 4.42 10E12/L (ref 3.8–5.2)
SODIUM SERPL-SCNC: 130 MMOL/L (ref 133–144)
SOURCE: NORMAL
SP GR UR STRIP: 1.01 (ref 1–1.03)
UROBILINOGEN UR STRIP-ACNC: 0.2 EU/DL (ref 0.2–1)
WBC # BLD AUTO: 9.7 10E9/L (ref 4–11)

## 2017-10-18 PROCEDURE — 80053 COMPREHEN METABOLIC PANEL: CPT | Performed by: EMERGENCY MEDICINE

## 2017-10-18 PROCEDURE — 25000128 H RX IP 250 OP 636: Performed by: EMERGENCY MEDICINE

## 2017-10-18 PROCEDURE — 81003 URINALYSIS AUTO W/O SCOPE: CPT | Performed by: EMERGENCY MEDICINE

## 2017-10-18 PROCEDURE — 25000125 ZZHC RX 250: Performed by: EMERGENCY MEDICINE

## 2017-10-18 PROCEDURE — 74177 CT ABD & PELVIS W/CONTRAST: CPT

## 2017-10-18 PROCEDURE — 83690 ASSAY OF LIPASE: CPT | Performed by: EMERGENCY MEDICINE

## 2017-10-18 PROCEDURE — 99285 EMERGENCY DEPT VISIT HI MDM: CPT | Mod: 25

## 2017-10-18 PROCEDURE — 85025 COMPLETE CBC W/AUTO DIFF WBC: CPT | Performed by: EMERGENCY MEDICINE

## 2017-10-18 RX ORDER — IOPAMIDOL 755 MG/ML
60 INJECTION, SOLUTION INTRAVASCULAR ONCE
Status: COMPLETED | OUTPATIENT
Start: 2017-10-18 | End: 2017-10-18

## 2017-10-18 RX ADMIN — IOPAMIDOL 60 ML: 755 INJECTION, SOLUTION INTRAVENOUS at 16:02

## 2017-10-18 RX ADMIN — SODIUM CHLORIDE 60 ML: 9 INJECTION, SOLUTION INTRAVENOUS at 16:02

## 2017-10-18 ASSESSMENT — ENCOUNTER SYMPTOMS
ABDOMINAL PAIN: 1
DYSURIA: 0
FEVER: 0
DIARRHEA: 0
HEMATURIA: 0
CONSTIPATION: 0

## 2017-10-18 NOTE — ED AVS SNAPSHOT
Emergency Department    [address] [place] [id number]    Phone:  [phone]    Fax:  [phone]                                       Steffany Brown   MRN: [id number]    Department:   Emergency Department   Date of Visit:  10/18/2017           After Visit Summary Signature Page     I have received my discharge instructions, and my questions have been answered. I have discussed any challenges I see with this plan with the nurse or doctor.    ..........................................................................................................................................  Patient/Patient Representative Signature      ..........................................................................................................................................  Patient Representative Print Name and Relationship to Patient    ..................................................               ................................................  Date                                            Time    ..........................................................................................................................................  Reviewed by Signature/Title    ...................................................              ..............................................  Date                                                            Time

## 2017-10-18 NOTE — DISCHARGE INSTRUCTIONS
Make sure that you are urinating regularly  The sodium was slightly low today but it is always low and about the same as it usually is. Have it rechecked by primary care provider    Discharge Instructions  Abdominal Pain    Abdominal pain (belly pain) can be caused by many things. Your evaluation today does not show the exact cause for your pain. Your provider today has decided that it is unlikely your pain is due to a life threatening problem, or a problem requiring surgery or hospital admission. Sometimes those problems cannot be found right away, so it is very important that you follow up as directed.  Sometimes only the changes which occur over time allow the cause of your pain to be found.    Generally, every Emergency Department visit should have a follow-up clinic visit with either a primary or a specialty clinic/provider. Please follow-up as instructed by your emergency provider today. With abdominal pain, we often recommend very close follow-up, such as the following day.    ADULTS:  Return to the Emergency Department right away if:      You get an oral temperature above 102oF or as directed by your provider.    You have blood in your stools. This may be bright red or appear as black, tarry stools.      You keep vomiting (throwing up) or cannot drink liquids.    You see blood when you vomit.     You cannot have a bowel movement or you cannot pass gas.    Your stomach gets bloated or bigger.    Your skin or the whites of your eyes look yellow.    You faint.    You have bloody, frequent or painful urination (peeing).    You have new symptoms or anything that worries you.    CHILDREN:  Return to the Emergency Department right away if your child has any of the above-listed symptoms or the following:      Pushes your hand away or screams/cries when his/her belly is touched.    You notice your child is very fussy or weak.    Your child is very tired and is too tired to eat or drink.    Your child is dehydrated.   Signs of dehydration can be:  o Significant change in the amount of wet diapers/urine.  o Your infant or child starts to have dry mouth and lips, or no saliva (spit) or tears.    PREGNANT WOMEN:  Return to the Emergency Department right away if you have any of the above-listed symptoms or the following:      You have bleeding, leaking fluid or passing tissue from the vagina.    You have worse pain or cramping, or pain in your shoulder or back.    You have vomiting that will not stop.    You have a temperature of 100oF or more.    Your baby is not moving as much as usual.    You faint.    You get a bad headache with or without eye problems and abdominal pain.    You have a seizure.    You have unusual discharge from your vagina and abdominal pain.    Abdominal pain is pretty common during pregnancy.  Your pain may or may not be related to your pregnancy. You should follow-up closely with your OB provider so they can evaluate you and your baby.  Until you follow-up with your regular provider, do the following:       Avoid sex and do not put anything in your vagina.    Drink clear fluids.    Only take medications approved by your provider.    MORE INFORMATION:    Appendicitis:  A possible cause of abdominal pain in any person who still has their appendix is acute appendicitis. Appendicitis is often hard to diagnose.  Testing does not always rule out early appendicitis or other causes of abdominal pain. Close follow-up with your provider and re-evaluations may be needed to figure out the reason for your abdominal pain.    Follow-up:  It is very important that you make an appointment with your clinic and go to the appointment.  If you do not follow-up with your primary provider, it may result in missing an important development which could result in permanent injury or disability and/or lasting pain.  If there is any problem keeping your appointment, call your provider or return to the Emergency  "Department.    Medications:  Take your medications as directed by your provider today.  Before using over-the-counter medications, ask your provider and make sure to take the medications as directed.  If you have any questions about medications, ask your provider.    Diet:  Resume your normal diet as much as possible, but do not eat fried, fatty or spicy foods while you have pain.  Do not drink alcohol or have caffeine.  Do not smoke tobacco.    Probiotics: If you have been given an antibiotic, you may want to also take a probiotic pill or eat yogurt with live cultures. Probiotics have \"good bacteria\" to help your intestines stay healthy. Studies have shown that probiotics help prevent diarrhea (loose stools) and other intestine problems (including C. diff infection) when you take antibiotics. You can buy these without a prescription in the pharmacy section of the store.     If you were given a prescription for medicine here today, be sure to read all of the information (including the package insert) that comes with your prescription.  This will include important information about the medicine, its side effects, and any warnings that you need to know about.  The pharmacist who fills the prescription can provide more information and answer questions you may have about the medicine.  If you have questions or concerns that the pharmacist cannot address, please call or return to the Emergency Department.       Remember that you can always come back to the Emergency Department if you are not able to see your regular provider in the amount of time listed above, if you get any new symptoms, or if there is anything that worries you.    "

## 2017-10-18 NOTE — ED PROVIDER NOTES
History     Chief Complaint:  Abdominal Pain    HPI   Steffany Brown is a 87 year old female, with a history of stroke with residual aphasia, who presents with abdominal pain. The patient's caregiver states that the patient has had approximately 4 days of left lower quadrant abdominal pain. Was quite tender to palpation four days ago which has improved. She denies changes in bowel movements, fever, or changes in urinary symptoms such as dysuria. The patient's caregiver states that the patient is at her baseline.     Allergies:  Atorvastatin Calcium  Lisinopril      Medications:    Debrox  Plavix  Mevacor  Flonase  Claritin     Past Medical History:    Macular degeneration   Diverticulosis of colon  Hip fracture - intertrochanteric  Small bowel obstruction  Osteoporosis  Hyperlipidemia  Generalized ischemic cerebrovascular disease  Pelvic mass  Psoriasis  Rheumatoid arthritis  Hypertension    Past Surgical History:    Appendectomy  D & C  Open reduction of left hip  Hysterectomy  Open reduction internal fixation ankle  Rectal surgery  Small intestine surgery    Family History:    Colon cancer  Lung cancer    Social History:  Presents with her caregiver.   Positive for alcohol use.   Negative for tobacco use.   Marital Status:  Single [1]     Review of Systems   Constitutional: Negative for fever.   Gastrointestinal: Positive for abdominal pain. Negative for constipation and diarrhea.   Genitourinary: Negative for dysuria and hematuria.   All other systems reviewed and are negative.      Physical Exam   First Vitals:  BP: 195/83  Pulse: 74  Temp: 98  F (36.7  C)  Resp: 20  Height: 152.4 cm (5')  Weight: 53.5 kg (118 lb)  SpO2: 93 %    Physical Exam  General: Resting comfortably on the gurney  Eyes:  The pupils are equal and round  ENT:    Moist mucous membranes  Neck:  Normal range of motion  CV:  Regular rate and rhythm    Skin warm and well perfused   Resp:  Lungs are clear    Non-labored    No rales    No  wheezing   GI:  Abdomen is soft, there is no rigidity    No distension    No rebound tenderness     Unable to elicit focal LLQ tenderness  MS:  Normal muscular tone  Skin:  No rash or acute skin lesions noted  Neuro:   Awake, alert.      Aphasia (baseline)    Face is symmetric.     Weakness on right from CVA (baseline)  Psych:  Normal affect.  Appropriate interactions.    Emergency Department Course     Imaging:  Radiographic findings were communicated with the patient who voiced understanding of the findings.  CT Abdomen/Pelvis with IV contrast:   1. No acute abnormality. No bowel obstruction or inflammation.  2. The urinary bladder is very distended but otherwise appears normal. As per radiology.    Laboratory:  CBC: WBC: 9.7, HGB: 13.5, PLT: 401  CMP: Glucose 100(H), Albumin: 3.2(L), NA: 130(L), o/w WNL (Creatinine: 0.58)    UA with micro: all negative  Lipase:156    Emergency Department Course:  Nursing notes and vitals reviewed. I performed an exam of the patient as documented above.     Blood drawn. This was sent to the laboratory for further testing, results above. The patient provided a urine sample here in the emergency department. This was sent for laboratory testing, findings above.      The patient was sent for an abdominal CT while in the emergency department, findings above.     1635 I rechecked the patient and discussed the results of her workup thus far.     Findings and plan explained to the Patient and caregiver. Patient discharged home with instructions regarding supportive care, medications, and reasons to return. The importance of close follow-up was reviewed.     I personally reviewed the laboratory results with the Patient and caregiver and answered all related questions prior to discharge.       Impression & Plan      Medical Decision Making:  Steffany Brown is a 87 year old female who presents to the ED with abdominal pain. Unable to reproduce LLQ abdominal tenderness on exam though  patient points to LLQ as to location of pain. Vital signs are normal. Pain was actually worse several days ago, seems to be improved today. Nurse note states that she is having vomiting but in discussion with patient and caregiver they deny this.     Laboratory evaluation shows hyponatremia but no leukocytosis and UA is unremarkable. CT abdomen performed and no acute abnormality was found. They do note that the urinary bladder is very distended. Patient did urinate after this. The caregiver does note that there could be a possibility that pain was more severe a few days ago, could have been related to some bladder distension, as the pain was early in the morning after the patient had not urinated for several hours all night. Caregiver will make sure that patient is urinating at frequent intervals to ensure no bladder distension. No Boland indicated given that she is able to urinate. Patient reevaluated and again not able to elicit any lower abdominal tenderness. Recommended follow-up with PCP and reasons to return to the ED were discussed with patient and caregiver.     Diagnosis:    ICD-10-CM   1. Abdominal pain, left lower quadrant R10.32   2. Hyponatremia E87.1       Disposition:  discharged to home    IManisha am serving as a scribe on 10/18/2017 at 3:17 PM to personally document services performed by Naz Gregory MD based on my observations and the provider's statements to me.      Manisha Watts  10/18/2017    EMERGENCY DEPARTMENT       Naz Gregory MD  10/19/17 0139

## 2017-10-18 NOTE — ED AVS SNAPSHOT
Emergency Department    [address] [facility] [address]    Phone:  [phone]    Fax:  [phone]                                       Steffany Brown   MRN: [id number]    Department:   Emergency Department   Date of Visit:  10/18/2017           Patient Information     Date Of Birth          8/24/1930        Your diagnoses for this visit were:     Abdominal pain, left lower quadrant     Hyponatremia        You were seen by Naz Gregory MD.      Follow-up Information     Schedule an appointment as soon as possible for a visit with Leo Hancock MD.    Specialty:  Internal Medicine    Contact information:    [address] [facility] [id number] [phone]          Follow up with  Emergency Department.    Specialty:  EMERGENCY MEDICINE    Why:  If symptoms worsen    Contact information:    [address] [facility] [address] [phone]        Discharge Instructions       Make sure that you are urinating regularly  The sodium was slightly low today but it is always low and about the same as it usually is. Have it rechecked by primary care provider    Discharge Instructions  Abdominal Pain    Abdominal pain (belly pain) can be caused by many things. Your evaluation today does not show the exact cause for your pain. Your provider today has decided that it is unlikely your pain is due to a life threatening problem, or a problem requiring surgery or hospital admission. Sometimes those problems cannot be found right away, so it is very important that you follow up as directed.  Sometimes only the changes which occur over time allow the cause of your pain to be found.    Generally, every Emergency Department visit should have a follow-up clinic visit with either a primary or a specialty clinic/provider. Please follow-up as instructed by your emergency provider today. With abdominal pain, we often recommend very close follow-up, such as the following  day.    ADULTS:  Return to the Emergency Department right away if:      You get an oral temperature above 102oF or as directed by your provider.    You have blood in your stools. This may be bright red or appear as black, tarry stools.      You keep vomiting (throwing up) or cannot drink liquids.    You see blood when you vomit.     You cannot have a bowel movement or you cannot pass gas.    Your stomach gets bloated or bigger.    Your skin or the whites of your eyes look yellow.    You faint.    You have bloody, frequent or painful urination (peeing).    You have new symptoms or anything that worries you.    CHILDREN:  Return to the Emergency Department right away if your child has any of the above-listed symptoms or the following:      Pushes your hand away or screams/cries when his/her belly is touched.    You notice your child is very fussy or weak.    Your child is very tired and is too tired to eat or drink.    Your child is dehydrated.  Signs of dehydration can be:  o Significant change in the amount of wet diapers/urine.  o Your infant or child starts to have dry mouth and lips, or no saliva (spit) or tears.    PREGNANT WOMEN:  Return to the Emergency Department right away if you have any of the above-listed symptoms or the following:      You have bleeding, leaking fluid or passing tissue from the vagina.    You have worse pain or cramping, or pain in your shoulder or back.    You have vomiting that will not stop.    You have a temperature of 100oF or more.    Your baby is not moving as much as usual.    You faint.    You get a bad headache with or without eye problems and abdominal pain.    You have a seizure.    You have unusual discharge from your vagina and abdominal pain.    Abdominal pain is pretty common during pregnancy.  Your pain may or may not be related to your pregnancy. You should follow-up closely with your OB provider so they can evaluate you and your baby.  Until you follow-up with your  "regular provider, do the following:       Avoid sex and do not put anything in your vagina.    Drink clear fluids.    Only take medications approved by your provider.    MORE INFORMATION:    Appendicitis:  A possible cause of abdominal pain in any person who still has their appendix is acute appendicitis. Appendicitis is often hard to diagnose.  Testing does not always rule out early appendicitis or other causes of abdominal pain. Close follow-up with your provider and re-evaluations may be needed to figure out the reason for your abdominal pain.    Follow-up:  It is very important that you make an appointment with your clinic and go to the appointment.  If you do not follow-up with your primary provider, it may result in missing an important development which could result in permanent injury or disability and/or lasting pain.  If there is any problem keeping your appointment, call your provider or return to the Emergency Department.    Medications:  Take your medications as directed by your provider today.  Before using over-the-counter medications, ask your provider and make sure to take the medications as directed.  If you have any questions about medications, ask your provider.    Diet:  Resume your normal diet as much as possible, but do not eat fried, fatty or spicy foods while you have pain.  Do not drink alcohol or have caffeine.  Do not smoke tobacco.    Probiotics: If you have been given an antibiotic, you may want to also take a probiotic pill or eat yogurt with live cultures. Probiotics have \"good bacteria\" to help your intestines stay healthy. Studies have shown that probiotics help prevent diarrhea (loose stools) and other intestine problems (including C. diff infection) when you take antibiotics. You can buy these without a prescription in the pharmacy section of the store.     If you were given a prescription for medicine here today, be sure to read all of the information (including the package " insert) that comes with your prescription.  This will include important information about the medicine, its side effects, and any warnings that you need to know about.  The pharmacist who fills the prescription can provide more information and answer questions you may have about the medicine.  If you have questions or concerns that the pharmacist cannot address, please call or return to the Emergency Department.       Remember that you can always come back to the Emergency Department if you are not able to see your regular provider in the amount of time listed above, if you get any new symptoms, or if there is anything that worries you.      Future Appointments        Provider Department Dept Phone Center    11/13/2017 7:45 AM [facility] [phone]     11/15/2017 7:40 AM Leo Hancock MD [facility] [phone]       24 Hour Appointment Hotline       To make an appointment at any [facility] [place], call [phone] ([phone]). If you don't have a family doctor or clinic, we will help you find one. [facility] are conveniently located to serve the needs of you and your family.             Review of your medicines      Our records show that you are taking the medicines listed below. If these are incorrect, please call your family doctor or clinic.        Dose / Directions Last dose taken    acetaminophen 500 MG tablet   Commonly known as:  TYLENOL   Dose:  500-1000 mg        Take 500-1,000 mg by mouth every 6 hours as needed   Refills:  0        amoxicillin 500 MG capsule   Commonly known as:  AMOXIL   Quantity:  4 capsule        TAKE 4 CAPSULES BY MOUTH FOR 1 TIME DOSE 1 HOUR PRIOR TO DENTAL PROCEDURE   Refills:  2        atenolol 50 MG tablet   Commonly known as:  TENORMIN   Dose:  50 mg   Quantity:  90 tablet        Take 1 tablet (50 mg) by mouth daily   Refills:  3        augmented betamethasone dipropionate 0.05 % cream    Commonly known as:  DIPROLENE-AF   Quantity:  50 g        Apply sparingly once or twice per day as needed to affected area until the skin is better, then stop; REPEAT AS NEEDED   Refills:  1        carbamide peroxide 6.5 % otic solution   Commonly known as:  DEBROX   Dose:  5-10 drop   Quantity:  30 mL        Place 5-10 drops into both ears 2 times daily   Refills:  0        cholecalciferol 2000 UNITS Caps   Dose:  2 capsule        Take 2 capsules by mouth 2 times daily   Refills:  0        clopidogrel 75 MG tablet   Commonly known as:  PLAVIX   Dose:  75 mg   Quantity:  90 tablet        Take 1 tablet (75 mg) by mouth daily   Refills:  3        fluticasone 50 MCG/ACT spray   Commonly known as:  FLONASE   Dose:  2 spray   Quantity:  16 g        Spray 2 sprays into both nostrils daily as needed for rhinitis   Refills:  5        loratadine 10 MG tablet   Commonly known as:  CLARITIN   Dose:  10 mg        Take 10 mg by mouth daily   Refills:  0        lovastatin 40 MG tablet   Commonly known as:  MEVACOR   Dose:  40 mg   Quantity:  90 tablet        Take 1 tablet (40 mg) by mouth At Bedtime   Refills:  3        PRESERVISION AREDS Caps   Dose:  1 capsule        Take 1 capsule by mouth 2 times daily   Refills:  0                Procedures and tests performed during your visit     *UA reflex to Microscopic (ED Lab POCT Only 3-11)    CBC with platelets differential    CT Abdomen Pelvis w Contrast    Comprehensive metabolic panel    Lipase      Orders Needing Specimen Collection     None      Pending Results     No orders found from 10/16/2017 to 10/19/2017.            Pending Culture Results     No orders found from 10/16/2017 to 10/19/2017.            Pending Results Instructions     If you had any lab results that were not finalized at the time of your Discharge, you can call the ED Lab Result RN at [phone]. You will be contacted by this team for any positive Lab results or changes in treatment. The nurses are  available 7 days a week from 10A to 6:30P.  You can leave a message 24 hours per day and they will return your call.        Test Results From Your Hospital Stay        10/18/2017  2:54 PM      Component Results     Component Value Ref Range & Units Status    WBC 9.7 4.0 - 11.0 10e9/L Final    RBC Count 4.42 3.8 - 5.2 10e12/L Final    Hemoglobin 13.5 11.7 - 15.7 g/dL Final    Hematocrit 38.9 35.0 - 47.0 % Final    MCV 88 78 - 100 fl Final    MCH 30.5 26.5 - 33.0 pg Final    MCHC 34.7 31.5 - 36.5 g/dL Final    RDW 12.7 10.0 - 15.0 % Final    Platelet Count 401 150 - 450 10e9/L Final    Diff Method Automated Method  Final    % Neutrophils 60.3 % Final    % Lymphocytes 20.1 % Final    % Monocytes 13.6 % Final    % Eosinophils 5.0 % Final    % Basophils 0.7 % Final    % Immature Granulocytes 0.3 % Final    Nucleated RBCs 0 0 /100 Final    Absolute Neutrophil 5.9 1.6 - 8.3 10e9/L Final    Absolute Lymphocytes 2.0 0.8 - 5.3 10e9/L Final    Absolute Monocytes 1.3 0.0 - 1.3 10e9/L Final    Absolute Eosinophils 0.5 0.0 - 0.7 10e9/L Final    Absolute Basophils 0.1 0.0 - 0.2 10e9/L Final    Abs Immature Granulocytes 0.0 0 - 0.4 10e9/L Final    Absolute Nucleated RBC 0.0  Final         10/18/2017  3:12 PM      Component Results     Component Value Ref Range & Units Status    Sodium 130 (L) 133 - 144 mmol/L Final    Potassium 4.1 3.4 - 5.3 mmol/L Final    Chloride 94 94 - 109 mmol/L Final    Carbon Dioxide 29 20 - 32 mmol/L Final    Anion Gap 7 3 - 14 mmol/L Final    Glucose 100 (H) 70 - 99 mg/dL Final    Urea Nitrogen 7 7 - 30 mg/dL Final    Creatinine 0.58 0.52 - 1.04 mg/dL Final    GFR Estimate >90 >60 mL/min/1.7m2 Final    Non  GFR Calc    GFR Estimate If Black >90 >60 mL/min/1.7m2 Final    African American GFR Calc    Calcium 9.5 8.5 - 10.1 mg/dL Final    Bilirubin Total 0.3 0.2 - 1.3 mg/dL Final    Albumin 3.2 (L) 3.4 - 5.0 g/dL Final    Protein Total 7.9 6.8 - 8.8 g/dL Final    Alkaline Phosphatase 104 40 -  150 U/L Final    ALT 14 0 - 50 U/L Final    AST 16 0 - 45 U/L Final         10/18/2017  3:11 PM      Component Results     Component Value Ref Range & Units Status    Lipase 156 73 - 393 U/L Final         10/18/2017  4:24 PM      Narrative     CT ABDOMEN AND PELVIS WITH CONTRAST   10/18/2017 4:08 PM      HISTORY:  Left lower quadrant abdominal pain.    TECHNIQUE:  CT abdomen and pelvis with intravenous contrast. Radiation  dose for this scan was reduced using automated exposure control,  adjustment of the mA and/or kV according to patient size, or iterative  reconstruction technique. 60 mL Isovue-370     COMPARISON:  1/11/2017    FINDINGS:  Abdomen:  There is atelectasis and scarring at the lung bases. The  heart is enlarged. The liver, spleen, gallbladder, pancreas, adrenal  glands and kidneys are normal in appearance. A borderline enlarged  left retroperitoneal lymph node at the level of the aortic bifurcation  is stable. No other lymph node enlargement. There is atherosclerotic  calcification of aorta and its branches. No aneurysm.    Pelvis: Orthopedic hardware in both hip causes streak artifact through  the inferior pelvis. Uterus is absent. No adnexal mass. The urinary  bladder is very distended but otherwise appears normal. There is no  bowel obstruction or inflammation. Postoperative changes in the bowel  in the right abdomen. Moderate amount of stool in the colon.  Degenerative disease in the spine.    IMPRESSION   1. No acute abnormality. No bowel obstruction or inflammation.  2. The urinary bladder is very distended but otherwise appears normal.    RICHY JEAN BAPTISTE MD         10/18/2017  4:48 PM      Component Results     Component Value Ref Range & Units Status    Color Urine Yellow  Final    Appearance Urine Clear  Final    Glucose Urine Negative NEG^Negative mg/dL Final    Bilirubin Urine Negative NEG^Negative Final    Ketones Urine Negative NEG^Negative mg/dL Final    Specific Gravity Urine 1.010 1.003  - 1.035 Final    Blood Urine Negative NEG^Negative Final    pH Urine 7.0 5.0 - 7.0 pH Final    Protein Albumin Urine Negative NEG^Negative mg/dL Final    Urobilinogen Urine 0.2 0.2 - 1.0 EU/dL Final    Nitrite Urine Negative NEG^Negative Final    Leukocyte Esterase Urine Negative NEG^Negative Final    Source Catheterized Urine  Final                Clinical Quality Measure: Blood Pressure Screening     Your blood pressure was checked while you were in the emergency department today. The last reading we obtained was  BP: 159/76 . Please read the guidelines below about what these numbers mean and what you should do about them.  If your systolic blood pressure (the top number) is less than 120 and your diastolic blood pressure (the bottom number) is less than 80, then your blood pressure is normal. There is nothing more that you need to do about it.  If your systolic blood pressure (the top number) is 120-139 or your diastolic blood pressure (the bottom number) is 80-89, your blood pressure may be higher than it should be. You should have your blood pressure rechecked within a year by a primary care provider.  If your systolic blood pressure (the top number) is 140 or greater or your diastolic blood pressure (the bottom number) is 90 or greater, you may have high blood pressure. High blood pressure is treatable, but if left untreated over time it can put you at risk for heart attack, stroke, or kidney failure. You should have your blood pressure rechecked by a primary care provider within the next 4 weeks.  If your provider in the emergency department today gave you specific instructions to follow-up with your doctor or provider even sooner than that, you should follow that instruction and not wait for up to 4 weeks for your follow-up visit.        Thank you for choosing [facility]       Thank you for choosing [facility] for your care. Our goal is always to provide you with excellent care. Hearing back from our patients is  one way we can continue to improve our services. Please take a few minutes to complete the written survey that you may receive in the mail after you visit with us. Thank you!        [employer] Information     [employer] gives you secure access to your electronic health record. If you see a primary care provider, you can also send messages to your care team and make appointments. If you have questions, please call your primary care clinic.  If you do not have a primary care provider, please call [phone] and they will assist you.        Care EveryWhere ID     This is your Care EveryWhere ID. This could be used by other organizations to access your [place] medical records  [id number]        Equal Access to Services     LISA BARRERA : Mercedez Chirinos, radha hope, cris rolon, oanh gleason. So [facility] [phone].    ATENCIÓN: Si habla español, tiene a garica disposición servicios gratuitos de asistencia lingüística. Llame al [phone].    We comply with applicable federal civil rights laws and [place] laws. We do not discriminate on the basis of race, color, national origin, age, disability, sex, sexual orientation, or gender identity.            After Visit Summary       This is your record. Keep this with you and show to your community pharmacist(s) and doctor(s) at your next visit.

## 2017-10-18 NOTE — ED NOTES
Bed: ED12  Expected date:   Expected time:   Means of arrival:   Comments:  lucio - 80's abd pain eta 2879

## 2017-10-19 ENCOUNTER — CARE COORDINATION (OUTPATIENT)
Dept: CARE COORDINATION | Facility: CLINIC | Age: 82
End: 2017-10-19

## 2017-10-19 NOTE — PROGRESS NOTES
Clinic Care Coordination Contact  OUTREACH    Referral Information:  Referral Source: ED Follow-Up  Reason for Contact: ED visit 10/18/2017-Left lower Quadrant Pain Hyponatremia   Care Conference: No     Universal Utilization:   ED Visits in last year: 3  Hospital visits in last year: 1  Last PCP appointment: 08/09/17  Missed Appointments: 0  Concerns: No  Multiple Providers or Specialists: no    Clinical Concerns:  Current Medical Concerns: CC spoke to Kamini(POA) caregiver who lives with the patient .  Kamini reports she is monitoring for any signs of a UTI,abdominal pain/distention.  Reports today no signs of abdominal pain or distention and is urinating well and having normal BM's    C  Clinical Pathway Name: None  Clinical Pathway: None    Medication Management:  No change      Functional Status:  Mobility Status: Dependent/Assisted by Another  Equipment Currently Used at Home:  (NA)    Psychosocial:  Current living arrangement:: I live in a private home (Aphasia)     Resources and Interventions:  Current Resources:  (NA);  (NA)  PAS Number: [id number]  Senior Linkage Line Referral Placed: 07/22/16  Advanced Care Plans/Directives on file:: Yes  Referrals Placed: Home Care, Transportation     Barriers: Dependent on caregiver   Strengths: Kamini is the patients caregiver 24/7  Patient/Caregiver understanding: Kamini expresses good understanding of discharge instructions   Frequency of Care Coordination:  (CC will follow up when discharged from home care )  Upcoming appointment: 11/15/17     Plan:   CC will request Dr Orr advice on an earlier appointment . Appointment is 11/15/2017  No unmet needs, no further care coordination is needed at this time.  CC gave contact information for any questions or concerns     Linda Blackman RN / Clinical Care Coordinator     [address] [address]  [email or web address]  Office :  [phone] / Fax :   [phone]

## 2017-11-13 DIAGNOSIS — I10 BENIGN ESSENTIAL HYPERTENSION: ICD-10-CM

## 2017-11-13 DIAGNOSIS — E22.2 SIADH (SYNDROME OF INAPPROPRIATE ADH PRODUCTION) (H): ICD-10-CM

## 2017-11-13 LAB
ANION GAP SERPL CALCULATED.3IONS-SCNC: 7 MMOL/L (ref 3–14)
BASOPHILS # BLD AUTO: 0.1 10E9/L (ref 0–0.2)
BASOPHILS NFR BLD AUTO: 0.9 %
BUN SERPL-MCNC: 7 MG/DL (ref 7–30)
CALCIUM SERPL-MCNC: 9.4 MG/DL (ref 8.5–10.1)
CHLORIDE SERPL-SCNC: 93 MMOL/L (ref 94–109)
CO2 SERPL-SCNC: 28 MMOL/L (ref 20–32)
CREAT SERPL-MCNC: 0.56 MG/DL (ref 0.52–1.04)
DIFFERENTIAL METHOD BLD: ABNORMAL
EOSINOPHIL # BLD AUTO: 0.5 10E9/L (ref 0–0.7)
EOSINOPHIL NFR BLD AUTO: 4.3 %
ERYTHROCYTE [DISTWIDTH] IN BLOOD BY AUTOMATED COUNT: 12.6 % (ref 10–15)
GFR SERPL CREATININE-BSD FRML MDRD: >90 ML/MIN/1.7M2
GLUCOSE SERPL-MCNC: 101 MG/DL (ref 70–99)
HCT VFR BLD AUTO: 42.6 % (ref 35–47)
HGB BLD-MCNC: 13.9 G/DL (ref 11.7–15.7)
LYMPHOCYTES # BLD AUTO: 1.4 10E9/L (ref 0.8–5.3)
LYMPHOCYTES NFR BLD AUTO: 13.1 %
MCH RBC QN AUTO: 29.9 PG (ref 26.5–33)
MCHC RBC AUTO-ENTMCNC: 32.6 G/DL (ref 31.5–36.5)
MCV RBC AUTO: 92 FL (ref 78–100)
MONOCYTES # BLD AUTO: 1.5 10E9/L (ref 0–1.3)
MONOCYTES NFR BLD AUTO: 14.5 %
NEUTROPHILS # BLD AUTO: 7 10E9/L (ref 1.6–8.3)
NEUTROPHILS NFR BLD AUTO: 67.2 %
PLATELET # BLD AUTO: 409 10E9/L (ref 150–450)
POTASSIUM SERPL-SCNC: 4.3 MMOL/L (ref 3.4–5.3)
RBC # BLD AUTO: 4.65 10E12/L (ref 3.8–5.2)
SODIUM SERPL-SCNC: 128 MMOL/L (ref 133–144)
WBC # BLD AUTO: 10.4 10E9/L (ref 4–11)

## 2017-11-13 PROCEDURE — 36415 COLL VENOUS BLD VENIPUNCTURE: CPT | Performed by: INTERNAL MEDICINE

## 2017-11-13 PROCEDURE — 80048 BASIC METABOLIC PNL TOTAL CA: CPT | Performed by: INTERNAL MEDICINE

## 2017-11-13 PROCEDURE — 85025 COMPLETE CBC W/AUTO DIFF WBC: CPT | Performed by: INTERNAL MEDICINE

## 2017-11-15 ENCOUNTER — OFFICE VISIT (OUTPATIENT)
Dept: INTERNAL MEDICINE | Facility: CLINIC | Age: 82
End: 2017-11-15
Payer: MEDICARE

## 2017-11-15 VITALS
TEMPERATURE: 97.9 F | OXYGEN SATURATION: 95 % | HEART RATE: 68 BPM | WEIGHT: 117.2 LBS | BODY MASS INDEX: 22.89 KG/M2 | DIASTOLIC BLOOD PRESSURE: 72 MMHG | SYSTOLIC BLOOD PRESSURE: 136 MMHG

## 2017-11-15 DIAGNOSIS — E78.5 HYPERLIPIDEMIA LDL GOAL <100: ICD-10-CM

## 2017-11-15 DIAGNOSIS — Z00.00 MEDICARE ANNUAL WELLNESS VISIT, SUBSEQUENT: Primary | ICD-10-CM

## 2017-11-15 DIAGNOSIS — M06.9 RHEUMATOID ARTHRITIS INVOLVING MULTIPLE SITES, UNSPECIFIED RHEUMATOID FACTOR PRESENCE: ICD-10-CM

## 2017-11-15 DIAGNOSIS — I10 BENIGN ESSENTIAL HYPERTENSION: ICD-10-CM

## 2017-11-15 DIAGNOSIS — E22.2 SIADH (SYNDROME OF INAPPROPRIATE ADH PRODUCTION) (H): ICD-10-CM

## 2017-11-15 DIAGNOSIS — I69.920 APHASIA, LATE EFFECT OF CEREBROVASCULAR DISEASE: ICD-10-CM

## 2017-11-15 DIAGNOSIS — I69.959 HEMIPLEGIA OF DOMINANT SIDE AS LATE EFFECT FOLLOWING CEREBROVASCULAR DISEASE (H): ICD-10-CM

## 2017-11-15 PROCEDURE — G0439 PPPS, SUBSEQ VISIT: HCPCS | Performed by: INTERNAL MEDICINE

## 2017-11-15 NOTE — NURSING NOTE
Chief Complaint   Patient presents with     Hypertension     6 month check     Lipids     6 month check       Initial /70  Pulse 68  Temp 97.9  F (36.6  C) (Oral)  Wt 117 lb 3.2 oz (53.2 kg)  SpO2 95%  BMI 22.89 kg/m2 Estimated body mass index is 22.89 kg/(m^2) as calculated from the following:    Height as of 10/18/17: 5' (1.524 m).    Weight as of this encounter: 117 lb 3.2 oz (53.2 kg).  Medication Reconciliation: complete   Dena Donahue, CMA

## 2017-11-15 NOTE — PROGRESS NOTES
SUBJECTIVE:   Steffany Brown is a 87 year old female who presents for Preventive Visit.      Are you in the first 12 months of your Medicare Part B coverage?      Healthy Habits:    Do you get at least three servings of calcium containing foods daily (dairy, green leafy vegetables, etc.)? yes    Amount of exercise or daily activities, outside of work:     Problems taking medications regularly No    Medication side effects: No    Have you had an eye exam in the past two years? yes    Do you see a dentist twice per year? yes    Do you have sleep apnea, excessive snoring or daytime drowsiness?no    COGNITIVE SCREEN  1) Repeat 3 items (Banana, Sunrise, Chair)    2) Clock draw: ABNORMAL shaky writing due to rheumatoid arthritis and hemiplegia.   3) 3 item recall:   Results:     Mini-CogTM Copyright SUELLEN Serrato. Licensed by the author for use in [facility]; reprinted with permission ([email or web address]). All rights reserved.                  Reviewed and updated as needed this visit by clinical staffTobacco  Allergies         Reviewed and updated as needed this visit by Provider  Tobacco        Social History   Substance Use Topics     Smoking status: Never Smoker     Smokeless tobacco: Never Used      Comment: pt quit smoking in 1948     Alcohol use Yes      Comment: rare       Hyperlipidemia Follow-Up    Rate your low fat/cholesterol diet?: fair    Taking statin?  Yes, no muscle aches from statin    Other lipid medications/supplements?:  None    Has history of hyperlipidemia.  On statin for this, denies any significant side effects of this medication.      Latest labs reviewed:    Recent Labs   Lab Test  05/24/17   0744  05/24/16   0738  05/12/15   0742  05/19/14   0741   CHOL  150  155  153  161   HDL  56  60  60  65   LDL  79  78  71  81   TRIG  76  86  111  77   CHOLHDLRATIO   --    --   2.6  2.5        Lab Results   Component Value Date    AST 16 10/18/2017         Hypertension Follow-up    Outpatient  blood pressures are not being checked.    Low Salt Diet: no added salt    Blood presure remains well controlled at home  Readings outside clinic are within normal limits.  Reviewed last 6 BP readings in chart:  BP Readings from Last 6 Encounters:   11/15/17 136/72   10/18/17 159/76   08/09/17 148/72   08/07/17 126/74   05/26/17 134/76   05/09/17 118/60     He has not experienced any significant side effects from medicaiotns for hypertension.    NO active cardiac complaints or symptoms with exercise.       Amount of exercise or physical activity: None    Problems taking medications regularly: No    Medication side effects: none  Diet: low salt and low fat/cholesterol    3.  [facility] reviewed.     LLQ abd pain has mostly resovled, occurs on occasion.  \no fevers, no chills   BMs are every 1-2 days, formed, not hard to pass.     4.  No change sin her neurological status, still with hemiplegia and aphasia since stroke.   Walks with walker, very slow, slightly unstable gait.         The patient does not drink >3 drinks per day nor >7 drinks per week.     Today's PHQ-2 Score:   PHQ-2 ( 1999 [employer]) 8/9/2017 5/26/2017   Q1: Little interest or pleasure in doing things 0 0   Q2: Feeling down, depressed or hopeless 0 0   PHQ-2 Score 0 0       Do you feel safe in your environment - Yes    Do you have a Health Care Directive?: Yes: Advance Directive has been received and scanned.    Current providers sharing in care for this patient include:   Patient Care Team:  Leo Hancock MD as PCP - General      Hearing impairment: No    Ability to successfully perform activities of daily living: No, needs assistance with: transportation, shopping, preparing meals, housework and bathing     Fall risk:         Home safety:  none identified      The following health maintenance items are reviewed in Epic and correct as of today:Health Maintenance   Topic Date Due      MEDICARE ANNUAL WELLNESS VISIT  2017     FALL RISK ASSESSMENT  2018     BMP Q6 MOS  2018     LIPID MONITORING Q1 YEAR  2018     COLONOSCOPY Q5 YR  2018     ALT Q1 YR  10/18/2018     ADVANCE DIRECTIVE PLANNING Q5 YRS  2021     TETANUS Q10 YR  2022     INFLUENZA VACCINE (SYSTEM ASSIGNED)  Completed     PNEUMOCOCCAL  Completed         Past Medical History:  ---------------------------  Past Medical History:   Diagnosis Date     Age-related macular degeneration, dry, both eyes 10/24/14    per [facility]reoretinal specialists     Diverticulosis of colon (without mention of hemorrhage)     Several diverticuli seen on colonoscopy     Eczema      Family history of colon cancer     mother  from colon cancer age 89     Hip fracture, intertrochanteric (H) 9/10/09    left hip fracture, S/P ORIF     History of small bowel obstruction ,      Osteoporosis, unspecified      Other and unspecified hyperlipidemia      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     Other speech disturbance 3/05    chronic dysarthria since CVI     Pelvic mass in female 10/3/11    s/p LEODAN/BSO; pathology:  Serous cystadenofibroma, 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     Personal history of colonic polyps      Psoriasis      Rheumatoid arthritis(714.0)      Unspecified essential hypertension        Past Surgical History:  ---------------------------  Past Surgical History:   Procedure Laterality Date     C APPENDECTOMY       COLONOSCOPY  03    Diverticulosis, single small polyp removed (at [facility])     COLONOSCOPY  08    diverticulosis, few scattered mild angioectasias, no polyps     COLONOSCOPY  2013    Procedure: COLONOSCOPY;  COLONOSCOPY ;  Surgeon: Romario Watters MD;  Location:  GI     [facility]       FRACTURE TX, HIP RT/LT  9/10/09    Open reduction with IM nailing of left hip fracture using a  gamma nail     HC COLONOSCOPY THRU STOMA W BIOPSY/CAUTERY TUMOR/POLYP/LESION  3/00    colonoscopy adenamatous polyp ([facility])     HYSTERECTOMY, LEODAN  10/03/11    s/p LEODAN/BSO to remove pelvic mass; pathology:  Serous cystadenofibroma, 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     OPEN REDUCTION INTERNAL FIXATION ANKLE  7/11/2014    Procedure: OPEN REDUCTION INTERNAL FIXATION ANKLE;  Surgeon: Emmanuel Gomez MD;  Location: [facility] OR     OPEN REDUCTION INTERNAL FIXATION HIP NAILING Right 7/20/2016    Procedure: OPEN REDUCTION INTERNAL FIXATION HIP NAILING;  Surgeon: Jeronimo Giordano MD;  Location: [facility] OR     RECTAL SURGERY  1971    anterior laproscopic repair of sigmoid prolapse     SMALL INTESTINE SURGERY       STRESS ECHO (METRO)  4/98    stress echo (negative)     SURGICAL HISTORY OF -   3/12/05    attempted angioplasty/stent of right middle cerebral artery ( M1 segment), no effective       Current Medications:  ---------------------------  Current Outpatient Prescriptions   Medication Sig Dispense Refill     amoxicillin (AMOXIL) 500 MG capsule TAKE 4 CAPSULES BY MOUTH FOR 1 TIME DOSE 1 HOUR PRIOR TO DENTAL PROCEDURE 4 capsule 2     clopidogrel (PLAVIX) 75 MG tablet Take 1 tablet (75 mg) by mouth daily 90 tablet 3     lovastatin (MEVACOR) 40 MG tablet Take 1 tablet (40 mg) by mouth At Bedtime 90 tablet 3     atenolol (TENORMIN) 50 MG tablet Take 1 tablet (50 mg) by mouth daily 90 tablet 3     fluticasone (FLONASE) 50 MCG/ACT spray Spray 2 sprays into both nostrils daily as needed for rhinitis 16 g 5     acetaminophen (TYLENOL) 500 MG tablet Take 500-1,000 mg by mouth every 6 hours as needed        cholecalciferol 2000 UNITS CAPS Take 2 capsules by mouth 2 times daily        Multiple Vitamins-Minerals (PRESERVISION AREDS) CAPS Take 1 capsule by mouth 2 times daily        loratadine (CLARITIN) 10 MG tablet Take 10 mg by mouth daily       augmented betamethasone dipropionate (DIPROLENE-AF) 0.05 % cream Apply sparingly once or  twice per day as needed to affected area until the skin is better, then stop; REPEAT AS NEEDED (Patient not taking: Reported on 11/15/2017) 50 g 1       Allergies:  -------------  Allergies   Allergen Reactions     Atorvastatin Calcium Cramps     Leg cramps     Lisinopril Swelling and Rash       Social History:  -------------------  Social History     Social History     Marital status: Single     Spouse name: N/A     Number of children: 0     Years of education: N/A     Occupational History     [employer] Other     [employer]     Social History Main Topics     Smoking status: Never Smoker     Smokeless tobacco: Never Used      Comment: pt quit smoking in 1948     Alcohol use Yes      Comment: rare     Drug use: No     Sexual activity: No     Other Topics Concern     Not on file     Social History Narrative    Living arrangements - the patient lives with her friend, Kamini       Family Medical History:  ------------------------------  Family History   Problem Relation Age of Onset     CANCER Mother       of colon ca age 89     Cancer - colorectal Mother      Cardiovascular Father       at age 65     CANCER Brother       of lung ca age 65          ROS:  REVIEW OF SYSTEMS:    RESP: negative for cough, dyspnea, wheezing, hemoptysis  CV: negative for chest pain, palpitations, PND, FENG, orthopnea; reports no changes in their ability to perform physical activity (from cardiovascular standpoint)  GI: negative for dysphagia, N/V, pain, melena, diarrhea and constipation  NEURO: negative for numbness/tingling; POS for  incoordination, hemiplegia, and aphasia chrincally since stroke.     OBJECTIVE:                                                    /72  Pulse 68  Temp 97.9  F (36.6  C) (Oral)  Wt 117 lb 3.2 oz (53.2 kg)  SpO2 95%  BMI 22.89 kg/m2     GENERAL alert and no distress  EYES:  Normal sclera,conjunctiva, EOMI  HENT: oral and posterior pharynx without lesions or erythema, facies  symmetric  NECK: Neck supple. No LAD, without thyroidmegaly or JVD., Carotids without bruits.  RESP: Clear to ausculation bilaterally without wheezes or crackles. Normal BS in all fields.  CV: RRR normal S1S2 without murmurs, rubs or gallops. PMI normal  LYMPH: no cervical lymph adenopathy appreciated  MS: extremities- no gross deformities of the visible extremities noted, no edema  PSYCH: Alert and oriented times 3; speech- chronic dysarthric and aphasia  SKIN:  No obvious significant skin lesions on visible portions of face                ASSESSMENT / PLAN:     (Z00.00) Medicare annual wellness visit, subsequent  (primary encounter diagnosis)  Comment: Discussed cardiac disease risk factors and cardiac disease risk factor modification, including diabetes screening, blood pressure screening (and management if indicated), and cholesterol screening.   Reviewed immunzation guidelines, including pneumococcal vaccines, annual influenza, and shingles vaccines.   Discussed routine cancer screenings, including skin cancer, colon cancer screening for everyone until age 80, prostate cancer screening in men until age 75, mammogram and PAP/pelvic for women until age 75.   Recommended regular dentist visits to care for remaining teeth.   Recommended regular screening for vision and glaucoma.   Recommended safe driving and accident avoidance.   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  Continue current therapy.   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   Plan: Lipid panel reflex to direct LDL Fasting,         Comprehensive metabolic panel, CBC with         platelets            (I69.920) Aphasia, late effect of cerebrovascular disease  Comment: This condition is currently controlled on the current  medical regimen.  Continue current therapy.   Plan:     (E78.5) Hyperlipidemia LDL goal <100  Comment: Discussed new guidelines recommending a statin cholesterol lowering medication for any patient with either diabetes and/or vascular disease, aiming for a LDL goal under 100 for sure, ideally under 70.    Reviewed statins and their side effects including muscle pain, muscle inflammation, GI upset.  Told the patient to stop the medication in question and to call if any side effects develop.   Recommended CoQ10 200-300 mg at the same time as taking the statin medication to help reduce the chance of muscle side effects from the statin.    Plan: Lipid panel reflex to direct LDL Fasting,         Comprehensive metabolic panel            (M06.9) Rheumatoid arthritis involving multiple sites, unspecified rheumatoid factor presence (H)  Comment: This condition is currently controlled on the current medical regimen.  Continue current therapy.   Plan:     (I10) Benign essential hypertension  Comment: This condition is currently controlled on the current medical regimen.  Continue current therapy.   Discussed hypertension in detail including JNC VIII guidelines for blood pressure goals.  Discussed indication for treatment and treatment options.  Discussed the importance for aggressive management of HTN to prevent vascular complications later.  Recommended lower fat, lower carbohydrate, and lower sodium (<2000 mg)diet.  Discussed required intervals for follow up on HTN, lab studies, and the need to aggresive management of other cardiac disease risk factors.  Recommened pt. follow their blood pressures outside the clinic to ensure that BPs are remaining within guidelines, and to contact me if the readings are not within guidelines so we can adjust treatment as needed.   Plan: Comprehensive metabolic panel, CBC with         platelets               End of Life Planning:  Patient currently has an advanced directive: Yes.   Practitioner is supportive of decision.    COUNSELING:  Reviewed preventive health counseling, as reflected in patient instructions       Regular exercise       Healthy diet/nutrition       Vision screening       Hearing screening       Dental care       Aspirin Prophylaxsis        Estimated body mass index is 22.89 kg/(m^2) as calculated from the following:    Height as of 10/18/17: 5' (1.524 m).    Weight as of this encounter: 117 lb 3.2 oz (53.2 kg).     reports that she has never smoked. She has never used smokeless tobacco.        Appropriate preventive services were discussed with this patient, including applicable screening as appropriate for cardiovascular disease, diabetes, osteopenia/osteoporosis, and glaucoma.  As appropriate for age/gender, discussed screening for colorectal cancer, prostate cancer, breast cancer, and cervical cancer. Checklist reviewing preventive services available has been given to the patient.    Reviewed patients plan of care and provided an AVS. The Basic Care Plan (routine screening as documented in Health Maintenance) for Steffany meets the Care Plan requirement. This Care Plan has been established and reviewed with the Patient and caregiver/'partner Kamini.    Counseling Resources:  ATP IV Guidelines  Pooled Cohorts Equation Calculator  Breast Cancer Risk Calculator  FRAX Risk Assessment  ICSI Preventive Guidelines  Dietary Guidelines for Americans, 2010  [employer] MyPlate  ASA Prophylaxis  Lung CA Screening    Leo Hancock MD  [facility]

## 2017-11-15 NOTE — MR AVS SNAPSHOT
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  Contact your usual pharmacy if you need refills.    *  Return to see me in 6 months, sooner if needed.  Please get fasting labs done at the [facility] or any other [facility] lab 1-2 days before this appointment.  If you get the labs done at another clinic, make arrangements with them directly.  The orders will be in place.  Eat nothing for at least 8 hours prior to having these labs drawn.  Call [phone] to schedule appointments at [facility].       5 GOALS TO PREVENT VASCULAR DISEASE:     1.  Aggressive blood pressure control, under 130/80 ideally.  Using medications if needed.    Your blood pressure is under good control    BP Readings from Last 4 Encounters:   11/15/17 136/72   10/18/17 159/76   08/09/17 148/72   08/07/17 126/74       2.  Aggressive LDL cholesterol (\"bad cholesterol\") lowering as indicated.    Your goal is an LDL under 130 for sure, preferably under 100.  (If you have diabetes or previous vascular disease, the the LDL goals would be under 100 for sure, preferably under 70.)    New guidelines identify four high-risk groups who could benefit from statins: " "  *people with pre-existing heart disease, such as those who have had a heart attack;   *people ages 40 to 75 who have diabetes of any type  *patients ages 40 to 75 with at least a 7.5% risk of developing cardiovascular disease over the next decade, according to a formula described in the guidelines  *patients with the sort of super-high cholesterol that sometimes runs in families, as evidenced by an LDL of 190 milligrams per deciliter or higher    Your cholesterol levels are well controlled.    Recent Labs   Lab Test  05/24/17   0744  05/24/16   0738  05/12/15   0742  05/19/14   0741   CHOL  150  155  153  161   HDL  56  60  60  65   LDL  79  78  71  81   TRIG  76  86  111  77   CHOLHDLRATIO   --    --   2.6  2.5       3.  Aggressive diabetic prevention, screening and/or management.      You do not have diabetes as of the most recent blood tests.     4.  No smoking    5.  Consider taking low dose aspirin (81 mg) tablet once per day over the age of 50, every day unless there is a specific reason that you cannot take aspirin (such as side effect, allergy, or you are on another \"blood thinner\").        --Based on your current cardiac risk factors, you should take Aspirin 81 mg once per day if you are over 50 years of age.           Preventive Health Recommendations    Female Ages 65 +    Yearly exam:     See your health care provider every year in order to  o Review health changes.   o Discuss preventive care.    o Review your medicines if your doctor has prescribed any.      You no longer need a yearly Pap test unless you've had an abnormal Pap test in the past 10 years. If you have vaginal symptoms, such as bleeding or discharge, be sure to talk with your provider about a Pap test.      Every 1 to 2 years, have a mammogram.  If you are over 69, talk with your health care provider about whether or not you want to continue having screening mammograms.      Every 10 years, have a colonoscopy. Or, have a yearly FIT test " "(stool test). These exams will check for colon cancer.       Have a cholesterol test every 5 years, or more often if your doctor advises it.       Have a diabetes test (fasting glucose) every three years. If you are at risk for diabetes, you should have this test more often.       At age 65, have a bone density scan (DEXA) to check for osteoporosis (brittle bone disease).    Shots:    Get a flu shot each year.    Get a tetanus shot every 10 years.    Talk to your doctor about your pneumonia vaccines. There are now two you should receive - Pneumovax (PPSV 23) and Prevnar (PCV 13).    Talk to your doctor about the shingles vaccine.    Talk to your doctor about the hepatitis B vaccine.    Nutrition:     Eat at least 5 servings of fruits and vegetables each day.      Eat whole-grain bread, whole-wheat pasta and brown rice instead of white grains and rice.      Talk to your provider about Calcium and Vitamin D.        --Good Grains:  Oats, brown rice, Quinoa (these do not raise the blood sugar as much)     --Bad grains:  Anything made from wheat or white rice     (because these raise the blood sugars significantly, and the possible gluten issue from wheat for some people).      --Proteins:  Aim for \"lean proteins\" including chicken, fish, seafood, pork, turkey, and eggs (in moderation); Eat red meat only occasionally        Lifestyle    Exercise at least 150 minutes a week (30 minutes a day, 5 days a week). This will help you control your weight and prevent disease.      Limit alcohol to one drink per day.      No smoking.       Wear sunscreen to prevent skin cancer.       See your dentist twice a year for an exam and cleaning.      See your eye doctor every 1 to 2 years to screen for conditions such as glaucoma, macular degeneration and cataracts.          Follow-ups after your visit        Future tests that were ordered for you today     Open Future Orders        Priority Expected Expires Ordered    Comprehensive " metabolic panel Routine 5/14/2018 6/13/2018 11/15/2017    CBC with platelets Routine 5/14/2018 6/13/2018 11/15/2017    Lipid panel reflex to direct LDL Fasting Routine 5/14/2018 6/13/2018 11/15/2017            Who to contact     If you have questions or need follow up information about today's clinic visit or your schedule please contact [facility] directly at [phone].  Normal or non-critical lab and imaging results will be communicated to you by Kal[employer], letter or phone within 4 business days after the clinic has received the results. If you do not hear from us within 7 days, please contact the clinic through [employer] or phone. If you have a critical or abnormal lab result, we will notify you by phone as soon as possible.  Submit refill requests through [employer] or call your pharmacy and they will forward the refill request to us. Please allow 3 business days for your refill to be completed.          Additional Information About Your Visit        [employer] Information     [employer] gives you secure access to your electronic health record. If you see a primary care provider, you can also send messages to your care team and make appointments. If you have questions, please call your primary care clinic.  If you do not have a primary care provider, please call [phone] and they will assist you.        Care EveryWhere ID     This is your Care EveryWhere ID. This could be used by other organizations to access your [facility] medical records  [id number]        Your Vitals Were     Pulse Temperature Pulse Oximetry BMI (Body Mass Index)          68 97.9  F (36.6  C) (Oral) 95% 22.89 kg/m2         Blood Pressure from Last 3 Encounters:   11/15/17 136/72   10/18/17 159/76   08/09/17 148/72    Weight from Last 3 Encounters:   11/15/17 117 lb 3.2 oz (53.2 kg)   10/18/17 118 lb (53.5 kg)   08/09/17 118 lb 3.2 oz (53.6 kg)               Primary Care Provider Office Phone # Fax #    Leo Brooks  MD S[place] [phone]       [address] [facility] [address]        Equal Access to Services     LISA BARRERA : Hadii aad ku hadlynn Chirinos, radha samileonardoha, cris kandacejayy rolon, oanh dunlap collinbilly reddy laarnulfodusty gleason. [facility] [phone].    ATENCIÓN: Si habla español, tiene a garcia disposición servicios gratuitos de asistencia lingüística. Llame al [phone].    We comply with applicable federal civil rights laws and [place] laws. We do not discriminate on the basis of race, color, national origin, age, disability, sex, sexual orientation, or gender identity.            Thank you!     Thank you for choosing [facility]  for your care. Our goal is always to provide you with excellent care. Hearing back from our patients is one way we can continue to improve our services. Please take a few minutes to complete the written survey that you may receive in the mail after your visit with us. Thank you!             Your Updated Medication List - Protect others around you: Learn how to safely use, store and throw away your medicines at www.disposemymeds.org.          This list is accurate as of: 11/15/17  8:09 AM.  Always use your most recent med list.                   Brand Name Dispense Instructions for use Diagnosis    acetaminophen 500 MG tablet    TYLENOL     Take 500-1,000 mg by mouth every 6 hours as needed        amoxicillin 500 MG capsule    AMOXIL    4 capsule    TAKE 4 CAPSULES BY MOUTH FOR 1 TIME DOSE 1 HOUR PRIOR TO DENTAL PROCEDURE    Prophylactic antibiotic       atenolol 50 MG tablet    TENORMIN    90 tablet    Take 1 tablet (50 mg) by mouth daily    Benign essential hypertension       augmented betamethasone dipropionate 0.05 % cream    DIPROLENE-AF    50 g    Apply sparingly once or twice per day as needed to affected area until the skin is better, then stop; REPEAT AS NEEDED    Dermatitis       cholecalciferol 2000 UNITS Caps      Take 2 capsules by  mouth 2 times daily        clopidogrel 75 MG tablet    PLAVIX    90 tablet    Take 1 tablet (75 mg) by mouth daily    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

## 2017-11-15 NOTE — PATIENT INSTRUCTIONS
"*  Continue all medications at the same doses.  Contact your usual pharmacy if you need refills.    *  Return to see me in 6 months, sooner if needed.  Please get fasting labs done at the [facility] or any other [facility] lab 1-2 days before this appointment.  If you get the labs done at another clinic, make arrangements with them directly.  The orders will be in place.  Eat nothing for at least 8 hours prior to having these labs drawn.  Call [phone] to schedule appointments at [facility].       5 GOALS TO PREVENT VASCULAR DISEASE:     1.  Aggressive blood pressure control, under 130/80 ideally.  Using medications if needed.    Your blood pressure is under good control    BP Readings from Last 4 Encounters:   11/15/17 136/72   10/18/17 159/76   08/09/17 148/72   08/07/17 126/74       2.  Aggressive LDL cholesterol (\"bad cholesterol\") lowering as indicated.    Your goal is an LDL under 130 for sure, preferably under 100.  (If you have diabetes or previous vascular disease, the the LDL goals would be under 100 for sure, preferably under 70.)    New guidelines identify four high-risk groups who could benefit from statins:   *people with pre-existing heart disease, such as those who have had a heart attack;   *people ages 40 to 75 who have diabetes of any type  *patients ages 40 to 75 with at least a 7.5% risk of developing cardiovascular disease over the next decade, according to a formula described in the guidelines  *patients with the sort of super-high cholesterol that sometimes runs in families, as evidenced by an LDL of 190 milligrams per deciliter or higher    Your cholesterol levels are well controlled.    Recent Labs   Lab Test  05/24/17   0744  05/24/16   0738  05/12/15   0742  05/19/14   0741   CHOL  150  155  153  161   HDL  56  60  60  65   LDL  79  78  71  81   TRIG  76  86  111  77   CHOLHDLRATIO   --    --   2.6  2.5       3.  Aggressive diabetic prevention, screening " "and/or management.      You do not have diabetes as of the most recent blood tests.     4.  No smoking    5.  Consider taking low dose aspirin (81 mg) tablet once per day over the age of 50, every day unless there is a specific reason that you cannot take aspirin (such as side effect, allergy, or you are on another \"blood thinner\").        --Based on your current cardiac risk factors, you should take Aspirin 81 mg once per day if you are over 50 years of age.           Preventive Health Recommendations    Female Ages 65 +    Yearly exam:     See your health care provider every year in order to  o Review health changes.   o Discuss preventive care.    o Review your medicines if your doctor has prescribed any.      You no longer need a yearly Pap test unless you've had an abnormal Pap test in the past 10 years. If you have vaginal symptoms, such as bleeding or discharge, be sure to talk with your provider about a Pap test.      Every 1 to 2 years, have a mammogram.  If you are over 69, talk with your health care provider about whether or not you want to continue having screening mammograms.      Every 10 years, have a colonoscopy. Or, have a yearly FIT test (stool test). These exams will check for colon cancer.       Have a cholesterol test every 5 years, or more often if your doctor advises it.       Have a diabetes test (fasting glucose) every three years. If you are at risk for diabetes, you should have this test more often.       At age 65, have a bone density scan (DEXA) to check for osteoporosis (brittle bone disease).    Shots:    Get a flu shot each year.    Get a tetanus shot every 10 years.    Talk to your doctor about your pneumonia vaccines. There are now two you should receive - Pneumovax (PPSV 23) and Prevnar (PCV 13).    Talk to your doctor about the shingles vaccine.    Talk to your doctor about the hepatitis B vaccine.    Nutrition:     Eat at least 5 servings of fruits and vegetables each " "day.      Eat whole-grain bread, whole-wheat pasta and brown rice instead of white grains and rice.      Talk to your provider about Calcium and Vitamin D.        --Good Grains:  Oats, brown rice, Quinoa (these do not raise the blood sugar as much)     --Bad grains:  Anything made from wheat or white rice     (because these raise the blood sugars significantly, and the possible gluten issue from wheat for some people).      --Proteins:  Aim for \"lean proteins\" including chicken, fish, seafood, pork, turkey, and eggs (in moderation); Eat red meat only occasionally        Lifestyle    Exercise at least 150 minutes a week (30 minutes a day, 5 days a week). This will help you control your weight and prevent disease.      Limit alcohol to one drink per day.      No smoking.       Wear sunscreen to prevent skin cancer.       See your dentist twice a year for an exam and cleaning.      See your eye doctor every 1 to 2 years to screen for conditions such as glaucoma, macular degeneration and cataracts.      Service Typically covered by medicare Recommended Completed   Vaccines    Pneumococcal     Once for patients after age 65       Influenza   Yearly       Hepatitis B           (if medium/high risk) Medium/high risk factors:    End Stage Kidney Disease    Hemophiliacs who received Factor XIII or IX concentrates    Clients of institutions for developmentally disabled    Persons who live in same house as a Hepatitis B carrier    Homosexual men    Illicit injectable drug users    Health care workers    Staff of institutions for developmentally disabled     Mammogram Covered: One-time screen between age 35-39, annually for age 40+     Pap and Pelvic Exam Covered: Annually if  high risk,  or childbearing age with abnormal Pap in last 3 years.  Q24 months for all other women     Prostate Cancer Screening    Digital rectal exam    PSA Covered: Annually for all men > age 50     Colorectal Cancer Screening Screening colonoscopy " every 10 years, more often for high risk patients     Diabetes self-management training Requires referral by treating physician for patient with diabetes     Diabetes screening    Fasting blood sugar or glucose tolerance test Once yearly, twice yearly if prediabetic     Cardiovascular screening blood tests    Total cholesterol    HDL    Triglycerides Every 5 years     Medical nutrition therapy for diabetes or renal disease Requires referral by treating physician for patient with diabetes or kidney disease     Glaucoma screening Annually for patients with one of the following risk factors:    Diabetes mellitus    Family history of glaucoma    -American age 50 and over    -American age 65 and over     Bone mass measurement Every 24 months if one of the following risk factors:    Estrogen deficiency    Vertebral abnormalities on x-ray indicative of osteoporosis, osteopenia, or vertebral fracture    Receiving/expected to receive the equivalent of at least 5 mg of Prednisone per day for > 3 months.    Hyperparathyroidism    Patient being monitored for response to osteoporosis therapy     One-time AAA screen  Must be ordered as part of Medicare IPPE   Any patient with a family history of AAA    Males Age 65-75, with history of smoking at least 100 cigarettes in lifetime     Smoking Cessation Counseling Beneficiaries who use tobacco are eligible to receive 2 cessation attempts per year; each attempt includes maximum of 4 sessions.     HIV Screening Annually for beneficiaries at increased risk:       Increased risk for HIV infection is defined in the  National Coverage Determinations (NCD) Manual,  Publication 100-03 Sections 190.14 (diagnostic) and 210.7 (screening). See http://www.cms.gov/manuals/downloads/oko081n2_Fwqp7.pdf and http://www.cms.gov/manuals/downloads/gov275g5_Mdum8.pdf on the Internet.  Three times per pregnancy for beneficiaries who are pregnant.       Future Annual Wellness Visit Annually,  for all beneficiaries.

## 2017-12-12 ENCOUNTER — OFFICE VISIT (OUTPATIENT)
Dept: URGENT CARE | Facility: URGENT CARE | Age: 82
End: 2017-12-12
Payer: MEDICARE

## 2017-12-12 VITALS
HEART RATE: 64 BPM | TEMPERATURE: 97.6 F | DIASTOLIC BLOOD PRESSURE: 100 MMHG | SYSTOLIC BLOOD PRESSURE: 160 MMHG | RESPIRATION RATE: 20 BRPM | BODY MASS INDEX: 22.85 KG/M2 | OXYGEN SATURATION: 96 % | WEIGHT: 117 LBS

## 2017-12-12 DIAGNOSIS — H61.23 BILATERAL IMPACTED CERUMEN: Primary | ICD-10-CM

## 2017-12-12 PROCEDURE — 99213 OFFICE O/P EST LOW 20 MIN: CPT | Performed by: PHYSICIAN ASSISTANT

## 2017-12-12 NOTE — NURSING NOTE
Chief Complaint   Patient presents with     Ear Problem     Pt's friend reports that pt needs her ears cleaned out.     Urgent Care       Initial BP (!) 160/100  Pulse 64  Temp 97.6  F (36.4  C)  Resp 20  Wt 117 lb (53.1 kg)  SpO2 96%  BMI 22.85 kg/m2 Estimated body mass index is 22.85 kg/(m^2) as calculated from the following:    Height as of 10/18/17: 5' (1.524 m).    Weight as of this encounter: 117 lb (53.1 kg).  Medication Reconciliation: complete

## 2017-12-12 NOTE — MR AVS SNAPSHOT
After Visit Summary   12/12/2017    Steffany Brown    MRN: [id number]           Patient Information     Date Of Birth          8/24/1930        Visit Information        Provider Department      12/12/2017 9:05 AM James Boucher PA-C [facility]        Today's Diagnoses     Bilateral impacted cerumen    -  1       Follow-ups after your visit        Who to contact     If you have questions or need follow up information about today's clinic visit or your schedule please contact [facility] directly at [phone].  Normal or non-critical lab and imaging results will be communicated to you by happin!hart, letter or phone within 4 business days after the clinic has received the results. If you do not hear from us within 7 days, please contact the clinic through happin!hart or phone. If you have a critical or abnormal lab result, we will notify you by phone as soon as possible.  Submit refill requests through ecomom or call your pharmacy and they will forward the refill request to us. Please allow 3 business days for your refill to be completed.          Additional Information About Your Visit        MyChart Information     ecomom gives you secure access to your electronic health record. If you see a primary care provider, you can also send messages to your care team and make appointments. If you have questions, please call your primary care clinic.  If you do not have a primary care provider, please call [phone] and they will assist you.        Care EveryWhere ID     This is your Care EveryWhere ID. This could be used by other organizations to access your [facility] medical records  [id number]        Your Vitals Were     Pulse Temperature Respirations Pulse Oximetry BMI (Body Mass Index)       64 97.6  F (36.4  C) 20 96% 22.85 kg/m2        Blood Pressure from Last 3 Encounters:   12/12/17 (!) 160/100   11/15/17 136/72   10/18/17 159/76    Weight  from Last 3 Encounters:   12/12/17 117 lb (53.1 kg)   11/15/17 117 lb 3.2 oz (53.2 kg)   10/18/17 118 lb (53.5 kg)              Today, you had the following     No orders found for display         Today's Medication Changes          These changes are accurate as of: 12/12/17  9:47 AM.  If you have any questions, ask your nurse or doctor.               Start taking these medicines.        Dose/Directions    carbamide peroxide 6.5 % otic solution   Commonly known as:  DEBROX   Used for:  Bilateral impacted cerumen   Started by:  James Boucher PA-C        Dose:  5-10 drop   Place 5-10 drops into both ears 2 times daily   Quantity:  30 mL   Refills:  0            Where to get your medicines      These medications were sent to [facility] [address] [address]     Phone:  [phone]     carbamide peroxide 6.5 % otic solution                Primary Care Provider Office Phone # Fax #    Leo Hancock [phone]       [address] [place] [id number]        Equal Access to Services     [facility] Hospital[facility]: Hadii aad ku hadasho Soomaali, waaxda luqadaha, qaybta kaalmada adeegyada, waxay chingin hayaan jaime plascencia ah. So [facility] [phone].    ATENCIÓN: Si habla español, tiene a garcia disposición servicios gratuitos de asistencia lingüística. Llame al [phone].    We comply with applicable federal civil rights laws and [place] laws. We do not discriminate on the basis of race, color, national origin, age, disability, sex, sexual orientation, or gender identity.            Thank you!     Thank you for choosing [facility]  for your care. Our goal is always to provide you with excellent care. Hearing back from our patients is one way we can continue to improve our services. Please take a few minutes to complete the written survey that you may receive in the mail after your visit with us. Thank  you!             Your Updated Medication List - Protect others around you: Learn how to safely use, store and throw away your medicines at www.disposemymeds.org.          This list is accurate as of: 12/12/17  9:47 AM.  Always use your most recent med list.                   Brand Name Dispense Instructions for use Diagnosis    acetaminophen 500 MG tablet    TYLENOL     Take 500-1,000 mg by mouth every 6 hours as needed        amoxicillin 500 MG capsule    AMOXIL    4 capsule    TAKE 4 CAPSULES BY MOUTH FOR 1 TIME DOSE 1 HOUR PRIOR TO DENTAL PROCEDURE    Prophylactic antibiotic       atenolol 50 MG tablet    TENORMIN    90 tablet    Take 1 tablet (50 mg) by mouth daily    Benign essential hypertension       carbamide peroxide 6.5 % otic solution    DEBROX    30 mL    Place 5-10 drops into both ears 2 times daily    Bilateral impacted cerumen       cholecalciferol 2000 UNITS Caps      Take 2 capsules by mouth 2 times daily        clopidogrel 75 MG tablet    PLAVIX    90 tablet    Take 1 tablet (75 mg) by mouth daily    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

## 2017-12-12 NOTE — PROGRESS NOTES
SUBJECTIVE:  Steffany Brown is a 87 year old female who presents with bilateral ear blockage for 1 week(s).   Severity: mild and moderate   Timing:still present  Additional symptoms include decreased hearing.      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     Other speech disturbance 3/05    chronic dysarthria since CVI     Pelvic mass in female 10/3/11    s/p LEODAN/BSO; pathology:  Serous cystadenofibroma, 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     Personal history of colonic polyps      Psoriasis      Rheumatoid arthritis(714.0)      Unspecified essential hypertension      Patient Active Problem List   Diagnosis     Rheumatoid arthritis (H)     Osteoporosis     Benign essential hypertension     History of colonic polyps     Family history of malignant neoplasm of gastrointestinal tract     Other generalized ischemic cerebrovascular disease     Other speech disturbance     Hyposmolality and/or hyponatremia     Pain in joint, shoulder region     Aftercare for healing traumatic fracture of upper arm     Aftercare for healing traumatic fracture of upper leg     Other postprocedural status(V45.89)     Psoriasis     Hyperlipidemia LDL goal <100     Advance Care Planning     Allergic rhinitis     Eczema     Family history of colon cancer     Trimalleolar fracture of ankle, closed      Aphasia, late effect of cerebrovascular disease     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)     Closed right ankle fracture     Physical deconditioning     Health Care Home     Age-related macular degeneration, dry, both eyes     Closed right hip fracture (H)     Anemia due to blood loss, acute     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)     Urinary retention     Right ankle pain     Social History   Substance Use Topics     Smoking status: Former Smoker     Quit date: 1/1/1948     Smokeless tobacco: Never Used     Alcohol use Yes      Comment: rare       ROS:   CONSTITUTIONAL:NEGATIVE for fever, chills, change in weight  INTEGUMENTARY/SKIN: NEGATIVE for worrisome rashes, moles or lesions  ENT/MOUTH: POSITIVE for ear wax and blockage  RESP:NEGATIVE for significant cough or SOB  CV: NEGATIVE for chest pain, palpitations or peripheral edema  NEURO: Positive for decreased hearing    OBJECTIVE:  BP (!) 160/100  Pulse 64  Temp 97.6  F (36.4  C)  Resp 20  Wt 117 lb (53.1 kg)  SpO2 96%  BMI 22.85 kg/m2   EXAM:  The right TM is normal: no effusions, no erythema, and normal landmarks and not visualized secondary to cerumen     The right auditory canal is obstructed with cerumen  The left TM is not visualized secondary to cerumen  The left auditory canal is obstructed with cerumen  Oropharynx exam is normal: no lesions, erythema, adenopathy or exudate.  GENERAL: no acute distress  EYES: EOMI,  PERRL, conjunctiva clear  NECK: supple, non-tender to palpation, no adenopathy noted  SKIN: no suspicious lesions or rashes     ASSESSMENT/PLAN      ICD-10-CM    1. Bilateral impacted cerumen H61.23 carbamide peroxide (DEBROX) 6.5 % otic solution      use debrox to prevent wax build up  Do not use at the same time with hearing aids  Follow up with ENT prn

## 2018-05-22 DIAGNOSIS — I10 BENIGN ESSENTIAL HYPERTENSION: ICD-10-CM

## 2018-05-22 DIAGNOSIS — E22.2 SIADH (SYNDROME OF INAPPROPRIATE ADH PRODUCTION) (H): ICD-10-CM

## 2018-05-22 DIAGNOSIS — E78.5 HYPERLIPIDEMIA LDL GOAL <100: ICD-10-CM

## 2018-05-22 DIAGNOSIS — I69.959 HEMIPLEGIA OF DOMINANT SIDE AS LATE EFFECT FOLLOWING CEREBROVASCULAR DISEASE (H): ICD-10-CM

## 2018-05-22 LAB
ALBUMIN SERPL-MCNC: 3.3 G/DL (ref 3.4–5)
ALP SERPL-CCNC: 98 U/L (ref 40–150)
ALT SERPL W P-5'-P-CCNC: 17 U/L (ref 0–50)
ANION GAP SERPL CALCULATED.3IONS-SCNC: 8 MMOL/L (ref 3–14)
AST SERPL W P-5'-P-CCNC: 16 U/L (ref 0–45)
BILIRUB SERPL-MCNC: 0.5 MG/DL (ref 0.2–1.3)
BUN SERPL-MCNC: 8 MG/DL (ref 7–30)
CALCIUM SERPL-MCNC: 9.3 MG/DL (ref 8.5–10.1)
CHLORIDE SERPL-SCNC: 95 MMOL/L (ref 94–109)
CHOLEST SERPL-MCNC: 128 MG/DL
CO2 SERPL-SCNC: 26 MMOL/L (ref 20–32)
CREAT SERPL-MCNC: 0.53 MG/DL (ref 0.52–1.04)
ERYTHROCYTE [DISTWIDTH] IN BLOOD BY AUTOMATED COUNT: 12.6 % (ref 10–15)
GFR SERPL CREATININE-BSD FRML MDRD: >90 ML/MIN/1.7M2
GLUCOSE SERPL-MCNC: 90 MG/DL (ref 70–99)
HCT VFR BLD AUTO: 40.6 % (ref 35–47)
HDLC SERPL-MCNC: 48 MG/DL
HGB BLD-MCNC: 13.2 G/DL (ref 11.7–15.7)
LDLC SERPL CALC-MCNC: 61 MG/DL
MCH RBC QN AUTO: 30 PG (ref 26.5–33)
MCHC RBC AUTO-ENTMCNC: 32.5 G/DL (ref 31.5–36.5)
MCV RBC AUTO: 92 FL (ref 78–100)
NONHDLC SERPL-MCNC: 80 MG/DL
PLATELET # BLD AUTO: 402 10E9/L (ref 150–450)
POTASSIUM SERPL-SCNC: 4.1 MMOL/L (ref 3.4–5.3)
PROT SERPL-MCNC: 7.9 G/DL (ref 6.8–8.8)
RBC # BLD AUTO: 4.4 10E12/L (ref 3.8–5.2)
SODIUM SERPL-SCNC: 129 MMOL/L (ref 133–144)
TRIGL SERPL-MCNC: 97 MG/DL
WBC # BLD AUTO: 10.5 10E9/L (ref 4–11)

## 2018-05-22 PROCEDURE — 36415 COLL VENOUS BLD VENIPUNCTURE: CPT | Performed by: INTERNAL MEDICINE

## 2018-05-22 PROCEDURE — 85027 COMPLETE CBC AUTOMATED: CPT | Performed by: INTERNAL MEDICINE

## 2018-05-22 PROCEDURE — 80053 COMPREHEN METABOLIC PANEL: CPT | Performed by: INTERNAL MEDICINE

## 2018-05-22 PROCEDURE — 80061 LIPID PANEL: CPT | Performed by: INTERNAL MEDICINE

## 2018-05-24 ENCOUNTER — OFFICE VISIT (OUTPATIENT)
Dept: INTERNAL MEDICINE | Facility: CLINIC | Age: 83
End: 2018-05-24
Payer: MEDICARE

## 2018-05-24 VITALS
SYSTOLIC BLOOD PRESSURE: 108 MMHG | BODY MASS INDEX: 24.04 KG/M2 | OXYGEN SATURATION: 92 % | HEART RATE: 62 BPM | TEMPERATURE: 97.8 F | WEIGHT: 123.1 LBS | RESPIRATION RATE: 10 BRPM | DIASTOLIC BLOOD PRESSURE: 68 MMHG

## 2018-05-24 DIAGNOSIS — E22.2 SIADH (SYNDROME OF INAPPROPRIATE ADH PRODUCTION) (H): ICD-10-CM

## 2018-05-24 DIAGNOSIS — I10 BENIGN ESSENTIAL HYPERTENSION: ICD-10-CM

## 2018-05-24 DIAGNOSIS — M67.431 GANGLION OF RIGHT WRIST: ICD-10-CM

## 2018-05-24 DIAGNOSIS — M06.9 RHEUMATOID ARTHRITIS INVOLVING MULTIPLE SITES, UNSPECIFIED RHEUMATOID FACTOR PRESENCE: ICD-10-CM

## 2018-05-24 DIAGNOSIS — I69.959 HEMIPLEGIA OF DOMINANT SIDE AS LATE EFFECT FOLLOWING CEREBROVASCULAR DISEASE (H): Primary | ICD-10-CM

## 2018-05-24 DIAGNOSIS — E78.5 HYPERLIPIDEMIA LDL GOAL <100: ICD-10-CM

## 2018-05-24 PROCEDURE — 99214 OFFICE O/P EST MOD 30 MIN: CPT | Performed by: INTERNAL MEDICINE

## 2018-05-24 RX ORDER — ATENOLOL 50 MG/1
50 TABLET ORAL DAILY
Qty: 90 TABLET | Refills: 3 | Status: SHIPPED | OUTPATIENT
Start: 2018-05-24 | End: 2019-06-21

## 2018-05-24 RX ORDER — LOVASTATIN 40 MG
40 TABLET ORAL AT BEDTIME
Qty: 90 TABLET | Refills: 3 | Status: SHIPPED | OUTPATIENT
Start: 2018-05-24 | End: 2019-06-02

## 2018-05-24 RX ORDER — CLOPIDOGREL BISULFATE 75 MG/1
75 TABLET ORAL DAILY
Qty: 90 TABLET | Refills: 3 | Status: SHIPPED | OUTPATIENT
Start: 2018-05-24 | End: 2019-06-21

## 2018-05-24 NOTE — PROGRESS NOTES
SUBJECTIVE:   Steffany Brown is a 87 year old female who presents to clinic today for the following health issues:    Would like her ears checked today    Hyperlipidemia Follow-Up      Rate your low fat/cholesterol diet?: good    Taking statin?  Yes, no muscle aches from statin    Other lipid medications/supplements?:  none    Hypertension Follow-up      Outpatient blood pressures are not being checked.    Low Salt Diet: low salt      Amount of exercise or physical activity: None    Problems taking medications regularly: No    Medication side effects: none    Diet: low salt and low fat/cholesterol            Problem list and histories reviewed & adjusted, as indicated.  Additional history: as documented        Reviewed and updated as needed this visit by clinical staff       Reviewed and updated as needed this visit by Provider           Past Medical History:  ---------------------------  Past Medical History:   Diagnosis Date     Age-related macular degeneration, dry, both eyes 10/24/14    per [facility]reoretinal specialists     Diverticulosis of colon (without mention of hemorrhage)     Several diverticuli seen on colonoscopy     Eczema      Family history of colon cancer     mother  from colon cancer age 89     Hip fracture, intertrochanteric (H) 9/10/09    left hip fracture, S/P ORIF     History of small bowel obstruction ,      Osteoporosis, unspecified      Other and unspecified hyperlipidemia      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     Other speech disturbance 3/05    chronic dysarthria since CVI     Pelvic mass in female 10/3/11    s/p LEODAN/BSO; pathology:  Serous cystadenofibroma, 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     Personal history of colonic polyps      Psoriasis      Rheumatoid arthritis(714.0)      Unspecified essential hypertension        Past Surgical History:  ---------------------------  Past Surgical  History:   Procedure Laterality Date     C APPENDECTOMY  1985     COLONOSCOPY  5/14/03    Diverticulosis, single small polyp removed (at [facility])     COLONOSCOPY  6/26/08    diverticulosis, few scattered mild angioectasias, no polyps     COLONOSCOPY  7/17/2013    Procedure: COLONOSCOPY;  COLONOSCOPY ;  Surgeon: Romario Watters MD;  Location:  GI     D [facility] C       FRACTURE TX, HIP RT/LT  9/10/09    Open reduction with IM nailing of left hip fracture using a gamma nail     HC COLONOSCOPY THRU STOMA W BIOPSY/CAUTERY TUMOR/POLYP/LESION  3/00    colonoscopy adenamatous polyp ([facility])     HYSTERECTOMY, LEODAN  10/03/11    s/p LEODAN/BSO to remove pelvic mass; pathology:  Serous cystadenofibroma, 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     OPEN REDUCTION INTERNAL FIXATION ANKLE  7/11/2014    Procedure: OPEN REDUCTION INTERNAL FIXATION ANKLE;  Surgeon: Emmanuel Gomez MD;  Location:  OR     OPEN REDUCTION INTERNAL FIXATION HIP NAILING Right 7/20/2016    Procedure: OPEN REDUCTION INTERNAL FIXATION HIP NAILING;  Surgeon: Jeronimo Giordano MD;  Location:  OR     RECTAL SURGERY  1971    anterior laproscopic repair of sigmoid prolapse     SMALL INTESTINE SURGERY       STRESS ECHO (METRO)  4/98    stress echo (negative)     SURGICAL HISTORY OF -   3/12/05    attempted angioplasty/stent of right middle cerebral artery ( M1 segment), no effective       Current Medications:  ---------------------------  Current Outpatient Prescriptions   Medication Sig Dispense Refill     acetaminophen (TYLENOL) 500 MG tablet Take 500-1,000 mg by mouth every 6 hours as needed        amoxicillin (AMOXIL) 500 MG capsule TAKE 4 CAPSULES BY MOUTH FOR 1 TIME DOSE 1 HOUR PRIOR TO DENTAL PROCEDURE 4 capsule 2     atenolol (TENORMIN) 50 MG tablet Take 1 tablet (50 mg) by mouth daily 90 tablet 3     carbamide peroxide (DEBROX) 6.5 % otic solution Place 5-10 drops into both ears 2 times daily 30 mL 0     cholecalciferol 2000 UNITS CAPS Take 2  capsules by mouth 2 times daily        clopidogrel (PLAVIX) 75 MG tablet Take 1 tablet (75 mg) by mouth daily 90 tablet 3     fluticasone (FLONASE) 50 MCG/ACT spray Spray 2 sprays into both nostrils daily as needed for rhinitis 16 g 5     loratadine (CLARITIN) 10 MG tablet Take 10 mg by mouth daily       lovastatin (MEVACOR) 40 MG tablet Take 1 tablet (40 mg) by mouth At Bedtime 90 tablet 3     Multiple Vitamins-Minerals (PRESERVISION AREDS) CAPS Take 1 capsule by mouth 2 times daily          Allergies:  -------------  Allergies   Allergen Reactions     Atorvastatin Calcium Cramps     Leg cramps     Lisinopril Swelling and Rash     Seasonal Allergies        Social History:  -------------------  Social History     Social History     Marital status: Single     Spouse name: N/A     Number of children: 0     Years of education: N/A     Occupational History      Other     [employer]     Social History Main Topics     Smoking status: Former Smoker     Quit date: 1948     Smokeless tobacco: Never Used     Alcohol use Yes      Comment: rare     Drug use: No     Sexual activity: No     Other Topics Concern     Not on file     Social History Narrative    Living arrangements - the patient lives with her friendKamini       Family Medical History:  ------------------------------  Family History   Problem Relation Age of Onset     CANCER Mother       of colon ca age 89     Cancer - colorectal Mother      Cardiovascular Father       at age 65     CANCER Brother       of lung ca age 65         ROS:  REVIEW OF SYSTEMS:    RESP: negative for cough, dyspnea, wheezing, hemoptysis  CV: negative for chest pain, palpitations, PND, FENG, orthopnea  GI: negative for dysphagia, N/V, pain, melena, diarrhea and constipation  NEURO: negative for numbness/tingling, paralysis, POS for right hemiplegia and incoordination since stroke    OBJECTIVE:                                                    BP  108/68  Pulse 62  Temp 97.8  F (36.6  C) (Oral)  Resp 10  Wt 123 lb 1.6 oz (55.8 kg)  SpO2 92%  BMI 24.04 kg/m2     GENERAL alert and no distress  EYES:  Normal sclera,conjunctiva, EOMI  HENT: oral and posterior pharynx without lesions or erythema, facies symmetric  NECK: Neck supple. No LAD, without thyroidmegaly or JVD., Carotids without bruits.  RESP: Clear to ausculation bilaterally without wheezes or crackles. Normal BS in all fields.  CV: RRR normal S1S2 without murmurs, rubs or gallops. 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  Continue current therapy.   Discussed secondary risk factor modification and recommended continuing aggressive management of these items.   Plan: clopidogrel (PLAVIX) 75 MG tablet            (I10) Benign essential hypertension  Comment: This condition is currently controlled on the current medical regimen.  Continue current therapy.  Discussed current hypertension treatment guidelines, including indications for treatment and treatment options.  Discussed the importance for aggressive management of HTN to prevent vascular complications later.  Recommended lower fat, lower carbohydrate, and lower sodium (<2000 mg)diet.  Discussed required intervals for follow up on HTN, lab studies.  Recommened pt. follow their blood pressures outside the clinic to  ensure that BPs are remaining within guidelines, and to contact me if the readings are not within guidelines on a regular basis so we can adjust treatment as needed.   Plan: atenolol (TENORMIN) 50 MG tablet, **Basic         metabolic panel FUTURE 6mo, Hemoglobin            (E78.5) Hyperlipidemia LDL goal <100  Comment: Discussed new guidelines recommending a statin cholesterol lowering medication for any patient with either diabetes and/or vascular disease, aiming for a LDL goal under 100 for sure, ideally under 70.    Reviewed statins and their side effects including muscle pain, muscle inflammation, GI upset.  Told the patient to stop the medication in question and to call if any side effects develop.   Recommended CoQ10 200-300 mg at the same time as taking the statin medication to help reduce the chance of muscle side effects from the statin.    Plan: lovastatin (MEVACOR) 40 MG tablet            (M67.431) Ganglion of right wrist  Comment: minor, no boterhing her at this time, but mentoined that they can grow.   Refer to hand ortho if needed.   Plan: ORTHOPEDICS ADULT REFERRAL            (M06.9) Rheumatoid arthritis involving multiple sites, unspecified rheumatoid factor presence (H)  Comment: This condition is currently controlled on the current medical regimen.  Continue current therapy.   Plan:     (E22.2) 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)  Comment: chronic since her stroke.   This condition is currently controlled on the current medical regimen.  Continue current therapy.   Plan: **Basic metabolic panel FUTURE 6mo               See Patient Instructions    TONY SINGH M.D., MD  [facility]

## 2018-05-24 NOTE — MR AVS SNAPSHOT
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  Contact your usual pharmacy if you need refills.     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  Please get nonfasting labs done in the [facility] any other [facility] lab 1-2 days before this appointment.  If you get the labs done at another clinic, make arrangements with that clinic directly.  The orders will be in place.  Call [phone] to schedule appointments at [facility].      5 GOALS TO PREVENT VASCULAR DISEASE:     1.  Aggressive blood pressure control, under 130/80 ideally.  Using medications if needed.    Your blood pressure is under good control    BP Readings from Last 4 Encounters:   05/24/18 108/68   12/12/17 (!) 160/100   11/15/17 136/72   10/18/17 159/76       2.  Aggressive LDL cholesterol (\"bad cholesterol\") lowering as indicated.    Your goal is an LDL under 130 for sure, preferably under 100.  (If you have diabetes or previous vascular disease, the the LDL goals would be under 100 for sure, preferably under " "70.)    New guidelines identify four high-risk groups who could benefit from statins:   *people with pre-existing heart disease, such as those who have had a heart attack;   *people ages 40 to 75 who have diabetes of any type  *patients ages 40 to 75 with at least a 7.5% risk of developing cardiovascular disease over the next decade, according to a formula described in the guidelines  *patients with the sort of super-high cholesterol that sometimes runs in families, as evidenced by an LDL of 190 milligrams per deciliter or higher    Your cholesterol levels are well controlled.    Recent Labs   Lab Test  05/22/18   0744  05/24/17   0744   05/12/15   0742  05/19/14   0741   CHOL  128  150   < >  153  161   HDL  48*  56   < >  60  65   LDL  61  79   < >  71  81   TRIG  97  76   < >  111  77   CHOLHDLRATIO   --    --    --   2.6  2.5    < > = values in this interval not displayed.       3.  Aggressive diabetic prevention, screening and/or management.      You do not have diabetes as of the most recent blood tests.     4.  No smoking    5.  Consider taking low dose aspirin (81 mg) tablet once per day over the age of 50, every day unless there is a specific reason that you cannot take aspirin (such as side effect, allergy, or you are on another \"blood thinner\").        --Based on your current cardiac risk factors, you should take Aspirin 81 mg once per day if you are over 50 years of age.                 Follow-ups after your visit        Additional Services     ORTHOPEDICS ADULT REFERRAL       Your provider has referred you to:     ---[facility] [place] [phone]   [email or web address]    Please be aware that coverage of these services is subject to the terms and limitations of your health insurance plan.  Call member services at your health plan with any benefit or coverage questions.      Please bring the following to your appointment:    >>   Any x-rays, CTs or MRIs which have been " performed.  Contact the facility where they were done to arrange for  prior to your scheduled appointment.    >>   List of current medications   >>   This referral request   >>   Any documents/labs given to you for this referral                  Future tests that were ordered for you today     Open Future Orders        Priority Expected Expires Ordered    **Basic metabolic panel FUTURE 6mo Routine 11/24/2018 1/24/2019 5/24/2018    Hemoglobin Routine 11/24/2018 1/24/2019 5/24/2018            Who to contact     If you have questions or need follow up information about today's clinic visit or your schedule please contact [facility] directly at [phone].  Normal or non-critical lab and imaging results will be communicated to you by MyChart, letter or phone within 4 business days after the clinic has received the results. If you do not hear from us within 7 days, please contact the clinic through [employer] or phone. If you have a critical or abnormal lab result, we will notify you by phone as soon as possible.  Submit refill requests through [employer] or call your pharmacy and they will forward the refill request to us. Please allow 3 business days for your refill to be completed.          Additional Information About Your Visit        MyChart Information     [employer] gives you secure access to your electronic health record. If you see a primary care provider, you can also send messages to your care team and make appointments. If you have questions, please call your primary care clinic.  If you do not have a primary care provider, please call [phone] and they will assist you.        Care EveryWhere ID     This is your Care EveryWhere ID. This could be used by other organizations to access your [facility] medical records  [id number]        Your Vitals Were     Pulse Temperature Respirations Pulse Oximetry BMI (Body Mass Index)       62 97.8  F (36.6  C) (Oral) 10 92% 24.04 kg/m2         Blood Pressure from Last 3 Encounters:   05/24/18 108/68   12/12/17 (!) 160/100   11/15/17 136/72    Weight from Last 3 Encounters:   05/24/18 123 lb 1.6 oz (55.8 kg)   12/12/17 117 lb (53.1 kg)   11/15/17 117 lb 3.2 oz (53.2 kg)              We Performed the Following     ORTHOPEDICS ADULT REFERRAL          Where to get your medicines      These medications were sent to [facility] [address]  [address] [place] [address]     Phone:  [phone]     atenolol 50 MG tablet    clopidogrel 75 MG tablet    lovastatin 40 MG tablet          Primary Care Provider Office Phone # Fax #    Leo Hancock [phone]       [address] [place] [address]        Equal Access to Services     [facility]: Hadii aad ku hadasho Soomaali, waaxda luqadaha, qaybta kaalmada adeegyada, waxay chingin hayaan jaime plascencia . So [facility] [phone].    ATENCIÓN: Si habla español, tiene a garcia disposición servicios gratuitos de asistencia lingüística. Llame al [phone].    We comply with applicable federal civil rights laws and [place] laws. We do not discriminate on the basis of race, color, national origin, age, disability, sex, sexual orientation, or gender identity.            Thank you!     Thank you for choosing [facility]  for your care. Our goal is always to provide you with excellent care. Hearing back from our patients is one way we can continue to improve our services. Please take a few minutes to complete the written survey that you may receive in the mail after your visit with us. Thank you!             Your Updated Medication List - Protect others around you: Learn how to safely use, store and throw away your medicines at www.disposemymeds.org.          This list is accurate as of 5/24/18  7:55 AM.  Always use your most recent med list.                   Brand Name Dispense Instructions for use Diagnosis     acetaminophen 500 MG tablet    TYLENOL     Take 500-1,000 mg by mouth every 6 hours as needed        amoxicillin 500 MG capsule    AMOXIL    4 capsule    TAKE 4 CAPSULES BY MOUTH FOR 1 TIME DOSE 1 HOUR PRIOR TO DENTAL PROCEDURE    Prophylactic antibiotic       atenolol 50 MG tablet    TENORMIN    90 tablet    Take 1 tablet (50 mg) by mouth daily    Benign essential hypertension       carbamide peroxide 6.5 % otic solution    DEBROX    30 mL    Place 5-10 drops into both ears 2 times daily    Bilateral impacted cerumen       cholecalciferol 2000 units Caps      Take 2 capsules by mouth 2 times daily        clopidogrel 75 MG tablet    PLAVIX    90 tablet    Take 1 tablet (75 mg) by mouth daily    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

## 2018-05-24 NOTE — PATIENT INSTRUCTIONS
"*  Continue all medications at the same doses.  Contact your usual pharmacy if you need refills.     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  Please get nonfasting labs done in the [facility] any other [facility] lab 1-2 days before this appointment.  If you get the labs done at another clinic, make arrangements with that clinic directly.  The orders will be in place.  Call [phone] to schedule appointments at [facility].      5 GOALS TO PREVENT VASCULAR DISEASE:     1.  Aggressive blood pressure control, under 130/80 ideally.  Using medications if needed.    Your blood pressure is under good control    BP Readings from Last 4 Encounters:   05/24/18 108/68   12/12/17 (!) 160/100   11/15/17 136/72   10/18/17 159/76       2.  Aggressive LDL cholesterol (\"bad cholesterol\") lowering as indicated.    Your goal is an LDL under 130 for sure, preferably under 100.  (If you have diabetes or previous vascular disease, the the LDL goals would be under 100 for sure, preferably under 70.)    New guidelines identify four high-risk groups who could benefit from statins:   *people with pre-existing heart disease, such as those who have had a heart attack;   *people ages 40 to 75 who have diabetes of any type  *patients ages 40 to 75 with at least a 7.5% risk of developing cardiovascular disease over the next decade, according to a formula described in the guidelines  *patients with the sort of super-high cholesterol that sometimes runs in families, as evidenced by an LDL of 190 milligrams per deciliter or higher    Your cholesterol levels are well controlled.    Recent Labs   Lab Test  05/22/18   0744  05/24/17   0744   05/12/15   0742  05/19/14   0741   CHOL  128  150   < >  153 " " 161   HDL  48*  56   < >  60  65   LDL  61  79   < >  71  81   TRIG  97  76   < >  111  77   CHOLHDLRATIO   --    --    --   2.6  2.5    < > = values in this interval not displayed.       3.  Aggressive diabetic prevention, screening and/or management.      You do not have diabetes as of the most recent blood tests.     4.  No smoking    5.  Consider taking low dose aspirin (81 mg) tablet once per day over the age of 50, every day unless there is a specific reason that you cannot take aspirin (such as side effect, allergy, or you are on another \"blood thinner\").        --Based on your current cardiac risk factors, you should take Aspirin 81 mg once per day if you are over 50 years of age.         "

## 2018-06-18 ENCOUNTER — TRANSFERRED RECORDS (OUTPATIENT)
Dept: HEALTH INFORMATION MANAGEMENT | Facility: CLINIC | Age: 83
End: 2018-06-18

## 2018-07-08 DIAGNOSIS — Z79.2 PROPHYLACTIC ANTIBIOTIC: ICD-10-CM

## 2018-07-09 NOTE — TELEPHONE ENCOUNTER
"Requested Prescriptions   Pending Prescriptions Disp Refills     amoxicillin (AMOXIL) 500 MG capsule [Pharmacy Med Name: AMOXICILLIN 500MG CAPS] 4 capsule 2     Sig: TAKE 4 CAPSULES BY MOUTH FOR 1 TIME DOSE 1 HOUR PRIOR TO DENTAL PROCEDURE    Oral Acne/Rosacea Medications Protocol Failed    7/8/2018  1:37 PM       Failed - Confirmation of diagnosis is required    Please confirm diagnosis is acne or rosacea.     If NOT acne or rosacea; refer request to provider for further evaluation.    If diagnosis IS acne or rosacea, OK to refill BASED ON PREVIOUS REFILL CLINICAL NOTE RECOMMENDATION.         Passed - Patient is 12 years of age or older       Passed - Recent (12 mo) or future (30 days) visit within the authorizing provider's specialty    Patient had office visit in the last 12 months or has a visit in the next 30 days with authorizing provider or within the authorizing provider's specialty.  See \"Patient Info\" tab in inbasket, or \"Choose Columns\" in Meds & Orders section of the refill encounter.           Passed - No active pregnancy on record       Passed - No positive prenancy test is past 12 months        Last Written Prescription Date:  9/19/17  Last Fill Quantity: 4,  # refills: 2   Last office visit: 5/24/2018 with prescribing provider:  5/24/18   Future Office Visit:      "

## 2018-07-10 RX ORDER — AMOXICILLIN 500 MG/1
CAPSULE ORAL
Qty: 4 CAPSULE | Refills: 2 | Status: SHIPPED | OUTPATIENT
Start: 2018-07-10 | End: 2019-06-21

## 2018-08-20 ENCOUNTER — OFFICE VISIT (OUTPATIENT)
Dept: URGENT CARE | Facility: URGENT CARE | Age: 83
End: 2018-08-20
Payer: MEDICARE

## 2018-08-20 VITALS
OXYGEN SATURATION: 96 % | TEMPERATURE: 98 F | DIASTOLIC BLOOD PRESSURE: 76 MMHG | RESPIRATION RATE: 16 BRPM | HEART RATE: 60 BPM | SYSTOLIC BLOOD PRESSURE: 136 MMHG

## 2018-08-20 DIAGNOSIS — H91.93 DECREASED HEARING OF BOTH EARS: Primary | ICD-10-CM

## 2018-08-20 DIAGNOSIS — H61.21 IMPACTED CERUMEN OF RIGHT EAR: ICD-10-CM

## 2018-08-20 DIAGNOSIS — H61.22 IMPACTED CERUMEN OF LEFT EAR: ICD-10-CM

## 2018-08-20 PROCEDURE — 69209 REMOVE IMPACTED EAR WAX UNI: CPT | Performed by: PHYSICIAN ASSISTANT

## 2018-08-20 PROCEDURE — 99213 OFFICE O/P EST LOW 20 MIN: CPT | Mod: 25 | Performed by: PHYSICIAN ASSISTANT

## 2018-08-20 NOTE — MR AVS SNAPSHOT
After Visit Summary   8/20/2018    Steffany Brown    MRN: [id number]           Patient Information     Date Of Birth          8/24/1930        Visit Information        Provider Department      8/20/2018 9:00 AM James Boucher PA-C [facility]        Today's Diagnoses     Decreased hearing of both ears    -  1    Impacted cerumen of left ear        Impacted cerumen of right ear           Follow-ups after your visit        Who to contact     If you have questions or need follow up information about today's clinic visit or your schedule please contact [facility] directly at [phone].  Normal or non-critical lab and imaging results will be communicated to you by MyChart, letter or phone within 4 business days after the clinic has received the results. If you do not hear from us within 7 days, please contact the clinic through [employer] or phone. If you have a critical or abnormal lab result, we will notify you by phone as soon as possible.  Submit refill requests through [employer] or call your pharmacy and they will forward the refill request to us. Please allow 3 business days for your refill to be completed.          Additional Information About Your Visit        MyChart Information     [employer] gives you secure access to your electronic health record. If you see a primary care provider, you can also send messages to your care team and make appointments. If you have questions, please call your primary care clinic.  If you do not have a primary care provider, please call [phone] and they will assist you.        Care EveryWhere ID     This is your Care EveryWhere ID. This could be used by other organizations to access your [facility] medical records  [id number]        Your Vitals Were     Pulse Temperature Respirations Pulse Oximetry          60 98  F (36.7  C) (Oral) 16 96%         Blood Pressure from Last 3 Encounters:   08/20/18 136/76    05/24/18 108/68   12/12/17 (!) 160/100    Weight from Last 3 Encounters:   05/24/18 123 lb 1.6 oz (55.8 kg)   12/12/17 117 lb (53.1 kg)   11/15/17 117 lb 3.2 oz (53.2 kg)              We Performed the Following     HC REMOVAL IMPACTED CERUMEN IRRIGATION/LVG UNILAT     HC REMOVAL IMPACTED CERUMEN IRRIGATION/LVG UNILAT        Primary Care Provider Office Phone # Fax #    Leo Todd Hancock [phone]       [address] [facility] [address]        Equal Access to Services     [facility] : Hadii aad ku hadasho Soomaali, waaxda luqadaha, qaybta kaalmada adeegyada, waxgeraldine cheng haycaityn adebilly plascencia . So [facility] [place] [facility] [phone].    ATENCIÓN: Si habla español, tiene a garcia disposición servicios gratuitos de asistencia lingüística. Llame al [phone].    We comply with applicable federal civil rights laws and [place] laws. We do not discriminate on the basis of race, color, national origin, age, disability, sex, sexual orientation, or gender identity.            Thank you!     Thank you for choosing [facility]  for your care. Our goal is always to provide you with excellent care. Hearing back from our patients is one way we can continue to improve our services. Please take a few minutes to complete the written survey that you may receive in the mail after your visit with us. Thank you!             Your Updated Medication List - Protect others around you: Learn how to safely use, store and throw away your medicines at www.disposemymeds.org.          This list is accurate as of 8/20/18 10:18 AM.  Always use your most recent med list.                   Brand Name Dispense Instructions for use Diagnosis    acetaminophen 500 MG tablet    TYLENOL     Take 500-1,000 mg by mouth every 6 hours as needed        amoxicillin 500 MG capsule    AMOXIL    4 capsule    TAKE 4 CAPSULES BY MOUTH FOR 1 TIME DOSE 1 HOUR PRIOR TO DENTAL PROCEDURE    Prophylactic antibiotic        atenolol 50 MG tablet    TENORMIN    90 tablet    Take 1 tablet (50 mg) by mouth daily    Benign essential hypertension       carbamide peroxide 6.5 % otic solution    DEBROX    30 mL    Place 5-10 drops into both ears 2 times daily    Bilateral impacted cerumen       cholecalciferol 2000 units Caps      Take 2 capsules by mouth 2 times daily        clopidogrel 75 MG tablet    PLAVIX    90 tablet    Take 1 tablet (75 mg) by mouth daily    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

## 2018-08-20 NOTE — PROGRESS NOTES
SUBJECTIVE:  Steffany Brown is a 87 year old female who presents with bilateral ear blockage and wax for 1 week(s).   Severity: mild and moderate   Timing:still present  Additional symptoms include blockage of ear.      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     Other speech disturbance 3/05    chronic dysarthria since CVI     Pelvic mass in female 10/3/11    s/p LEODAN/BSO; pathology:  Serous cystadenofibroma, 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     Personal history of colonic polyps      Psoriasis      Rheumatoid arthritis(714.0)      Unspecified essential hypertension      Patient Active Problem List   Diagnosis     Rheumatoid arthritis (H)     Osteoporosis     Benign essential hypertension     History of colonic polyps     Family history of malignant neoplasm of gastrointestinal tract     Other generalized ischemic cerebrovascular disease     Other speech disturbance     Hyposmolality and/or hyponatremia     Pain in joint, shoulder region     Aftercare for healing traumatic fracture of upper arm     Aftercare for healing traumatic fracture of upper leg     Other postprocedural status(V45.89)     Psoriasis     Hyperlipidemia LDL goal <100     Advance Care Planning     Allergic rhinitis     Eczema     Family history of colon cancer     Trimalleolar fracture of ankle, closed      Aphasia, late effect of cerebrovascular disease     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)     Closed right ankle fracture     Physical deconditioning     Health Care Home     Age-related macular degeneration, dry, both eyes     Closed right hip fracture (H)     Anemia due to blood loss, acute     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)     Urinary retention     Right ankle pain     Social History   Substance Use Topics     Smoking status: Former Smoker     Quit date: 1/1/1948     Smokeless tobacco: Never Used     Alcohol use Yes      Comment: rare       ROS:   CONSTITUTIONAL:NEGATIVE for fever, chills, change in weight  INTEGUMENTARY/SKIN: NEGATIVE for worrisome rashes, moles or lesions  ENT/MOUTH: POSITIVE for ear blockage  RESP:NEGATIVE for significant cough or SOB  CV: NEGATIVE for chest pain, palpitations or peripheral edema  NEURO: Positive for decreased hearing    OBJECTIVE:  /76  Pulse 60  Temp 98  F (36.7  C) (Oral)  Resp 16  SpO2 96%   EXAM:  The right TM is not visualized secondary to cerumen     The right auditory canal is obstructed with cerumen  The left TM is not visualized secondary to cerumen  The left auditory canal is obstructed with cerumen  Oropharynx exam is normal: no lesions, erythema, adenopathy or exudate.  GENERAL: no acute distress  SKIN: no suspicious lesions or rashes   NEURO: increased hearing after wax removal    ASSESSMENT/PLAN      ICD-10-CM    1. Decreased hearing of both ears H91.93    2. Impacted cerumen of left ear H61.22 HC REMOVAL IMPACTED CERUMEN IRRIGATION/LVG UNILAT   3. Impacted cerumen of right ear H61.21 HC REMOVAL IMPACTED CERUMEN IRRIGATION/LVG UNILAT       Orders Placed This Encounter     HC REMOVAL IMPACTED CERUMEN IRRIGATION/LVG UNILAT     HC REMOVAL IMPACTED CERUMEN IRRIGATION/LVG UNILAT       Hearing improved after nurse removed wax from both ears  May use OTC debrox  Follow up as needed  See  orders in Epic

## 2018-09-22 ENCOUNTER — HEALTH MAINTENANCE LETTER (OUTPATIENT)
Age: 83
End: 2018-09-22

## 2018-10-25 ENCOUNTER — HOSPITAL ENCOUNTER (EMERGENCY)
Facility: CLINIC | Age: 83
Discharge: HOME | End: 2018-10-25
Attending: NURSE PRACTITIONER | Admitting: NURSE PRACTITIONER
Payer: MEDICARE

## 2018-10-25 ENCOUNTER — APPOINTMENT (OUTPATIENT)
Dept: CT IMAGING | Facility: CLINIC | Age: 83
End: 2018-10-25
Attending: NURSE PRACTITIONER
Payer: MEDICARE

## 2018-10-25 VITALS
OXYGEN SATURATION: 95 % | SYSTOLIC BLOOD PRESSURE: 179 MMHG | DIASTOLIC BLOOD PRESSURE: 97 MMHG | TEMPERATURE: 98.5 F | HEART RATE: 78 BPM | RESPIRATION RATE: 18 BRPM

## 2018-10-25 DIAGNOSIS — R10.32 LLQ ABDOMINAL PAIN: ICD-10-CM

## 2018-10-25 LAB
ALBUMIN SERPL-MCNC: 3.3 G/DL (ref 3.4–5)
ALP SERPL-CCNC: 103 U/L (ref 40–150)
ALT SERPL W P-5'-P-CCNC: 17 U/L (ref 0–50)
ANION GAP SERPL CALCULATED.3IONS-SCNC: 4 MMOL/L (ref 3–14)
AST SERPL W P-5'-P-CCNC: 22 U/L (ref 0–45)
BASOPHILS # BLD AUTO: 0.1 10E9/L (ref 0–0.2)
BASOPHILS NFR BLD AUTO: 0.6 %
BILIRUB SERPL-MCNC: 0.1 MG/DL (ref 0.2–1.3)
BUN SERPL-MCNC: 9 MG/DL (ref 7–30)
CALCIUM SERPL-MCNC: 9.8 MG/DL (ref 8.5–10.1)
CHLORIDE SERPL-SCNC: 96 MMOL/L (ref 94–109)
CO2 SERPL-SCNC: 31 MMOL/L (ref 20–32)
CREAT SERPL-MCNC: 0.57 MG/DL (ref 0.52–1.04)
DIFFERENTIAL METHOD BLD: ABNORMAL
EOSINOPHIL # BLD AUTO: 0.8 10E9/L (ref 0–0.7)
EOSINOPHIL NFR BLD AUTO: 6.8 %
ERYTHROCYTE [DISTWIDTH] IN BLOOD BY AUTOMATED COUNT: 12.5 % (ref 10–15)
GFR SERPL CREATININE-BSD FRML MDRD: >90 ML/MIN/1.7M2
GLUCOSE SERPL-MCNC: 95 MG/DL (ref 70–99)
HCT VFR BLD AUTO: 40.7 % (ref 35–47)
HGB BLD-MCNC: 13.3 G/DL (ref 11.7–15.7)
IMM GRANULOCYTES # BLD: 0.1 10E9/L (ref 0–0.4)
IMM GRANULOCYTES NFR BLD: 0.5 %
LIPASE SERPL-CCNC: 174 U/L (ref 73–393)
LYMPHOCYTES # BLD AUTO: 2 10E9/L (ref 0.8–5.3)
LYMPHOCYTES NFR BLD AUTO: 17.3 %
MCH RBC QN AUTO: 30 PG (ref 26.5–33)
MCHC RBC AUTO-ENTMCNC: 32.7 G/DL (ref 31.5–36.5)
MCV RBC AUTO: 92 FL (ref 78–100)
MONOCYTES # BLD AUTO: 1.6 10E9/L (ref 0–1.3)
MONOCYTES NFR BLD AUTO: 13.9 %
NEUTROPHILS # BLD AUTO: 6.9 10E9/L (ref 1.6–8.3)
NEUTROPHILS NFR BLD AUTO: 60.9 %
NRBC # BLD AUTO: 0 10*3/UL
NRBC BLD AUTO-RTO: 0 /100
PLATELET # BLD AUTO: 341 10E9/L (ref 150–450)
POTASSIUM SERPL-SCNC: 4 MMOL/L (ref 3.4–5.3)
PROT SERPL-MCNC: 7.6 G/DL (ref 6.8–8.8)
RBC # BLD AUTO: 4.44 10E12/L (ref 3.8–5.2)
SODIUM SERPL-SCNC: 131 MMOL/L (ref 133–144)
WBC # BLD AUTO: 11.3 10E9/L (ref 4–11)

## 2018-10-25 PROCEDURE — 25000128 H RX IP 250 OP 636: Performed by: NURSE PRACTITIONER

## 2018-10-25 PROCEDURE — 96360 HYDRATION IV INFUSION INIT: CPT | Mod: 59

## 2018-10-25 PROCEDURE — 25000125 ZZHC RX 250: Performed by: NURSE PRACTITIONER

## 2018-10-25 PROCEDURE — 85025 COMPLETE CBC W/AUTO DIFF WBC: CPT | Performed by: NURSE PRACTITIONER

## 2018-10-25 PROCEDURE — 83690 ASSAY OF LIPASE: CPT | Performed by: NURSE PRACTITIONER

## 2018-10-25 PROCEDURE — 80053 COMPREHEN METABOLIC PANEL: CPT | Performed by: NURSE PRACTITIONER

## 2018-10-25 PROCEDURE — 74177 CT ABD & PELVIS W/CONTRAST: CPT

## 2018-10-25 PROCEDURE — 96361 HYDRATE IV INFUSION ADD-ON: CPT

## 2018-10-25 PROCEDURE — 99285 EMERGENCY DEPT VISIT HI MDM: CPT | Mod: 25

## 2018-10-25 RX ORDER — IOPAMIDOL 755 MG/ML
62 INJECTION, SOLUTION INTRAVASCULAR ONCE
Status: COMPLETED | OUTPATIENT
Start: 2018-10-25 | End: 2018-10-25

## 2018-10-25 RX ADMIN — IOPAMIDOL 62 ML: 755 INJECTION, SOLUTION INTRAVENOUS at 15:31

## 2018-10-25 RX ADMIN — SODIUM CHLORIDE 1000 ML: 9 INJECTION, SOLUTION INTRAVENOUS at 15:18

## 2018-10-25 RX ADMIN — SODIUM CHLORIDE, PRESERVATIVE FREE 60 ML: 5 INJECTION INTRAVENOUS at 15:31

## 2018-10-25 ASSESSMENT — ENCOUNTER SYMPTOMS
VOMITING: 0
ABDOMINAL PAIN: 1
DYSURIA: 0
NAUSEA: 0

## 2018-10-25 NOTE — ED AVS SNAPSHOT
Emergency Department    [address] [facility] [address]    Phone:  [phone]    Fax:  [phone]                                       Steffany Brown   MRN: [id number]    Department:   Emergency Department   Date of Visit:  10/25/2018           Patient Information     Date Of Birth          8/24/1930        Your diagnoses for this visit were:     LLQ abdominal pain        You were seen by Lilian Lugo APRN CNP.      Follow-up Information     Follow up with Leo Hancock MD In 4 days.    Specialty:  Internal Medicine    Contact information:    [address] [facility] [address]  [phone]          Discharge Instructions         Abdominal Pain    Abdominal pain is pain in the stomach or belly area. Everyone has this pain from time to time. In many cases it goes away on its own. But abdominal pain can sometimes be due to a serious problem, such as appendicitis. So it s important to know when to seek help.  Causes of abdominal pain  There are many possible causes of abdominal pain. Common causes in adults include:    Constipation, diarrhea, or gas    Stomach acid flowing back up into the esophagus (acid reflux or heartburn)    Severe acid reflux, called GERD (gastroesophageal reflux disease)    A sore in the lining of the stomach or small intestine (peptic ulcer)    Inflammation of the gallbladder, liver, or pancreas    Gallstones or kidney stones    Appendicitis     Intestinal blockage     An internal organ pushing through a muscle or other tissue (hernia)    Urinary tract infections    In women, menstrual cramps, fibroids, or endometriosis    Inflammation or infection of the intestines  Diagnosing the cause of abdominal pain  Your healthcare provider will do a physical exam help find the cause of your pain. If needed, tests will be ordered. Belly pain has many possible causes. So it can be hard to find the reason for your pain. Giving details about your pain  can help. Tell your provider where and when you feel the pain, and what makes it better or worse. Also let your provider know if you have other symptoms such as:    Fever    Tiredness    Upset stomach (nausea)    Vomiting    Changes in bathroom habits  Treating abdominal pain  Some causes of pain need emergency medical treatment right away. These include appendicitis or a bowel blockage. Other problems can be treated with rest, fluids, or medicines. Your healthcare provider can give you specific instructions for treatment or self-care based on what is causing your pain.  If you have vomiting or diarrhea, sip water or other clear fluids. When you are ready to eat solid foods again, start with small amounts of easy-to-digest, low-fat foods. These include apple sauce, toast, or crackers.   When to seek medical care  Call 911 or go to the hospital right away if you:    Can t pass stool and are vomiting    Are vomiting blood or have bloody diarrhea or black, tarry diarrhea    Have chest, neck, or shoulder pain    Feel like you might pass out    Have pain in your shoulder blades with nausea    Have sudden, severe belly pain    Have new, severe pain unlike any you have felt before    Have a belly that is rigid, hard, and tender to touch  Call your healthcare provider if you have:    Pain for more than 5 days    Bloating for more than 2 days    Diarrhea for more than 5 days    A fever of 100.4 F (38 C) or higher, or as directed by your healthcare provider    Pain that gets worse    Weight loss for no reason    Continued lack of appetite    Blood in your stool  How to prevent abdominal pain  Here are some tips to help prevent abdominal pain:    Eat smaller amounts of food at one time.    Avoid greasy, fried, or other high-fat foods.    Avoid foods that give you gas.    Exercise regularly.    Drink plenty of fluids.  To help prevent GERD symptoms:    Quit smoking.    Reduce alcohol and certain foods that increase stomach  acid.    Avoid aspirin and over-the-counter pain and fever medicines (NSAIDS or nonsteroidal anti-inflammatory drugs), if possible    Lose extra weight.    Finish eating at least 2 hours before you go to bed or lie down.    Raise the head of your bed.  Date Last Reviewed: 7/1/2016 2000-2017 [employer]. [address] [place] [address]. All rights reserved. This information is not intended as a substitute for professional medical care. Always follow your healthcare professional's instructions.          Your next 10 appointments already scheduled     Nov 08, 2018  7:45 AM CST   [facility]   [facility] ([facility]    [address] [place] [id number] [phone]           Please do not eat 10-12 hours before your appointment if you are coming in fasting for labs on lipids, cholesterol, or glucose (sugar). This does not apply to pregnant women. Water, hot tea and black coffee (with nothing added) are okay. Do not drink other fluids, diet soda or chew gum.            Nov 13, 2018  7:40 AM CST   Office Visit with Leo Hancock MD   [facility] ([facility])    [address] [place] [id number] [phone]           Bring a current list of meds and any records pertaining to this visit. For Physicals, please bring immunization records and any forms needing to be filled out. Please arrive 10 minutes early to complete paperwork.              24 Hour Appointment Hotline       To make an appointment at any [facility], call [phone] ([phone]). If you don't have a family doctor or clinic, we will help you find one. [facility] clinics are conveniently located to serve the needs of you and your family.             Review of your medicines      Our records show that you are taking the medicines listed below. If these are incorrect, please  call your family doctor or clinic.        Dose / Directions Last dose taken    acetaminophen 500 MG tablet   Commonly known as:  TYLENOL   Dose:  500-1000 mg        Take 500-1,000 mg by mouth every 6 hours as needed   Refills:  0        amoxicillin 500 MG capsule   Commonly known as:  AMOXIL   Quantity:  4 capsule        TAKE 4 CAPSULES BY MOUTH FOR 1 TIME DOSE 1 HOUR PRIOR TO DENTAL PROCEDURE   Refills:  2        atenolol 50 MG tablet   Commonly known as:  TENORMIN   Dose:  50 mg   Quantity:  90 tablet        Take 1 tablet (50 mg) by mouth daily   Refills:  3        carbamide peroxide 6.5 % otic solution   Commonly known as:  DEBROX   Dose:  5-10 drop   Quantity:  30 mL        Place 5-10 drops into both ears 2 times daily   Refills:  0        cholecalciferol 2000 units Caps   Dose:  2 capsule        Take 2 capsules by mouth 2 times daily   Refills:  0        clopidogrel 75 MG tablet   Commonly known as:  PLAVIX   Dose:  75 mg   Quantity:  90 tablet        Take 1 tablet (75 mg) by mouth daily   Refills:  3        fluticasone 50 MCG/ACT spray   Commonly known as:  FLONASE   Dose:  2 spray   Quantity:  16 g        Spray 2 sprays into both nostrils daily as needed for rhinitis   Refills:  5        loratadine 10 MG tablet   Commonly known as:  CLARITIN   Dose:  10 mg        Take 10 mg by mouth daily   Refills:  0        lovastatin 40 MG tablet   Commonly known as:  MEVACOR   Dose:  40 mg   Quantity:  90 tablet        Take 1 tablet (40 mg) by mouth At Bedtime   Refills:  3        PRESERVISION AREDS Caps   Dose:  1 capsule        Take 1 capsule by mouth 2 times daily   Refills:  0                Procedures and tests performed during your visit     CBC with platelets differential    CT Abdomen Pelvis w Contrast    Comprehensive metabolic panel    Lipase      Orders Needing Specimen Collection     None      Pending Results     No orders found from 10/23/2018 to 10/26/2018.            Pending Culture Results     No orders  found from 10/23/2018 to 10/26/2018.            Pending Results Instructions     If you had any lab results that were not finalized at the time of your Discharge, you can call the ED Lab Result RN at [phone]. You will be contacted by this team for any positive Lab results or changes in treatment. The nurses are available 7 days a week from 10A to 6:30P.  You can leave a message 24 hours per day and they will return your call.        Test Results From Your Hospital Stay        10/25/2018  2:42 PM      Component Results     Component Value Ref Range & Units Status    WBC 11.3 (H) 4.0 - 11.0 10e9/L Final    RBC Count 4.44 3.8 - 5.2 10e12/L Final    Hemoglobin 13.3 11.7 - 15.7 g/dL Final    Hematocrit 40.7 35.0 - 47.0 % Final    MCV 92 78 - 100 fl Final    MCH 30.0 26.5 - 33.0 pg Final    MCHC 32.7 31.5 - 36.5 g/dL Final    RDW 12.5 10.0 - 15.0 % Final    Platelet Count 341 150 - 450 10e9/L Final    Diff Method Automated Method  Final    % Neutrophils 60.9 % Final    % Lymphocytes 17.3 % Final    % Monocytes 13.9 % Final    % Eosinophils 6.8 % Final    % Basophils 0.6 % Final    % Immature Granulocytes 0.5 % Final    Nucleated RBCs 0 0 /100 Final    Absolute Neutrophil 6.9 1.6 - 8.3 10e9/L Final    Absolute Lymphocytes 2.0 0.8 - 5.3 10e9/L Final    Absolute Monocytes 1.6 (H) 0.0 - 1.3 10e9/L Final    Absolute Eosinophils 0.8 (H) 0.0 - 0.7 10e9/L Final    Absolute Basophils 0.1 0.0 - 0.2 10e9/L Final    Abs Immature Granulocytes 0.1 0 - 0.4 10e9/L Final    Absolute Nucleated RBC 0.0  Final         10/25/2018  2:57 PM      Component Results     Component Value Ref Range & Units Status    Sodium 131 (L) 133 - 144 mmol/L Final    Potassium 4.0 3.4 - 5.3 mmol/L Final    Chloride 96 94 - 109 mmol/L Final    Carbon Dioxide 31 20 - 32 mmol/L Final    Anion Gap 4 3 - 14 mmol/L Final    Glucose 95 70 - 99 mg/dL Final    Urea Nitrogen 9 7 - 30 mg/dL Final    Creatinine 0.57 0.52 - 1.04 mg/dL Final    GFR Estimate >90 >60  mL/min/1.7m2 Final    Non  GFR Calc    GFR Estimate If Black >90 >60 mL/min/1.7m2 Final    African American GFR Calc    Calcium 9.8 8.5 - 10.1 mg/dL Final    Bilirubin Total 0.1 (L) 0.2 - 1.3 mg/dL Final    Albumin 3.3 (L) 3.4 - 5.0 g/dL Final    Protein Total 7.6 6.8 - 8.8 g/dL Final    Alkaline Phosphatase 103 40 - 150 U/L Final    ALT 17 0 - 50 U/L Final    AST 22 0 - 45 U/L Final         10/25/2018  4:21 PM      Narrative     CT ABDOMEN AND PELVIS WITH CONTRAST   10/25/2018 3:37 PM     HISTORY: Left-sided abdominal pain.    TECHNIQUE:  CT abdomen and pelvis with 62 mL Isovue-370 IV. Radiation  dose for this scan was reduced using automated exposure control,  adjustment of the mA and/or kV according to patient size, or iterative  reconstruction technique.    COMPARISON: CT abdomen and pelvis 10/18/2017.    FINDINGS:   Liver, gallbladder, adrenals, spleen, pancreas, and kidneys do not  show any acute abnormalities. Vascular calcifications noted. No bowel  obstruction is seen. There are postoperative changes again noted  involving the bowel. No free air or abscess identified. No acute  inflammatory change of the bowel identified. There are several small  retroperitoneal lymph nodes again noted without significant change.  One of these is mildly prominent and stable measuring 1.3 x 0.8 cm  left distal para-aortic location, image 44. Bilateral proximal femoral  surgical changes again noted, stable.        Impression     IMPRESSION:  1. No specific acute abnormality is seen.  2. Stable borderline prominent retroperitoneal lymph node.    SARKIS TORRES MD         10/25/2018  3:29 PM      Component Results     Component Value Ref Range & Units Status    Lipase 174 73 - 393 U/L Final                Clinical Quality Measure: Blood Pressure Screening     Your blood pressure was checked while you were in the emergency department today. The last reading we obtained was  BP: 197/78 . Please read the guidelines  below about what these numbers mean and what you should do about them.  If your systolic blood pressure (the top number) is less than 120 and your diastolic blood pressure (the bottom number) is less than 80, then your blood pressure is normal. There is nothing more that you need to do about it.  If your systolic blood pressure (the top number) is 120-139 or your diastolic blood pressure (the bottom number) is 80-89, your blood pressure may be higher than it should be. You should have your blood pressure rechecked within a year by a primary care provider.  If your systolic blood pressure (the top number) is 140 or greater or your diastolic blood pressure (the bottom number) is 90 or greater, you may have high blood pressure. High blood pressure is treatable, but if left untreated over time it can put you at risk for heart attack, stroke, or kidney failure. You should have your blood pressure rechecked by a primary care provider within the next 4 weeks.  If your provider in the emergency department today gave you specific instructions to follow-up with your doctor or provider even sooner than that, you should follow that instruction and not wait for up to 4 weeks for your follow-up visit.        Thank you for choosing [facility]       Thank you for choosing [facility] for your care. Our goal is always to provide you with excellent care. Hearing back from our patients is one way we can continue to improve our services. Please take a few minutes to complete the written survey that you may receive in the mail after you visit with us. Thank you!        [employer] [facility] Information     [email or web address] gives you secure access to your electronic health record. If you see a primary care provider, you can also send messages to your care team and make appointments. If you have questions, please call your primary care clinic.  If you do not have a primary care provider, please call [phone] and they will assist you.        Care EveryWhere ID      This is your Care EveryWhere ID. This could be used by other organizations to access your [facility] medical records  [id number]        Equal Access to Services     LISA BARRERA : Mercedez Chirinos, radha hope, cris rolon, oanh gleason. So [facility] [phone].    ATENCIÓN: Si habla español, tiene a garcia disposición servicios gratuitos de asistencia lingüística. Llame al [phone].    We comply with applicable federal civil rights laws and [place] laws. We do not discriminate on the basis of race, color, national origin, age, disability, sex, sexual orientation, or gender identity.            After Visit Summary       This is your record. Keep this with you and show to your community pharmacist(s) and doctor(s) at your next visit.

## 2018-10-25 NOTE — ED NOTES
Bed: ED26  Expected date:   Expected time:   Means of arrival:   Comments:  Frederick 518 abd pain 88 female

## 2018-10-25 NOTE — DISCHARGE INSTRUCTIONS

## 2018-10-25 NOTE — ED AVS SNAPSHOT
Emergency Department    [address] [place] [id number]    Phone:  [phone]    Fax:  [phone]                                       Steffany Brown   MRN: [id number]    Department:   Emergency Department   Date of Visit:  10/25/2018           After Visit Summary Signature Page     I have received my discharge instructions, and my questions have been answered. I have discussed any challenges I see with this plan with the nurse or doctor.    ..........................................................................................................................................  Patient/Patient Representative Signature      ..........................................................................................................................................  Patient Representative Print Name and Relationship to Patient    ..................................................               ................................................  Date                                   Time    ..........................................................................................................................................  Reviewed by Signature/Title    ...................................................              ..............................................  Date                                               Time          22EPIC Rev 08/18

## 2018-10-25 NOTE — ED PROVIDER NOTES
History     Chief Complaint:  Abdominal Pain    The history is provided by the patient and a caregiver. History limited by: baseline expressive aphasia.      Steffany Brown is a 88 year old female with a history of physical deconditioning and expressive aphasia following a CVA who presents for evaluation of left lower quadrant abdominal pain. The patient's caretaker reports that the patient has been dealing with chronic abdominal pain for a long time now which has exacerbated in the last week. She was evaluated at [facility] on 10/18/2017 with no abnormal findings (ED Course note below). The patient took two Tylenol earlier today around noon. Patient denies dysuria or other change in urination, nausea, vomiting, or other complaint.      Imaging:  CT Abdomen/Pelvis with IV contrast:   1. No acute abnormality. No bowel obstruction or inflammation.  2. The urinary bladder is very distended but otherwise appears normal. As per radiology.     Laboratory:  CBC: WBC: 9.7, HGB: 13.5, PLT: 401  CMP: Glucose 100(H), Albumin: 3.2(L), NA: 130(L), o/w WNL (Creatinine: 0.58)  Lipase:156      UA with micro: all negative    Allergies:  Atorvastatin Calcium  Lisinopril  Seasonal Allergies      Medications:    Tenormin  Plavix  Flonase  Claritin  Lovastatin   Multivitamin       Past Medical History:    SIADH  Urinary retention  Age-related macular degeneration  HCH  Physical deconditioning  Aphasia, late effect of CVD  Hemiplegia of dominant side  Trimalleolar fracture of ankle  ACP  Allergic rhinitis  Psoriasis  Speech disturbance  Rheumatoid arthritis  Osteoarthritis   Benign essential hypertension    Past Surgical History:    Appendectomy  Colonoscopy  D&C  Hysterectomy  ORIF of ankle  Anterior laparoscopic repair of sigmoid prolapse  Stress ECHO  Small intestine surgery    Family History:    Colorectal cancer  CVD    Social History:  Presents via EMS    Tobacco use: Former smoker (quit 1/1/1948)  Alcohol use: Yes  (rarely)  PCP: Leo Hancock    Marital Status:  Single     Review of Systems   Gastrointestinal: Positive for abdominal pain. Negative for nausea and vomiting.   Genitourinary: Negative.  Negative for dysuria.   All other systems reviewed and are negative.    Physical Exam     Patient Vitals for the past 24 hrs:   BP Temp Pulse Resp SpO2   10/25/18 1556 197/78 - 78 - 96 %   10/25/18 1555 197/78 - - - -   10/25/18 1418 197/83 98.5  F (36.9  C) 68 18 98 %        Physical Exam  Physical Exam   Constitutional: Pt appears well-developed and well-nourished. Non toxic appearing.   Head: Head moves freely with normal range of motion.   ENT: Oropharynx is clear and moist.   Eyes: Conjunctivae pink. EOMs intact. No scleral icterus.   Neck: Normal range of motion.    Cardiovascular: Regular rate and rhythm. Normal heart sounds. No concerning murmur.  Pulmonary/Chest: No respiratory distress. No decreased breath sounds. No wheezes. No rhonchi. No rales.   Abdominal: Soft. LLQ tenderness. No herniation palpated. No rebound, no guarding. No CVA tenderness. No pain over McBurney's point. Negative Robins's sign. No erythema or heat to touch over the abdomen.    Musculoskeletal: No peripheral edema. Distal capillary refill and sensation intact.  Neurological: Oriented to person, place, and time. No focal deficits.   Skin: Skin is warm and normal in color. No rash noted.       Emergency Department Course     Imaging:  Radiographic findings were communicated with the patient who voiced understanding of the findings.    CT Abd/pelvis with contrast:  IMPRESSION:  1. No specific acute abnormality is seen.  2. Stable borderline prominent retroperitoneal lymph node.    Imaging independently reviewed and agree with radiologist interpretation.      Laboratory:  CBC: WBC 11.3 (H) o/w WNL (HGB 13.3, )   CMP: Na 131 (L), Bilirubin 0.1 (L), Albumin 3.3 (L) o/w WNL (Creatinine 0.57)   Lipase: 174     Interventions:  1518: NS 1L  IV Bolus       Emergency Department Course:  Past medical records, nursing notes, and vitals reviewed.  1500: I performed an exam of the patient and obtained history, as documented above.    IV inserted and blood drawn. Above interventions provided. Blood was sent to the lab for further testing, results above.   The patient was sent for a CT while in the emergency department, findings above.     1732: I rechecked the patient. Findings and plan explained to the Patient. Patient discharged home with instructions regarding supportive care, medications, and reasons to return. The importance of close follow-up was reviewed.      Impression & Plan      Medical Decision Making:  Steffany Brown is a 88 year old female who presents with chronic left lower quadrant abdominal pain.  She looks overall well and without a concerning etiology of abdominal pain.  A broad differential diagnosis was of course considered. The workup in the ED, in including CT abdomen pelvis with contrast  is at this point negative. No etiology for the patients pain is found at this point and my suspicion of an intraabdominal catastrophe or other worrisome etiology is very low. CT and lab work are reassuring. Lipase negative. Slightly decreased sodium at 131 which, per chart review, is chronic for this patient. I will not therefore admit her for serial exams and further workup.  Patient is hemodynamically stable in ED. We discussed observation admission versus discharge home, she would prefer to discharge home and notes Tylenol has been reducing the pain. We discussed reasons to return here and close primary care follow up. Patient is amenable to plan.     Diagnosis:    ICD-10-CM   1. LLQ abdominal pain R10.32       Disposition:  Discharged to home with plan as outlined.      Scribe Disclosure:  I, Arnold Israel, am serving as a scribe at 3:14 PM on 10/25/2018 to document services personally performed by Lilian Lugo NP based on my  observations and the provider's statements to me.  10/25/2018   EMERGENCY DEPARTMENT      Lilian Lugo, APRN CNP  10/25/18 8056

## 2018-11-08 DIAGNOSIS — I10 BENIGN ESSENTIAL HYPERTENSION: ICD-10-CM

## 2018-11-08 DIAGNOSIS — E22.2 SIADH (SYNDROME OF INAPPROPRIATE ADH PRODUCTION) (H): ICD-10-CM

## 2018-11-08 LAB
ANION GAP SERPL CALCULATED.3IONS-SCNC: 10 MMOL/L (ref 3–14)
BUN SERPL-MCNC: 7 MG/DL (ref 7–30)
CALCIUM SERPL-MCNC: 9.7 MG/DL (ref 8.5–10.1)
CHLORIDE SERPL-SCNC: 94 MMOL/L (ref 94–109)
CO2 SERPL-SCNC: 27 MMOL/L (ref 20–32)
CREAT SERPL-MCNC: 0.55 MG/DL (ref 0.52–1.04)
GFR SERPL CREATININE-BSD FRML MDRD: >90 ML/MIN/1.7M2
GLUCOSE SERPL-MCNC: 93 MG/DL (ref 70–99)
HGB BLD-MCNC: 13.8 G/DL (ref 11.7–15.7)
POTASSIUM SERPL-SCNC: 3.9 MMOL/L (ref 3.4–5.3)
SODIUM SERPL-SCNC: 131 MMOL/L (ref 133–144)

## 2018-11-08 PROCEDURE — 36415 COLL VENOUS BLD VENIPUNCTURE: CPT | Performed by: INTERNAL MEDICINE

## 2018-11-08 PROCEDURE — 80048 BASIC METABOLIC PNL TOTAL CA: CPT | Performed by: INTERNAL MEDICINE

## 2018-11-08 PROCEDURE — 85018 HEMOGLOBIN: CPT | Performed by: INTERNAL MEDICINE

## 2018-11-13 ENCOUNTER — OFFICE VISIT (OUTPATIENT)
Dept: INTERNAL MEDICINE | Facility: CLINIC | Age: 83
End: 2018-11-13
Payer: MEDICARE

## 2018-11-13 VITALS
WEIGHT: 128 LBS | SYSTOLIC BLOOD PRESSURE: 118 MMHG | BODY MASS INDEX: 25 KG/M2 | OXYGEN SATURATION: 95 % | DIASTOLIC BLOOD PRESSURE: 80 MMHG | HEART RATE: 70 BPM | TEMPERATURE: 97.8 F

## 2018-11-13 DIAGNOSIS — I69.959 HEMIPLEGIA OF DOMINANT SIDE AS LATE EFFECT FOLLOWING CEREBROVASCULAR DISEASE (H): ICD-10-CM

## 2018-11-13 DIAGNOSIS — E22.2 SIADH (SYNDROME OF INAPPROPRIATE ADH PRODUCTION) (H): ICD-10-CM

## 2018-11-13 DIAGNOSIS — I10 BENIGN ESSENTIAL HYPERTENSION: Primary | ICD-10-CM

## 2018-11-13 DIAGNOSIS — E78.5 HYPERLIPIDEMIA LDL GOAL <100: ICD-10-CM

## 2018-11-13 DIAGNOSIS — I69.920 APHASIA, LATE EFFECT OF CEREBROVASCULAR DISEASE: ICD-10-CM

## 2018-11-13 PROCEDURE — 99214 OFFICE O/P EST MOD 30 MIN: CPT | Performed by: INTERNAL MEDICINE

## 2018-11-13 NOTE — MR AVS SNAPSHOT
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  Contact your usual pharmacy if you need refills.     *  Return to see me in approximately 6 months, sooner if needed.  Please get nonfasting labs done in the [facility] any other [facility] lab 1-2 days before this appointment.  If you get the labs done at another clinic, make arrangements with that clinic directly.  The orders will be in place.  Call [phone] to schedule appointments at [facility].      5 GOALS TO PREVENT VASCULAR DISEASE:     1.  Aggressive blood pressure control, under 130/80 ideally.  Using medications if needed.    Your blood pressure is under good control    BP Readings from Last 4 Encounters:   11/13/18 118/80   10/25/18 (!) 179/97   08/20/18 136/76   05/24/18 108/68       2.  Aggressive LDL cholesterol (\"bad cholesterol\") lowering as indicated.    Your goal is an LDL under 130 for sure, preferably under 100.  (If you have diabetes or previous vascular disease, the the LDL goals would be under 100 for sure, preferably under 70.)    New guidelines identify four high-risk groups who could benefit from statins:   *people with pre-existing heart disease, such as those who have had a heart attack;   *people ages 40 to 75 who have diabetes of any type  *patients ages 40 to 75 with at least a 7.5% risk of developing " "cardiovascular disease over the next decade, according to a formula described in the guidelines  *patients with the sort of super-high cholesterol that sometimes runs in families, as evidenced by an LDL of 190 milligrams per deciliter or higher    Your cholesterol levels are well controlled.    Recent Labs   Lab Test  05/22/18   0744  05/24/17   0744   05/12/15   0742  05/19/14   0741   CHOL  128  150   < >  153  161   HDL  48*  56   < >  60  65   LDL  61  79   < >  71  81   TRIG  97  76   < >  111  77   CHOLHDLRATIO   --    --    --   2.6  2.5    < > = values in this interval not displayed.       3.  Aggressive diabetic prevention, screening and/or management.      You do not have diabetes as of the most recent blood tests.     4.  No smoking    5.  Daily aspirin: Have a discussion about the relative benefits and risks of taking daily low dose aspirin (81 mg) tablet once per day over the age of 50, unless there is a specific reason that you cannot take aspirin (such as side effect, allergy, or you are on another \"blood thinner\").        --Based on your current cardiac risk factors, you should NOT take Aspirin because you are on the plavix (Clopiogrel)    SHINGLES VACCINE:     I would recommend that you consider getting a \"shingles vaccine\".  The shingles vaccine does not stop you from getting shingles, but it decreases the intensity of the event, the duration of the event, and decreases the painful nerve condition that results     There are two options available:     --Shingrix (available starting early 2018), 2 shots, 2-6 months apart  **recommended**   OR   --Zostavax, one shot    --Based on the available data, the Shingrix vaccine has superior benefit and should be considered even if you have had the Zostavax vaccine before.      --Contact your insurance provider and ask them if either shingles vaccine is covered and is so, how much it will cost you.  Usually this will be cheaper to get in a pharmacy given by " the pharmacist.    --Regardless of the coverage, I would recommend that you consider the vaccine since shingles is very painful, just ask anyone who has ever had it.                       Follow-ups after your visit        Follow-up notes from your care team     Return in about 6 months (around 5/13/2019) for Blood pressure, Lab Work, Medicare Annual Wellness Exam.      Who to contact     If you have questions or need follow up information about today's clinic visit or your schedule please contact [facility] directly at [phone].  Normal or non-critical lab and imaging results will be communicated to you by ReturnHaulerhart, letter or phone within 4 business days after the clinic has received the results. If you do not hear from us within 7 days, please contact the clinic through [employer] or phone. If you have a critical or abnormal lab result, we will notify you by phone as soon as possible.  Submit refill requests through [employer] or call your pharmacy and they will forward the refill request to us. Please allow 3 business days for your refill to be completed.          Additional Information About Your Visit        ReturnHaulerhart Information     [employer] gives you secure access to your electronic health record. If you see a primary care provider, you can also send messages to your care team and make appointments. If you have questions, please call your primary care clinic.  If you do not have a primary care provider, please call [phone] and they will assist you.        Care EveryWhere ID     This is your Care EveryWhere ID. This could be used by other organizations to access your [place] medical records  [id number]        Your Vitals Were     Pulse Temperature Pulse Oximetry BMI (Body Mass Index)          70 97.8  F (36.6  C) (Oral) 95% 25 kg/m2         Blood Pressure from Last 3 Encounters:   11/13/18 118/80   10/25/18 (!) 179/97   08/20/18 136/76    Weight from Last 3 Encounters:   11/13/18 128  lb (58.1 kg)   05/24/18 123 lb 1.6 oz (55.8 kg)   12/12/17 117 lb (53.1 kg)              Today, you had the following     No orders found for display       Primary Care Provider Office Phone # Fax #    Leo Hancock [phone]       [address] [facility] [address]        Equal Access to Services     LISA BARRERA : Hadii aad ku hadasho Soomaali, waaxda luqadaha, qaybta kaalmada adeegyada, waxay idiin hayaan adeeg kharash la'aan ah. So [facility] [phone].    ATENCIÓN: Si habla espbarbara, tiene a garcia disposición servicios gratuitos de asistencia lingüística. Llame al [phone].    We comply with applicable federal civil rights laws and [place] laws. We do not discriminate on the basis of race, color, national origin, age, disability, sex, sexual orientation, or gender identity.            Thank you!     Thank you for choosing [facility]  for your care. Our goal is always to provide you with excellent care. Hearing back from our patients is one way we can continue to improve our services. Please take a few minutes to complete the written survey that you may receive in the mail after your visit with us. Thank you!             Your Updated Medication List - Protect others around you: Learn how to safely use, store and throw away your medicines at www.disposemymeds.org.          This list is accurate as of 11/13/18  8:15 AM.  Always use your most recent med list.                   Brand Name Dispense Instructions for use Diagnosis    acetaminophen 500 MG tablet    TYLENOL     Take 500-1,000 mg by mouth every 6 hours as needed        amoxicillin 500 MG capsule    AMOXIL    4 capsule    TAKE 4 CAPSULES BY MOUTH FOR 1 TIME DOSE 1 HOUR PRIOR TO DENTAL PROCEDURE    Prophylactic antibiotic       atenolol 50 MG tablet    TENORMIN    90 tablet    Take 1 tablet (50 mg) by mouth daily    Benign essential hypertension       carbamide peroxide 6.5 % otic solution    DEBROX    30  mL    Place 5-10 drops into both ears 2 times daily    Bilateral impacted cerumen       cholecalciferol 2000 units Caps      Take 2 capsules by mouth 2 times daily        clopidogrel 75 MG tablet    PLAVIX    90 tablet    Take 1 tablet (75 mg) by mouth daily    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

## 2018-11-13 NOTE — PATIENT INSTRUCTIONS
"*  Continue all medications at the same doses.  Contact your usual pharmacy if you need refills.     *  Return to see me in approximately 6 months, sooner if needed.  Please get nonfasting labs done in the [facility] any other [facility] lab 1-2 days before this appointment.  If you get the labs done at another clinic, make arrangements with that clinic directly.  The orders will be in place.  Call [phone] to schedule appointments at [facility].      5 GOALS TO PREVENT VASCULAR DISEASE:     1.  Aggressive blood pressure control, under 130/80 ideally.  Using medications if needed.    Your blood pressure is under good control    BP Readings from Last 4 Encounters:   11/13/18 118/80   10/25/18 (!) 179/97   08/20/18 136/76   05/24/18 108/68       2.  Aggressive LDL cholesterol (\"bad cholesterol\") lowering as indicated.    Your goal is an LDL under 130 for sure, preferably under 100.  (If you have diabetes or previous vascular disease, the the LDL goals would be under 100 for sure, preferably under 70.)    New guidelines identify four high-risk groups who could benefit from statins:   *people with pre-existing heart disease, such as those who have had a heart attack;   *people ages 40 to 75 who have diabetes of any type  *patients ages 40 to 75 with at least a 7.5% risk of developing cardiovascular disease over the next decade, according to a formula described in the guidelines  *patients with the sort of super-high cholesterol that sometimes runs in families, as evidenced by an LDL of 190 milligrams per deciliter or higher    Your cholesterol levels are well controlled.    Recent Labs   Lab Test  05/22/18   0744  05/24/17   0744   05/12/15   0742  05/19/14   0741   CHOL  128  150   < >  153  161   HDL  48*  56   < >  60  65   LDL  61  79   < >  71  81   TRIG  97  76   < >  111  77   CHOLHDLRATIO   --    --    --   2.6  2.5    < > = values in this interval not displayed.       3.  Aggressive diabetic " "prevention, screening and/or management.      You do not have diabetes as of the most recent blood tests.     4.  No smoking    5.  Daily aspirin: Have a discussion about the relative benefits and risks of taking daily low dose aspirin (81 mg) tablet once per day over the age of 50, unless there is a specific reason that you cannot take aspirin (such as side effect, allergy, or you are on another \"blood thinner\").        --Based on your current cardiac risk factors, you should NOT take Aspirin because you are on the plavix (Clopiogrel)    SHINGLES VACCINE:     I would recommend that you consider getting a \"shingles vaccine\".  The shingles vaccine does not stop you from getting shingles, but it decreases the intensity of the event, the duration of the event, and decreases the painful nerve condition that results     There are two options available:     --Shingrix (available starting early 2018), 2 shots, 2-6 months apart  **recommended**   OR   --Zostavax, one shot    --Based on the available data, the Shingrix vaccine has superior benefit and should be considered even if you have had the Zostavax vaccine before.      --Contact your insurance provider and ask them if either shingles vaccine is covered and is so, how much it will cost you.  Usually this will be cheaper to get in a pharmacy given by the pharmacist.    --Regardless of the coverage, I would recommend that you consider the vaccine since shingles is very painful, just ask anyone who has ever had it.               "

## 2018-11-13 NOTE — PROGRESS NOTES
SUBJECTIVE:   Steffany Brown is a 88 year old female who presents to clinic today for the following health issues:      Go over labs   Hypertension Follow-up      Outpatient blood pressures are not being checked.    Low Salt Diet: no added salt      Amount of exercise or physical activity: None    Problems taking medications regularly: No    Medication side effects: none    Diet: regular (no restrictions)      2.  Recent episode of LLQ abd pain.,   Seen [facility] reviewed results  Labs and CT scan were both unremarkable.   Tylenol helps the pain.   Is prone to constipation/obstipation. Pain relief after passing larger BMs.       Problem list and histories reviewed & adjusted, as indicated.  Additional history: as documented        Reviewed and updated as needed this visit by clinical staff  Tobacco  Allergies  Meds       Reviewed and updated as needed this visit by Provider           Past Medical History:  ---------------------------  Past Medical History:   Diagnosis Date     Age-related macular degeneration, dry, both eyes 10/24/14    per vitreoretinal specialists     Cerebral infarction (H)     aphasia and chronic right sided waekness     Diverticulosis of colon (without mention of hemorrhage)     Several diverticuli seen on colonoscopy     Eczema      Family history of colon cancer     mother  from colon cancer age 89     Hip fracture, intertrochanteric (H) 9/10/09    left hip fracture, S/P ORIF     History of small bowel obstruction ,      Osteoporosis, unspecified      Other and unspecified hyperlipidemia      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     Other speech disturbance 3/05    chronic dysarthria since CVI     Pelvic mass in female 10/3/11    s/p LEODAN/BSO; pathology:  Serous cystadenofibroma, 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     Personal history of colonic polyps       Psoriasis      Rheumatoid arthritis(714.0)      Unspecified essential hypertension        Past Surgical History:  ---------------------------  Past Surgical History:   Procedure Laterality Date     C APPENDECTOMY  1985     COLONOSCOPY  5/14/03    Diverticulosis, single small polyp removed (at [facility])     COLONOSCOPY  6/26/08    diverticulosis, few scattered mild angioectasias, no polyps     COLONOSCOPY  7/17/2013    Procedure: COLONOSCOPY;  COLONOSCOPY ;  Surgeon: Romario Watters MD;  Location:  GI     D & C       FRACTURE TX, HIP RT/LT  9/10/09    Open reduction with IM nailing of left hip fracture using a gamma nail     HC COLONOSCOPY THRU STOMA W BIOPSY/CAUTERY TUMOR/POLYP/LESION  3/00    colonoscopy adenamatous polyp ([facility])     HYSTERECTOMY, LEODAN  10/03/11    s/p LEODAN/BSO to remove pelvic mass; pathology:  Serous cystadenofibroma, 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     OPEN REDUCTION INTERNAL FIXATION ANKLE  7/11/2014    Procedure: OPEN REDUCTION INTERNAL FIXATION ANKLE;  Surgeon: Emmanuel Gomez MD;  Location:  OR     OPEN REDUCTION INTERNAL FIXATION HIP NAILING Right 7/20/2016    Procedure: OPEN REDUCTION INTERNAL FIXATION HIP NAILING;  Surgeon: Jeronimo Giordano MD;  Location:  OR     RECTAL SURGERY  1971    anterior laproscopic repair of sigmoid prolapse     SMALL INTESTINE SURGERY       STRESS ECHO (METRO)  4/98    stress echo (negative)     SURGICAL HISTORY OF -   3/12/05    attempted angioplasty/stent of right middle cerebral artery ( M1 segment), no effective       Current Medications:  ---------------------------  Current Outpatient Prescriptions   Medication Sig Dispense Refill     acetaminophen (TYLENOL) 500 MG tablet Take 500-1,000 mg by mouth every 6 hours as needed        amoxicillin (AMOXIL) 500 MG capsule TAKE 4 CAPSULES BY MOUTH FOR 1 TIME DOSE 1 HOUR PRIOR TO DENTAL PROCEDURE 4 capsule 2     atenolol (TENORMIN) 50 MG tablet Take 1 tablet (50 mg) by mouth daily 90  tablet 3     carbamide peroxide (DEBROX) 6.5 % otic solution Place 5-10 drops into both ears 2 times daily 30 mL 0     cholecalciferol 2000 UNITS CAPS Take 2 capsules by mouth 2 times daily        clopidogrel (PLAVIX) 75 MG tablet Take 1 tablet (75 mg) by mouth daily 90 tablet 3     loratadine (CLARITIN) 10 MG tablet Take 10 mg by mouth daily       lovastatin (MEVACOR) 40 MG tablet Take 1 tablet (40 mg) by mouth At Bedtime 90 tablet 3     Multiple Vitamins-Minerals (PRESERVISION AREDS) CAPS Take 1 capsule by mouth 2 times daily          Allergies:  -------------  Allergies   Allergen Reactions     Atorvastatin Calcium Cramps     Leg cramps     Lisinopril Swelling and Rash     Seasonal Allergies        Social History:  -------------------  Social History     Social History     Marital status: Single     Spouse name: N/A     Number of children: 0     Years of education: N/A     Occupational History     [employer]     [employer]     Social History Main Topics     Smoking status: Former Smoker     Quit date: 1948     Smokeless tobacco: Never Used     Alcohol use Yes      Comment: rare     Drug use: No     Sexual activity: No     Other Topics Concern     Not on file     Social History Narrative    Living arrangements - the patient lives with her friend, Kamini       Family Medical History:  ------------------------------  Family History   Problem Relation Age of Onset     Cancer Mother       of colon ca age 89     Cancer - colorectal Mother      Cardiovascular Father       at age 65     Cancer Brother       of lung ca age 65         ROS:  REVIEW OF SYSTEMS:    RESP: negative for cough, dyspnea, wheezing, hemoptysis  CV: negative for chest pain, palpitations, PND, FENG, orthopnea  GI: negative for dysphagia, N/V, melena, diarrhea and constipation  NEURO: \POS for aphaisa since stroke, POS for persistent right-sided weakness since stroke    OBJECTIVE:                                                     /80  Pulse 70  Temp 97.8  F (36.6  C) (Oral)  Wt 128 lb (58.1 kg)  SpO2 95%  BMI 25 kg/m2     GENERAL alert and no distress, expressive aphasia, speech dysarthric (at same chronic degree since stroke)  EYES:  Normal sclera,conjunctiva, EOMI  EAR:  Left canalo minimal wax, remvoed with loop  rigth canal larger amount.   right ear canal shows thick wax occluding most of the ear canal, unable to fully see TM.  The wax was removed with a combination of loop removal by MD and water irrigation.   Canal was normal after, TM normal.  Pt tolerated well.   HENT: oral and posterior pharynx without lesions or erythema, facies symmetric  NECK: Neck supple. No LAD, without thyroidmegaly or JVD., Carotids without bruits.  RESP: Clear to ausculation bilaterally without wheezes or crackles. Normal BS in all fields.  ABD:  Soft, nromal Bowel sounds  CV: RRR normal S1S2 without murmurs, rubs or gallops. PMI normal  LYMPH: no cervical lymph adenopathy appreciated  EXT:  No lower extremity edema   SKIN:  No obvious significant skin lesions on visible portions of face          ASSESSMENT/PLAN:                                                      (I10) Benign essential hypertension  (primary encounter diagnosis)  Comment: This condition is currently controlled on the current medical regimen.  Continue current therapy.   Discussed current hypertension treatment guidelines, including indications for treatment and treatment options.  Discussed the importance for aggressive management of HTN to prevent vascular complications later.  Recommended lower fat, lower carbohydrate, and lower sodium (<2000 mg)diet.  Discussed required intervals for follow up on HTN, lab studies.  Recommened pt. follow their blood pressures outside the clinic to ensure that BPs are remaining within guidelines, and to contact me if the readings are not within guidelines on a regular basis so we can adjust treatment as needed.   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  Continue current therapy. No new neurological changes.   usign wheelchair for longer transporatation.   Discussed secondary risk factor modification and recommended continuing aggressive management of these items.   Plan:     (E78.5) Hyperlipidemia LDL goal <100  Comment: Discussed new guidelines recommending a statin cholesterol lowering medication for any patient with either diabetes and/or vascular disease, aiming for a LDL goal under 100 for sure, ideally under 70.    Reviewed statins and their side effects including muscle pain, muscle inflammation, GI upset.  Told the patient to stop the medication in question and to call if any side effects develop.   Recommended CoQ10 200-300 mg at the same time as taking the statin medication to help reduce the chance of muscle side effects from the statin.    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  Continue current therapy.   Plan:     (I69.920) Aphasia, late effect of cerebrovascular disease  Comment: This condition is currently controlled on the current medical regimen.  Continue current therapy.   Plan:        See Patient Instructions    TONY SINGH M.D., MD  [facility]

## 2019-01-18 ENCOUNTER — OFFICE VISIT (OUTPATIENT)
Dept: URGENT CARE | Facility: URGENT CARE | Age: 84
End: 2019-01-18
Payer: MEDICARE

## 2019-01-18 VITALS
BODY MASS INDEX: 25 KG/M2 | SYSTOLIC BLOOD PRESSURE: 122 MMHG | HEART RATE: 66 BPM | OXYGEN SATURATION: 99 % | DIASTOLIC BLOOD PRESSURE: 80 MMHG | WEIGHT: 128 LBS

## 2019-01-18 DIAGNOSIS — H61.23 BILATERAL IMPACTED CERUMEN: Primary | ICD-10-CM

## 2019-01-18 DIAGNOSIS — H91.93 DIMINISHED HEARING, BILATERAL: ICD-10-CM

## 2019-01-18 PROCEDURE — 69209 REMOVE IMPACTED EAR WAX UNI: CPT | Performed by: FAMILY MEDICINE

## 2019-01-18 PROCEDURE — 99213 OFFICE O/P EST LOW 20 MIN: CPT | Mod: 25 | Performed by: FAMILY MEDICINE

## 2019-01-18 NOTE — PROGRESS NOTES
Chief Complaint   Patient presents with     Urgent Care     Cerumen Impaction      sxs unable to hear possible ear wax      SUBJECTIVE:   Steffany Brown is a 88 year old female presenting with a chief complaint of impacted wax.  Patient does wear hearing aids and has been noticing diminished hearing in both ears.  She denies any ear pain or any other health concerns.  She is here with her caregiver today. She is an established patient of [facility].  Onset of symptoms was several  day(s) ago.  Course of illness is worsening.    Severity moderate  Current and Associated symptoms: Impacted wax  Treatment measures tried include None tried.  Predisposing factors include None.    Past Medical History:   Diagnosis Date     Age-related macular degeneration, dry, both eyes 10/24/14    per [facility]     Cerebral infarction (H)     aphasia and chronic right sided waekness     Diverticulosis of colon (without mention of hemorrhage)     Several diverticuli seen on colonoscopy     Eczema      Family history of colon cancer     mother  from colon cancer age 89     Hip fracture, intertrochanteric (H) 9/10/09    left hip fracture, S/P ORIF     History of small bowel obstruction ,      Osteoporosis, unspecified      Other and unspecified hyperlipidemia      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     Other speech disturbance 3/05    chronic dysarthria since CVI     Pelvic mass in female 10/3/11    s/p LEODAN/BSO; pathology:  Serous cystadenofibroma, 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     Personal history of colonic polyps      Psoriasis      Rheumatoid arthritis(714.0)      Unspecified essential hypertension      Current Outpatient Medications   Medication Sig Dispense Refill     acetaminophen (TYLENOL) 500 MG tablet Take 500-1,000 mg by mouth every 6 hours as needed        amoxicillin (AMOXIL) 500 MG capsule TAKE 4 CAPSULES  BY MOUTH FOR 1 TIME DOSE 1 HOUR PRIOR TO DENTAL PROCEDURE 4 capsule 2     atenolol (TENORMIN) 50 MG tablet Take 1 tablet (50 mg) by mouth daily 90 tablet 3     carbamide peroxide (DEBROX) 6.5 % otic solution Place 5-10 drops into both ears 2 times daily 30 mL 0     cholecalciferol 2000 UNITS CAPS Take 2 capsules by mouth 2 times daily        clopidogrel (PLAVIX) 75 MG tablet Take 1 tablet (75 mg) by mouth daily 90 tablet 3     loratadine (CLARITIN) 10 MG tablet Take 10 mg by mouth daily       lovastatin (MEVACOR) 40 MG tablet Take 1 tablet (40 mg) by mouth At Bedtime 90 tablet 3     Multiple Vitamins-Minerals (PRESERVISION AREDS) CAPS Take 1 capsule by mouth 2 times daily        Social History     Tobacco Use     Smoking status: Former Smoker     Last attempt to quit: 1948     Years since quittin.0     Smokeless tobacco: Never Used   Substance Use Topics     Alcohol use: Yes     Comment: rare     Family History   Problem Relation Age of Onset     Cancer Mother          of colon ca age 89     Cancer - colorectal Mother      Cardiovascular Father          at age 65     Cancer Brother          of lung ca age 65         ROS:    10 point ROS of systems including Constitutional, Eyes, Respiratory, Cardiovascular, Gastroenterology, Genitourinary, Integumentary, Muscularskeletal, Psychiatric were all negative except for pertinent positives noted in my HPI       OBJECTIVE:  /80   Pulse 66   Wt 58.1 kg (128 lb)   SpO2 99%   BMI 25.00 kg/m    GENERAL APPEARANCE: healthy, alert and no distress  EYES: EOMI,  PERRL, conjunctiva clear  HENT: ear canals and TM's normal after wax removal .  Nose and mouth without ulcers, erythema or lesions  NECK: supple, nontender, no lymphadenopathy  RESP: lungs clear to auscultation - no rales, rhonchi or wheezes  CV: regular rates and rhythm, normal S1 S2, no murmur noted  ABDOMEN:  soft, nontender, no HSM or masses and bowel sounds normal  SKIN: no suspicious  lesions or rashes  Physical Exam            ASSESSMENT:  Steffany was seen today for urgent care and cerumen impaction.    Diagnoses and all orders for this visit:    Bilateral impacted cerumen  -     HC REMOVAL IMPACTED CERUMEN IRRIGATION/LVG UNILAT    Diminished hearing, bilateral          PLAN:  Patient tolerated well the wax removal irrigation  The hearing did improve after removal of the wax.  Discussed with patient to use over-the-counter Debrox to help with the symptoms  Follow up if  symptoms fail to improve or worsens   Pt understood and agreed with plan       Alyson Jeter MD     See orders in Epic

## 2019-06-02 DIAGNOSIS — E78.5 HYPERLIPIDEMIA LDL GOAL <100: ICD-10-CM

## 2019-06-02 NOTE — TELEPHONE ENCOUNTER
"Requested Prescriptions   Pending Prescriptions Disp Refills     lovastatin (MEVACOR) 40 MG tablet [Pharmacy Med Name: LOVASTATIN 40MG TABS] 90 tablet 3     Sig: TAKE ONE TABLET BY MOUTH EVERY NIGHT AT BEDTIME   Last Written Prescription Date:  5/24/2018  Last Fill Quantity: 90,  # refills: 3   Last Office Visit: 11/13/2018   Future Office Visit:         Statins Protocol Failed - 6/2/2019  1:02 PM        Failed - LDL on file in past 12 months     Recent Labs   Lab Test 05/22/18  0744   LDL 61             Passed - No abnormal creatine kinase in past 12 months     No lab results found.             Passed - Recent (12 mo) or future (30 days) visit within the authorizing provider's specialty     Patient had office visit in the last 12 months or has a visit in the next 30 days with authorizing provider or within the authorizing provider's specialty.  See \"Patient Info\" tab in inbasket, or \"Choose Columns\" in Meds & Orders section of the refill encounter.              Passed - Medication is active on med list        Passed - Patient is age 18 or older        Passed - No active pregnancy on record        Passed - No positive pregnancy test in past 12 months          "

## 2019-06-04 RX ORDER — LOVASTATIN 40 MG
TABLET ORAL
Qty: 90 TABLET | Refills: 0 | Status: SHIPPED | OUTPATIENT
Start: 2019-06-04 | End: 2019-06-21

## 2019-06-04 NOTE — TELEPHONE ENCOUNTER
Prescription approved per [facility] Refill Protocol.    Outgoing call to pt, spoke to her sister, and informed that pt is due for annual exam. Sister wants to schedule annual exam as well with Dr. Hancock. They will call back to schedule appt's at the same time.

## 2019-06-10 ENCOUNTER — DOCUMENTATION ONLY (OUTPATIENT)
Dept: INTERNAL MEDICINE | Facility: CLINIC | Age: 84
End: 2019-06-10

## 2019-06-10 DIAGNOSIS — E78.5 HYPERLIPIDEMIA LDL GOAL <100: ICD-10-CM

## 2019-06-10 DIAGNOSIS — I10 BENIGN ESSENTIAL HYPERTENSION: ICD-10-CM

## 2019-06-10 DIAGNOSIS — M06.9 RHEUMATOID ARTHRITIS INVOLVING MULTIPLE SITES, UNSPECIFIED RHEUMATOID FACTOR PRESENCE: Primary | ICD-10-CM

## 2019-06-17 DIAGNOSIS — I10 BENIGN ESSENTIAL HYPERTENSION: ICD-10-CM

## 2019-06-17 DIAGNOSIS — M06.9 RHEUMATOID ARTHRITIS INVOLVING MULTIPLE SITES, UNSPECIFIED RHEUMATOID FACTOR PRESENCE: ICD-10-CM

## 2019-06-17 DIAGNOSIS — E78.5 HYPERLIPIDEMIA LDL GOAL <100: ICD-10-CM

## 2019-06-17 LAB
ALBUMIN SERPL-MCNC: 3.6 G/DL (ref 3.4–5)
ALP SERPL-CCNC: 103 U/L (ref 40–150)
ALT SERPL W P-5'-P-CCNC: 15 U/L (ref 0–50)
ANION GAP SERPL CALCULATED.3IONS-SCNC: 7 MMOL/L (ref 3–14)
AST SERPL W P-5'-P-CCNC: 21 U/L (ref 0–45)
BILIRUB SERPL-MCNC: 0.5 MG/DL (ref 0.2–1.3)
BUN SERPL-MCNC: 7 MG/DL (ref 7–30)
CALCIUM SERPL-MCNC: 9.4 MG/DL (ref 8.5–10.1)
CHLORIDE SERPL-SCNC: 97 MMOL/L (ref 94–109)
CHOLEST SERPL-MCNC: 170 MG/DL
CO2 SERPL-SCNC: 26 MMOL/L (ref 20–32)
CREAT SERPL-MCNC: 0.6 MG/DL (ref 0.52–1.04)
ERYTHROCYTE [DISTWIDTH] IN BLOOD BY AUTOMATED COUNT: 11.6 % (ref 10–15)
GFR SERPL CREATININE-BSD FRML MDRD: 81 ML/MIN/{1.73_M2}
GLUCOSE SERPL-MCNC: 106 MG/DL (ref 70–99)
HCT VFR BLD AUTO: 43.9 % (ref 35–47)
HDLC SERPL-MCNC: 62 MG/DL
HGB BLD-MCNC: 14.8 G/DL (ref 11.7–15.7)
LDLC SERPL CALC-MCNC: 83 MG/DL
MCH RBC QN AUTO: 31.6 PG (ref 26.5–33)
MCHC RBC AUTO-ENTMCNC: 33.7 G/DL (ref 31.5–36.5)
MCV RBC AUTO: 94 FL (ref 78–100)
NONHDLC SERPL-MCNC: 108 MG/DL
PLATELET # BLD AUTO: 378 10E9/L (ref 150–450)
POTASSIUM SERPL-SCNC: 4.3 MMOL/L (ref 3.4–5.3)
PROT SERPL-MCNC: 7.8 G/DL (ref 6.8–8.8)
RBC # BLD AUTO: 4.69 10E12/L (ref 3.8–5.2)
SODIUM SERPL-SCNC: 130 MMOL/L (ref 133–144)
TRIGL SERPL-MCNC: 124 MG/DL
WBC # BLD AUTO: 12.9 10E9/L (ref 4–11)

## 2019-06-17 PROCEDURE — 85027 COMPLETE CBC AUTOMATED: CPT | Performed by: INTERNAL MEDICINE

## 2019-06-17 PROCEDURE — 80053 COMPREHEN METABOLIC PANEL: CPT | Performed by: INTERNAL MEDICINE

## 2019-06-17 PROCEDURE — 36415 COLL VENOUS BLD VENIPUNCTURE: CPT | Performed by: INTERNAL MEDICINE

## 2019-06-17 PROCEDURE — 80061 LIPID PANEL: CPT | Performed by: INTERNAL MEDICINE

## 2019-06-21 ENCOUNTER — OFFICE VISIT (OUTPATIENT)
Dept: INTERNAL MEDICINE | Facility: CLINIC | Age: 84
End: 2019-06-21
Payer: MEDICARE

## 2019-06-21 VITALS
WEIGHT: 128 LBS | BODY MASS INDEX: 25.13 KG/M2 | DIASTOLIC BLOOD PRESSURE: 80 MMHG | TEMPERATURE: 97.7 F | HEIGHT: 60 IN | SYSTOLIC BLOOD PRESSURE: 110 MMHG | HEART RATE: 60 BPM | OXYGEN SATURATION: 92 %

## 2019-06-21 DIAGNOSIS — M06.9 RHEUMATOID ARTHRITIS INVOLVING MULTIPLE SITES, UNSPECIFIED RHEUMATOID FACTOR PRESENCE: ICD-10-CM

## 2019-06-21 DIAGNOSIS — E22.2 SIADH (SYNDROME OF INAPPROPRIATE ADH PRODUCTION) (H): ICD-10-CM

## 2019-06-21 DIAGNOSIS — I69.959 HEMIPLEGIA OF DOMINANT SIDE AS LATE EFFECT FOLLOWING CEREBROVASCULAR DISEASE (H): ICD-10-CM

## 2019-06-21 DIAGNOSIS — H61.23 BILATERAL IMPACTED CERUMEN: ICD-10-CM

## 2019-06-21 DIAGNOSIS — H35.3130 BILATERAL NONEXUDATIVE AGE-RELATED MACULAR DEGENERATION, UNSPECIFIED STAGE: ICD-10-CM

## 2019-06-21 DIAGNOSIS — E78.5 HYPERLIPIDEMIA LDL GOAL <100: ICD-10-CM

## 2019-06-21 DIAGNOSIS — I10 BENIGN ESSENTIAL HYPERTENSION: Primary | ICD-10-CM

## 2019-06-21 DIAGNOSIS — Z79.2 PROPHYLACTIC ANTIBIOTIC: ICD-10-CM

## 2019-06-21 DIAGNOSIS — I69.920 APHASIA, LATE EFFECT OF CEREBROVASCULAR DISEASE: ICD-10-CM

## 2019-06-21 PROCEDURE — 99214 OFFICE O/P EST MOD 30 MIN: CPT | Mod: 25 | Performed by: INTERNAL MEDICINE

## 2019-06-21 PROCEDURE — 69210 REMOVE IMPACTED EAR WAX UNI: CPT | Performed by: INTERNAL MEDICINE

## 2019-06-21 RX ORDER — LOVASTATIN 40 MG
40 TABLET ORAL AT BEDTIME
Qty: 90 TABLET | Refills: 3 | Status: SHIPPED | OUTPATIENT
Start: 2019-06-21 | End: 2020-07-01

## 2019-06-21 RX ORDER — AMOXICILLIN 500 MG/1
CAPSULE ORAL
Qty: 4 CAPSULE | Refills: 2 | Status: SHIPPED | OUTPATIENT
Start: 2019-06-21 | End: 2021-03-03

## 2019-06-21 RX ORDER — CLOPIDOGREL BISULFATE 75 MG/1
75 TABLET ORAL DAILY
Qty: 90 TABLET | Refills: 3 | Status: SHIPPED | OUTPATIENT
Start: 2019-06-21 | End: 2020-07-14

## 2019-06-21 RX ORDER — ATENOLOL 50 MG/1
50 TABLET ORAL DAILY
Qty: 90 TABLET | Refills: 3 | Status: SHIPPED | OUTPATIENT
Start: 2019-06-21 | End: 2020-07-22

## 2019-06-21 ASSESSMENT — MIFFLIN-ST. JEOR: SCORE: 932.1

## 2019-06-21 NOTE — PATIENT INSTRUCTIONS
"*  Continue all medications at the same doses.  Contact your usual pharmacy if you need refills.     *  Return to see me in 6 months, sooner if needed.  Please get fasting labs done at the [facility] or any [facility] 1-2 days before this appointment (schedule a \"lab appointment\").  If you get the labs done at another clinic, make arrangements with them directly.  The orders will be in place.  Eat nothing for at least 8 hours prior to having these labs drawn.  Use [employer] or Call [phone] to schedule the appointment with me and lab appointment.       SHINGLES VACCINE:        I would recommend that you consider getting a \"shingles vaccine\".  The shingles vaccine does not stop you from getting shingles, but it decreases the intensity of the event, the duration of the event, and decreases the painful nerve condition that results     There are two options available for shingles vaccines:     --Shingrix: 2 shots, give 2-6 months apart  **recommended**   OR   --Zostavax, one shot       Based on the available data, the Shingrix vaccine has superior benefit and should be considered even if you have had the Zostavax vaccine before.         Contact your insurance provider and ask them if either shingles vaccine is covered and is so, how much it will cost you.  Usually this will be cheaper to get in a pharmacy given by the pharmacist.       Regardless of the coverage, I would recommend that you consider the vaccine since shingles is very painful, (just ask anyone who has ever had it).          5 GOALS TO PREVENT VASCULAR DISEASE:     1.  Aggressive blood pressure control, under 130/80 ideally.  Using medications if needed.    Your blood pressure is under good control    BP Readings from Last 4 Encounters:   06/21/19 110/80   01/18/19 122/80   11/13/18 118/80   10/25/18 (!) 179/97       2.  Aggressive LDL cholesterol (\"bad cholesterol\") lowering as indicated.    Your goal is an LDL under 130 " "for sure, preferably under 100.  (If you have diabetes or previous vascular disease, the the LDL goals would be under 100 for sure, preferably under 70.)    New guidelines identify four high-risk groups who could benefit from statins:   *people with pre-existing heart disease, such as those who have had a heart attack;   *people ages 40 to 75 who have diabetes of any type  *patients ages 40 to 75 with at least a 7.5% risk of developing cardiovascular disease over the next decade, according to a formula described in the guidelines  *patients with the sort of super-high cholesterol that sometimes runs in families, as evidenced by an LDL of 190 milligrams per deciliter or higher    Your cholesterol levels are well controlled.    Recent Labs   Lab Test 06/17/19  0823 05/22/18  0744  05/12/15  0742 05/19/14  0741   CHOL 170 128   < > 153 161   HDL 62 48*   < > 60 65   LDL 83 61   < > 71 81   TRIG 124 97   < > 111 77   CHOLHDLRATIO  --   --   --  2.6 2.5    < > = values in this interval not displayed.       3.  Aggressive diabetic prevention, screening and/or management.      You do not have diabetes as of the most recent blood tests.     4.  No smoking    5.  Consider Daily aspirin: Have a discussion about the relative benefits and risks of taking daily low dose aspirin (81 mg) tablet once per day over the age of 50, unless there is a specific reason that you cannot take aspirin (such as side effect, allergy, or you are on another \"blood thinner\").        --Based on your current cardiac risk factors, you should NOT take regular daily Aspirin because you are on PLavix.      --continue the Plavix unless you have side effects from this, like excessive bruising or bleeding.          CERUMEN IMPACTION (EXCESSIVE EAR WAX BUILD UP):       *  You had a fair amount of wax in the ear canal.     *  Wax softening ear drops (e.g. Debrox or mineral oil) into the affected ear canal, let it sit for 15 minutes, then irrigate with the " "syringe as below.     *  In the future, never use Q-tips or swabs to try and clean out your ear canal as you will risk injury to the ear canal or ear drum and you will do nothing more than just push the wax in deeper into the erar canal.     *  If you have future problems with ear wax build up, you may use a \"bulb syringe\" from any drug store and perform your own ear canal washes every 2-3 months if needed.                "

## 2019-06-21 NOTE — PROGRESS NOTES
Subjective     Steffany Brown is a 88 year old female who presents to clinic today for the following health issues:    HPI   Hyperlipidemia Follow-Up      Are you having any of the following symptoms? (Select all that apply)  No complaints of shortness of breath, chest pain or pressure.  No increased sweating or nausea with activity.  No left-sided neck or arm pain.  No complaints of pain in calves when walking 1-2 blocks.    Are you regularly taking any medication or supplement to lower your cholesterol?   Yes- lovastatin    Are you having muscle aches or other side effects that you think could be caused by your cholesterol lowering medication?  No      Hypertension Follow-up      Do you check your blood pressure regularly outside of the clinic? No     Are you following a low salt diet? Yes    Are your blood pressures ever more than 140 on the top number (systolic) OR more   than 90 on the bottom number (diastolic), for example 140/90? No    Amount of exercise or physical activity: None    Problems taking medications regularly: No    Medication side effects: none    Diet: regular (no restrictions)        3.  history of stroke with residual right sided weakness and expressive aphasias.   Had stroke despite ASA use.   Has been on plavix since that time withanurag mccray side effects         Reviewed and updated as needed this visit by Provider         Review of Systems         Objective    There were no vitals taken for this visit.  There is no height or weight on file to calculate BMI.  Physical Exam                 Past Medical History:  ---------------------------  Past Medical History:   Diagnosis Date     Age-related macular degeneration, dry, both eyes 10/24/14    per [facility] specialists     Cerebral infarction (H)     aphasia and chronic right sided waekness     Diverticulosis of colon (without mention of hemorrhage) 5/03    Several diverticuli seen on colonoscopy     Eczema      Family history of colon  cancer     mother  from colon cancer age 89     Hip fracture, intertrochanteric (H) 9/10/09    left hip fracture, S/P ORIF     History of small bowel obstruction ,      Osteoporosis, unspecified      Other and unspecified hyperlipidemia      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     Other speech disturbance 3/05    chronic dysarthria since CVI     Pelvic mass in female 10/3/11    s/p LEODAN/BSO; pathology:  Serous cystadenofibroma, 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     Personal history of colonic polyps      Psoriasis      Rheumatoid arthritis(714.0)      Unspecified essential hypertension        Past Surgical History:  ---------------------------  Past Surgical History:   Procedure Laterality Date     C APPENDECTOMY       COLONOSCOPY  03    Diverticulosis, single small polyp removed (at [facility])     COLONOSCOPY  08    diverticulosis, few scattered mild angioectasias, no polyps     COLONOSCOPY  2013    Procedure: COLONOSCOPY;  COLONOSCOPY ;  Surgeon: Romario Watters MD;  Location:  GI     D & C       FRACTURE TX, HIP RT/LT  9/10/09    Open reduction with IM nailing of left hip fracture using a gamma nail     HC COLONOSCOPY THRU STOMA W BIOPSY/CAUTERY TUMOR/POLYP/LESION  3/00    colonoscopy adenamatous polyp ([facility])     HYSTERECTOMY, LEODAN  10/03/11    s/p LEODAN/BSO to remove pelvic mass; pathology:  Serous cystadenofibroma, 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     OPEN REDUCTION INTERNAL FIXATION ANKLE  2014    Procedure: OPEN REDUCTION INTERNAL FIXATION ANKLE;  Surgeon: Emmanuel Gomez MD;  Location:  OR     OPEN REDUCTION INTERNAL FIXATION HIP NAILING Right 2016    Procedure: OPEN REDUCTION INTERNAL FIXATION HIP NAILING;  Surgeon: Jeronimo Giordano MD;  Location:  OR     RECTAL SURGERY  1971    anterior laproscopic repair of sigmoid prolapse     SMALL INTESTINE SURGERY        STRESS ECHO ([facility])  4/98    stress echo (negative)     SURGICAL HISTORY OF -   3/12/05    attempted angioplasty/stent of right middle cerebral artery ( M1 segment), no effective       Current Medications:  ---------------------------  Current Outpatient Medications   Medication Sig Dispense Refill     acetaminophen (TYLENOL) 500 MG tablet Take 500-1,000 mg by mouth every 6 hours as needed        amoxicillin (AMOXIL) 500 MG capsule TAKE 4 CAPSULES BY MOUTH FOR 1 TIME DOSE 1 HOUR PRIOR TO DENTAL PROCEDURE 4 capsule 2     atenolol (TENORMIN) 50 MG tablet Take 1 tablet (50 mg) by mouth daily 90 tablet 3     carbamide peroxide (DEBROX) 6.5 % otic solution Place 5-10 drops into both ears 2 times daily 30 mL 0     cholecalciferol 2000 UNITS CAPS Take 2 capsules by mouth 2 times daily        clopidogrel (PLAVIX) 75 MG tablet Take 1 tablet (75 mg) by mouth daily 90 tablet 3     loratadine (CLARITIN) 10 MG tablet Take 10 mg by mouth daily       lovastatin (MEVACOR) 40 MG tablet Take 1 tablet (40 mg) by mouth At Bedtime 90 tablet 3     Multiple Vitamins-Minerals (PRESERVISION AREDS) CAPS Take 1 capsule by mouth 2 times daily          Allergies:  -------------  Allergies   Allergen Reactions     Atorvastatin Calcium Cramps     Leg cramps     Lisinopril Swelling and Rash     Seasonal Allergies        Social History:  -------------------  Social History     Socioeconomic History     Marital status: Single     Spouse name: Not on file     Number of children: 0     Years of education: Not on file     Highest education level: Not on file   Occupational History     Occupation:      Employer: OTHER     Comment: [employer]   Social Needs     Financial resource strain: Not on file     Food insecurity:     Worry: Not on file     Inability: Not on file     Transportation needs:     Medical: Not on file     Non-medical: Not on file   Tobacco Use     Smoking status: Former Smoker     Last attempt to quit: 1/1/1948      Years since quittin.5     Smokeless tobacco: Never Used   Substance and Sexual Activity     Alcohol use: Yes     Comment: rare     Drug use: No     Sexual activity: Never   Lifestyle     Physical activity:     Days per week: Not on file     Minutes per session: Not on file     Stress: Not on file   Relationships     Social connections:     Talks on phone: Not on file     Gets together: Not on file     Attends [employer] service: Not on file     Active member of club or organization: Not on file     Attends meetings of clubs or organizations: Not on file     Relationship status: Not on file     Intimate partner violence:     Fear of current or ex partner: Not on file     Emotionally abused: Not on file     Physically abused: Not on file     Forced sexual activity: Not on file   Other Topics Concern     Parent/sibling w/ CABG, MI or angioplasty before 65F 55M? Not Asked   Social History Narrative    Living arrangements - the patient lives with her friendKamini       Family Medical History:  ------------------------------  Family History   Problem Relation Age of Onset     Cancer Mother          of colon ca age 89     Cancer - colorectal Mother      Cardiovascular Father          at age 65     Cancer Brother          of lung ca age 65         ROS:  REVIEW OF SYSTEMS:    RESP: negative for cough, dyspnea, wheezing, hemoptysis  CV: negative for chest pain, palpitations, PND, FENG, orthopnea; reports no significant changes in their ability to perform physical activity (from cardiovascular standpoint)  GI: negative for dysphagia, N/V, pain, melena, diarrhea and constipation  NEURO: negative for new numbness/tingling, paralysis, incoordination, or focal weakness     OBJECTIVE:                                                    /80   Pulse 60   Temp 97.7  F (36.5  C) (Temporal)   Ht 1.524 m (5')   Wt 58.1 kg (128 lb)   SpO2 92%   BMI 25.00 kg/m       GENERAL alert and no distress  EYES:   Normal sclera,conjunctiva, EOMI  EARS:  both ear canals show thick wax, occluding most of the ear canals, unable to fully see TMs.  The wax was removed from both ear canals with a combination of loop removal by MD and water irrigation.   Canals were normal after, TMs normal.  Pt tolerated well   HENT: oral and posterior pharynx without lesions or erythema, facies symmetric  NECK: Neck supple. No LAD, without thyroidmegaly.  RESP: Clear to ausculation bilaterally without wheezes or crackles. Normal BS in all fields.  CV: RRR normal S1S2 without murmurs, rubs or gallops.  LYMPH: no cervical lymph adenopathy appreciated  MS: extremities- severe osteoarthritis right knee with valgus deformity  EXT:  no lower extremity edema  PSYCH: Alert and oriented times 3; speech- mumbles some words, expressive aphasia, follows commands           ASSESSMENT/PLAN:                                                      (I10) Benign essential hypertension  (primary encounter diagnosis)  Comment: This condition is currently controlled on the current medical regimen.  Continue current therapy.  Discussed current hypertension treatment guidelines, including indications for treatment and treatment options.  Discussed the importance for aggressive management of HTN to prevent vascular complications later.  Recommended lower fat, lower carbohydrate, and lower sodium (<2000 mg)diet.  Discussed required intervals for follow up on HTN, lab studies.  Recommened pt. follow their blood pressures outside the clinic to ensure that BPs are remaining within guidelines, and to contact me if the readings are not within guidelines on a regular basis so we can adjust treatment as needed.   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  Continue current therapy.   Discussed side effects from plavix, she dnies.   Plan: clopidogrel (PLAVIX) 75 MG tablet            (I69.920) Aphasia, late effect of cerebrovascular disease  Comment: This condition is currently controlled on the current medical regimen.  Continue current therapy.   Plan:     (M06.9) Rheumatoid arthritis involving multiple sites, unspecified rheumatoid factor presence (H)  Comment: This condition is currently controlled on the current medical regimen.  Continue current therapy.   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  Continue current therapy.   Plan:     (E78.5) Hyperlipidemia LDL goal <100  Comment: Discussed guidelines recommending a statin cholesterol lowering medication for any patient with either diabetes and/or vascular disease, aiming for a LDL goal under 100 for sure, ideally under 70.    Reviewed statins and their side effects including muscle pain, muscle inflammation, GI upset.  Told the patient to stop the medication in question and to call if any side effects develop.   Recommended CoQ10 200-300 mg at the same time as taking the statin medication to help reduce the chance of muscle side effects from the statin.    Plan: lovastatin (MEVACOR) 40 MG tablet,         Comprehensive metabolic panel, CBC with         platelets            (H35.3130) Bilateral nonexudative age-related macular degeneration, unspecified stage  Comment: folloewd by Ophtho.   Plan:     (Z79.2) Prophylactic antibiotic  Comment: before dental procedures.   Plan: amoxicillin (AMOXIL) 500 MG capsule            (H61.23) Bilateral impacted cerumen  Comment: Impacted cerumen was removed with a combination of loop removal by MD and water irrigation.  Canal and TM mostly clear afterwards.  Right canal has always been considerably smaller than the left.   Discussed strategies for preventing  future ear wax buildups.   Plan: REMOVE IMPACTED CERUMEN               See Patient Instructions    TONY SINGH M.D., MD  [facility]    (Chart documentation may have been completed, in part, with [employer] voice-recognition software. Even though reviewed, some grammatical, spelling, and word errors may remain.)

## 2019-07-22 ENCOUNTER — MYC MEDICAL ADVICE (OUTPATIENT)
Dept: INTERNAL MEDICINE | Facility: CLINIC | Age: 84
End: 2019-07-22

## 2019-07-22 NOTE — TELEPHONE ENCOUNTER
Per chart review, it looks like the patient was seen on 6/21/19 which is just outside of the 30-day window that home care requires the patient to be seen within.    Can you please notify patient/family that she would need an office visit with PCP and then a home care referral can be placed at that time?    Thank you!

## 2019-07-24 ENCOUNTER — OFFICE VISIT (OUTPATIENT)
Dept: INTERNAL MEDICINE | Facility: CLINIC | Age: 84
End: 2019-07-24
Payer: MEDICARE

## 2019-07-24 VITALS
TEMPERATURE: 97.7 F | WEIGHT: 128 LBS | SYSTOLIC BLOOD PRESSURE: 122 MMHG | BODY MASS INDEX: 25 KG/M2 | OXYGEN SATURATION: 93 % | DIASTOLIC BLOOD PRESSURE: 80 MMHG | HEART RATE: 66 BPM

## 2019-07-24 DIAGNOSIS — M06.9 RHEUMATOID ARTHRITIS INVOLVING MULTIPLE SITES, UNSPECIFIED RHEUMATOID FACTOR PRESENCE: ICD-10-CM

## 2019-07-24 DIAGNOSIS — R29.6 FALLS FREQUENTLY: Primary | ICD-10-CM

## 2019-07-24 DIAGNOSIS — M17.0 PRIMARY OSTEOARTHRITIS OF BOTH KNEES: ICD-10-CM

## 2019-07-24 DIAGNOSIS — I69.959 HEMIPLEGIA OF DOMINANT SIDE AS LATE EFFECT FOLLOWING CEREBROVASCULAR DISEASE (H): ICD-10-CM

## 2019-07-24 DIAGNOSIS — R53.81 PHYSICAL DECONDITIONING: ICD-10-CM

## 2019-07-24 PROCEDURE — 99214 OFFICE O/P EST MOD 30 MIN: CPT | Performed by: INTERNAL MEDICINE

## 2019-07-24 NOTE — PROGRESS NOTES
Subjective     Steffany Brown is a 88 year old female who presents to clinic today for the following health issues:    HPI   Pt fell on Monday, getting out of the shower to get to her wheelchair. No major injuries.    Requesting home care.     Has been gettgin weaker and weakner since her strokes and orthopedic injuries  Relies on her rommmate to most of her ADLs for her.   Unable kylie rise out of chair on her own  Does not walk, uses wheelchair to move around apt.   No new symptoms, hjust getting weaker.     2.  End stage DJD on right knee, unstable and painful has been told not candidate for TKA due to her many comorbidities.     3.    Blood presure remains well controlled at home  Readings outside clinic are within normal limits.  Reviewed last 6 BP readings in chart:  BP Readings from Last 6 Encounters:   19 122/80   19 110/80   19 122/80   18 118/80   10/25/18 (!) 179/97   18 136/76     He has not experienced any significant side effects from medicaiotns for hypertension.    NO active cardiac complaints or symptoms with exercise.     Reviewed and updated as needed this visit by Provider         Review of Systems         Objective    There were no vitals taken for this visit.  There is no height or weight on file to calculate BMI.  Physical Exam                 Past Medical History:  ---------------------------  Past Medical History:   Diagnosis Date     Age-related macular degeneration, dry, both eyes 10/24/14    per vitreoretinal specialists     Cerebral infarction (H)     aphasia and chronic right sided waekness     Diverticulosis of colon (without mention of hemorrhage)     Several diverticuli seen on colonoscopy     Eczema      Family history of colon cancer     mother  from colon cancer age 89     Hip fracture, intertrochanteric (H) 9/10/09    left hip fracture, S/P ORIF     History of small bowel obstruction ,      Osteoporosis, unspecified      Other and  unspecified hyperlipidemia      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     Other speech disturbance 3/05    chronic dysarthria since CVI     Pelvic mass in female 10/3/11    s/p LEODAN/BSO; pathology:  Serous cystadenofibroma, 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     Personal history of colonic polyps 2000     Psoriasis      Rheumatoid arthritis(714.0)      Unspecified essential hypertension        Past Surgical History:  ---------------------------  Past Surgical History:   Procedure Laterality Date     C APPENDECTOMY  1985     COLONOSCOPY  5/14/03    Diverticulosis, single small polyp removed (at [facility])     COLONOSCOPY  6/26/08    diverticulosis, few scattered mild angioectasias, no polyps     COLONOSCOPY  7/17/2013    Procedure: COLONOSCOPY;  COLONOSCOPY ;  Surgeon: Romario Watters MD;  Location:  GI     D [facility] C       FRACTURE TX, HIP RT/LT  9/10/09    Open reduction with IM nailing of left hip fracture using a gamma nail     HC COLONOSCOPY THRU STOMA W BIOPSY/CAUTERY TUMOR/POLYP/LESION  3/00    colonoscopy adenamatous polyp ([facility])     HYSTERECTOMY, LEODAN  10/03/11    s/p LEODAN/BSO to remove pelvic mass; pathology:  Serous cystadenofibroma, 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     OPEN REDUCTION INTERNAL FIXATION ANKLE  7/11/2014    Procedure: OPEN REDUCTION INTERNAL FIXATION ANKLE;  Surgeon: Emmanuel Gomez MD;  Location:  OR     OPEN REDUCTION INTERNAL FIXATION HIP NAILING Right 7/20/2016    Procedure: OPEN REDUCTION INTERNAL FIXATION HIP NAILING;  Surgeon: Jeronimo Giordano MD;  Location:  OR     RECTAL SURGERY  1971    anterior laproscopic repair of sigmoid prolapse     SMALL INTESTINE SURGERY       STRESS ECHO ([facility])  4/98    stress echo (negative)     SURGICAL HISTORY OF -   3/12/05    attempted angioplasty/stent of right middle cerebral artery ( M1 segment), no effective       Current  Medications:  ---------------------------  Current Outpatient Medications   Medication Sig Dispense Refill     acetaminophen (TYLENOL) 500 MG tablet Take 500-1,000 mg by mouth every 6 hours as needed        amoxicillin (AMOXIL) 500 MG capsule TAKE 4 CAPSULES BY MOUTH FOR 1 TIME DOSE 1 HOUR PRIOR TO DENTAL PROCEDURE 4 capsule 2     atenolol (TENORMIN) 50 MG tablet Take 1 tablet (50 mg) by mouth daily 90 tablet 3     carbamide peroxide (DEBROX) 6.5 % otic solution Place 5-10 drops into both ears 2 times daily 30 mL 0     cholecalciferol 2000 UNITS CAPS Take 2 capsules by mouth 2 times daily        clopidogrel (PLAVIX) 75 MG tablet Take 1 tablet (75 mg) by mouth daily 90 tablet 3     loratadine (CLARITIN) 10 MG tablet Take 10 mg by mouth daily       lovastatin (MEVACOR) 40 MG tablet Take 1 tablet (40 mg) by mouth At Bedtime 90 tablet 3     Multiple Vitamins-Minerals (PRESERVISION AREDS) CAPS Take 1 capsule by mouth 2 times daily          Allergies:  -------------  Allergies   Allergen Reactions     Atorvastatin Calcium Cramps     Leg cramps     Lisinopril Swelling and Rash     Seasonal Allergies        Social History:  -------------------  Social History     Socioeconomic History     Marital status: Single     Spouse name: Not on file     Number of children: 0     Years of education: Not on file     Highest education level: Not on file   Occupational History     Occupation:      Employer: OTHER     Comment: [employer]   Social Needs     Financial resource strain: Not on file     Food insecurity:     Worry: Not on file     Inability: Not on file     Transportation needs:     Medical: Not on file     Non-medical: Not on file   Tobacco Use     Smoking status: Former Smoker     Last attempt to quit: 1948     Years since quittin.6     Smokeless tobacco: Never Used   Substance and Sexual Activity     Alcohol use: Yes     Comment: rare     Drug use: No     Sexual activity: Never   Lifestyle      Physical activity:     Days per week: Not on file     Minutes per session: Not on file     Stress: Not on file   Relationships     Social connections:     Talks on phone: Not on file     Gets together: Not on file     Attends Mandaen service: Not on file     Active member of club or organization: Not on file     Attends meetings of clubs or organizations: Not on file     Relationship status: Not on file     Intimate partner violence:     Fear of current or ex partner: Not on file     Emotionally abused: Not on file     Physically abused: Not on file     Forced sexual activity: Not on file   Other Topics Concern     Parent/sibling w/ CABG, MI or angioplasty before 65F 55M? Not Asked   Social History Narrative    Living arrangements - the patient lives with her friendKamini       Family Medical History:  ------------------------------  Family History   Problem Relation Age of Onset     Cancer Mother          of colon ca age 89     Cancer - colorectal Mother      Cardiovascular Father          at age 65     Cancer Brother          of lung ca age 65         ROS:  REVIEW OF SYSTEMS:    RESP: negative for cough, dyspnea, wheezing, hemoptysis  CV: negative for chest pain, palpitations, PND, FENG, orthopnea; reports no significant changes in their ability to perform physical activity (from cardiovascular standpoint)  GI: negative for dysphagia, N/V, pain, melena, diarrhea and constipation  NEURO: negative for new numbness/tingling; POS for chronci hemiplegia sincestroke, dysarthria.  Expressive aphasia.      OBJECTIVE:                                                    /80   Pulse 66   Temp 97.7  F (36.5  C) (Temporal)   Wt 58.1 kg (128 lb)   SpO2 93%   BMI 25.00 kg/m       GENERAL alert and no distress, sitting in wheelchair, barely able to stand on her own, unable to wlak due to weakness.  Expressive aphasia (mild), some chronci dysarhria since stroke.   EYES:  Normal sclera,conjunctiva,  EOMI  HENT: oral and posterior pharynx without lesions or erythema, facies symmetric  NECK: Neck supple. No LAD, without thyroidmegaly.  RESP: Clear to ausculation bilaterally without wheezes or crackles. Normal BS in all fields.  CV: RRR normal S1S2 without murmurs, rubs or gallops.  LYMPH: no cervical lymph adenopathy appreciated  KNEE:  Severe djd righgt knee with swelling, malformation.   Moderate DJD left knee.   Unable to stand up out of chair on her own.   EXT:  no lower extremity edema           ASSESSMENT/PLAN:                                                      (R29.6) Falls frequently  (primary encounter diagnosis)  Comment: had another fall at home  Patient and friend want to see about servcies in home to help with bathing and anything else to help maintain independent living.   Home care referral enetered.   Relies on her friedn and roommate to do most her ADLs.   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   Plan: HOME CARE NURSING REFERRAL            (M06.9) Rheumatoid arthritis involving multiple sites, unspecified rheumatoid factor presence (H)  Comment: This condition is currently controlled on the current medical regimen.  Continue current therapy.   Plan: HOME CARE NURSING REFERRAL            (R53.81) Physical deconditioning  Comment: ramesh isuse, she is unable to walk due to the issues above, only getting weaker.   Unable to rise herself out of chair  Consider lift chair.   Home PT and OT ev als to come.   Plan: HOME CARE NURSING REFERRAL            (M17.0) Primary osteoarthritis of both knees  Comment: has been told by ortho that she is NOT TKA replacement. Due high risk from surgery due to her many comorbidities  Plan: HOME CARE NURSING REFERRAL              See Patient Instructions    TONY SINGH M.D., MD  [facility]  JULIEN MARQUEZ spent greater than 15 minutes with pt , greater than 50% of time was educational and counseling.     (Chart documentation may have been completed, in part, with [employer] voice-recognition software. Even though reviewed, some grammatical, spelling, and word errors may remain.)

## 2019-07-24 NOTE — PATIENT INSTRUCTIONS
*  Home care nursing referral to evaluate for services that can help maintain independence in your home.     *  Home care staff will contact you, a nurse will come out first to evaluate what may be needed, then the Physical therapy and occupational therapists will come out.    Home health aides can be brought in as needed to help with things like bathing.

## 2019-11-11 DIAGNOSIS — I10 BENIGN ESSENTIAL HYPERTENSION: ICD-10-CM

## 2019-11-11 DIAGNOSIS — E78.5 HYPERLIPIDEMIA LDL GOAL <100: ICD-10-CM

## 2019-11-11 LAB
ALBUMIN SERPL-MCNC: 3.6 G/DL (ref 3.4–5)
ALP SERPL-CCNC: 101 U/L (ref 40–150)
ALT SERPL W P-5'-P-CCNC: 16 U/L (ref 0–50)
ANION GAP SERPL CALCULATED.3IONS-SCNC: 7 MMOL/L (ref 3–14)
AST SERPL W P-5'-P-CCNC: 20 U/L (ref 0–45)
BILIRUB SERPL-MCNC: 0.5 MG/DL (ref 0.2–1.3)
BUN SERPL-MCNC: 8 MG/DL (ref 7–30)
CALCIUM SERPL-MCNC: 9.7 MG/DL (ref 8.5–10.1)
CHLORIDE SERPL-SCNC: 95 MMOL/L (ref 94–109)
CO2 SERPL-SCNC: 27 MMOL/L (ref 20–32)
CREAT SERPL-MCNC: 0.68 MG/DL (ref 0.52–1.04)
ERYTHROCYTE [DISTWIDTH] IN BLOOD BY AUTOMATED COUNT: 11.7 % (ref 10–15)
GFR SERPL CREATININE-BSD FRML MDRD: 77 ML/MIN/{1.73_M2}
GLUCOSE SERPL-MCNC: 111 MG/DL (ref 70–99)
HCT VFR BLD AUTO: 43.8 % (ref 35–47)
HGB BLD-MCNC: 14.4 G/DL (ref 11.7–15.7)
MCH RBC QN AUTO: 31.3 PG (ref 26.5–33)
MCHC RBC AUTO-ENTMCNC: 32.9 G/DL (ref 31.5–36.5)
MCV RBC AUTO: 95 FL (ref 78–100)
PLATELET # BLD AUTO: 383 10E9/L (ref 150–450)
POTASSIUM SERPL-SCNC: 3.9 MMOL/L (ref 3.4–5.3)
PROT SERPL-MCNC: 7.7 G/DL (ref 6.8–8.8)
RBC # BLD AUTO: 4.6 10E12/L (ref 3.8–5.2)
SODIUM SERPL-SCNC: 129 MMOL/L (ref 133–144)
WBC # BLD AUTO: 9.6 10E9/L (ref 4–11)

## 2019-11-11 PROCEDURE — 36415 COLL VENOUS BLD VENIPUNCTURE: CPT | Performed by: INTERNAL MEDICINE

## 2019-11-11 PROCEDURE — 80053 COMPREHEN METABOLIC PANEL: CPT | Performed by: INTERNAL MEDICINE

## 2019-11-11 PROCEDURE — 85027 COMPLETE CBC AUTOMATED: CPT | Performed by: INTERNAL MEDICINE

## 2019-11-13 ENCOUNTER — OFFICE VISIT (OUTPATIENT)
Dept: INTERNAL MEDICINE | Facility: CLINIC | Age: 84
End: 2019-11-13
Payer: MEDICARE

## 2019-11-13 VITALS
SYSTOLIC BLOOD PRESSURE: 118 MMHG | BODY MASS INDEX: 25.13 KG/M2 | HEART RATE: 65 BPM | WEIGHT: 128 LBS | TEMPERATURE: 97.6 F | OXYGEN SATURATION: 95 % | HEIGHT: 60 IN | DIASTOLIC BLOOD PRESSURE: 78 MMHG

## 2019-11-13 DIAGNOSIS — I10 BENIGN ESSENTIAL HYPERTENSION: ICD-10-CM

## 2019-11-13 DIAGNOSIS — E78.5 HYPERLIPIDEMIA LDL GOAL <100: ICD-10-CM

## 2019-11-13 DIAGNOSIS — I69.959 HEMIPLEGIA OF DOMINANT SIDE AS LATE EFFECT FOLLOWING CEREBROVASCULAR DISEASE (H): ICD-10-CM

## 2019-11-13 PROCEDURE — 99214 OFFICE O/P EST MOD 30 MIN: CPT | Performed by: INTERNAL MEDICINE

## 2019-11-13 ASSESSMENT — MIFFLIN-ST. JEOR: SCORE: 927.1

## 2019-11-13 NOTE — NURSING NOTE
Chief Complaint   Patient presents with     Hypertension     /78   Pulse 65   Temp 97.6  F (36.4  C) (Temporal)   Ht 1.524 m (5')   Wt 58.1 kg (128 lb)   SpO2 95%   BMI 25.00 kg/m   Estimated body mass index is 25 kg/m  as calculated from the following:    Height as of this encounter: 1.524 m (5').    Weight as of this encounter: 58.1 kg (128 lb).  Medication Reconciliation: complete      Health Maintenance that is potentially due pending provider review:  NONE    n/a    SONJA Cartagena

## 2019-11-13 NOTE — PROGRESS NOTES
Subjective     Steffany Brown is a 89 year old female who presents to clinic today for the following health issues:    HPI   Hypertension Follow-up      Do you check your blood pressure regularly outside of the clinic? No     Are you following a low salt diet? No    Are your blood pressures ever more than 140 on the top number (systolic) OR more   than 90 on the bottom number (diastolic), for example 140/90? n/a      How many servings of fruits and vegetables do you eat daily?  0-1    On average, how many sweetened beverages do you drink each day (soda, juice, sweet tea, etc)?   1    How many days per week do you miss taking your medication? 0    BP Readings from Last 6 Encounters:   11/13/19 118/78   07/24/19 122/80   06/21/19 110/80   01/18/19 122/80   11/13/18 118/80   10/25/18 (!) 179/97         2.  History of multiple strokes over the last several years.  Continues to have expressive aphasia and right hemiplegia.  No new neurological symptoms.  Patient cannot walk more than a few steps and no longer uses a walker, using a wheelchair almost full-time.  She is still able to function in her independent living situation,.  She is able to pivot transfer with the assistance of one person from the wheelchair to the bed.  No side effects of medications.  Told continue daily Plavix, denies excessive bleeding or bruising.    3.  Has history of hyperlipidemia.  On statin for this, denies any significant side effects of this medication.      Latest labs reviewed:    Recent Labs   Lab Test 06/17/19  0823 05/22/18  0744  05/12/15  0742 05/19/14  0741   CHOL 170 128   < > 153 161   HDL 62 48*   < > 60 65   LDL 83 61   < > 71 81   TRIG 124 97   < > 111 77   CHOLHDLRATIO  --   --   --  2.6 2.5    < > = values in this interval not displayed.        Lab Results   Component Value Date    AST 20 11/11/2019               Reviewed and updated as needed this visit by Provider  Tobacco  Allergies  Meds  Problems  Med Hx  Surg Hx   Fam Hx         Review of Systems         Objective    /78   Pulse 65   Temp 97.6  F (36.4  C) (Temporal)   Ht 1.524 m (5')   Wt 58.1 kg (128 lb)   SpO2 95%   BMI 25.00 kg/m    Body mass index is 25 kg/m .  Physical Exam                 Past Medical History:  ---------------------------  Past Medical History:   Diagnosis Date     Age-related macular degeneration, dry, both eyes 10/24/14    per vitreoretinal specialists     Cerebral infarction (H)     aphasia and chronic right sided waekness     Diverticulosis of colon (without mention of hemorrhage)     Several diverticuli seen on colonoscopy     Eczema      Family history of colon cancer     mother  from colon cancer age 89     Hip fracture, intertrochanteric (H) 9/10/09    left hip fracture, S/P ORIF     History of small bowel obstruction ,      Osteoporosis, unspecified      Other and unspecified hyperlipidemia      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     Other speech disturbance 3/05    chronic dysarthria since CVI     Pelvic mass in female 10/3/11    s/p LEODAN/BSO; pathology:  Serous cystadenofibroma, 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     Personal history of colonic polyps      Psoriasis      Rheumatoid arthritis(714.0)      Unspecified essential hypertension        Past Surgical History:  ---------------------------  Past Surgical History:   Procedure Laterality Date     C APPENDECTOMY       COLONOSCOPY  03    Diverticulosis, single small polyp removed (at [facility])     COLONOSCOPY  08    diverticulosis, few scattered mild angioectasias, no polyps     COLONOSCOPY  2013    Procedure: COLONOSCOPY;  COLONOSCOPY ;  Surgeon: Romario Watters MD;  Location:  GI     D [facility] C       FRACTURE TX, HIP RT/LT  9/10/09    Open reduction with IM nailing of left hip fracture using a gamma nail     HC COLONOSCOPY THRU STOMA W  BIOPSY/CAUTERY TUMOR/POLYP/LESION  3/00    colonoscopy adenamatous polyp ([facility])     HYSTERECTOMY, LEODAN  10/03/11    s/p LEODAN/BSO to remove pelvic mass; pathology:  Serous cystadenofibroma, 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     OPEN REDUCTION INTERNAL FIXATION ANKLE  7/11/2014    Procedure: OPEN REDUCTION INTERNAL FIXATION ANKLE;  Surgeon: Emmanuel Gomez MD;  Location: [facility] OR     OPEN REDUCTION INTERNAL FIXATION HIP NAILING Right 7/20/2016    Procedure: OPEN REDUCTION INTERNAL FIXATION HIP NAILING;  Surgeon: Jeronimo Giordano MD;  Location: [facility] OR     RECTAL SURGERY  1971    anterior laproscopic repair of sigmoid prolapse     SMALL INTESTINE SURGERY       STRESS ECHO ([facility])  4/98    stress echo (negative)     SURGICAL HISTORY OF -   3/12/05    attempted angioplasty/stent of right middle cerebral artery ( M1 segment), no effective       Current Medications:  ---------------------------  Current Outpatient Medications   Medication Sig Dispense Refill     acetaminophen (TYLENOL) 500 MG tablet Take 500-1,000 mg by mouth every 6 hours as needed        amoxicillin (AMOXIL) 500 MG capsule TAKE 4 CAPSULES BY MOUTH FOR 1 TIME DOSE 1 HOUR PRIOR TO DENTAL PROCEDURE 4 capsule 2     atenolol (TENORMIN) 50 MG tablet Take 1 tablet (50 mg) by mouth daily 90 tablet 3     carbamide peroxide (DEBROX) 6.5 % otic solution Place 5-10 drops into both ears 2 times daily 30 mL 0     cholecalciferol 2000 UNITS CAPS Take 2 capsules by mouth 2 times daily        clopidogrel (PLAVIX) 75 MG tablet Take 1 tablet (75 mg) by mouth daily 90 tablet 3     loratadine (CLARITIN) 10 MG tablet Take 10 mg by mouth daily       lovastatin (MEVACOR) 40 MG tablet Take 1 tablet (40 mg) by mouth At Bedtime 90 tablet 3     Multiple Vitamins-Minerals (PRESERVISION AREDS) CAPS Take 1 capsule by mouth 2 times daily          Allergies:  -------------  Allergies   Allergen Reactions     Atorvastatin Calcium Cramps     Leg cramps     Lisinopril Swelling and Rash      Seasonal Allergies        Social History:  -------------------  Social History     Socioeconomic History     Marital status: Single     Spouse name: Not on file     Number of children: 0     Years of education: Not on file     Highest education level: Not on file   Occupational History     Occupation:      Employer: OTHER     Comment: [employer]   Social Needs     Financial resource strain: Not on file     Food insecurity:     Worry: Not on file     Inability: Not on file     Transportation needs:     Medical: Not on file     Non-medical: Not on file   Tobacco Use     Smoking status: Former Smoker     Last attempt to quit: 1948     Years since quittin.9     Smokeless tobacco: Never Used   Substance and Sexual Activity     Alcohol use: Yes     Comment: rare     Drug use: No     Sexual activity: Never   Lifestyle     Physical activity:     Days per week: Not on file     Minutes per session: Not on file     Stress: Not on file   Relationships     Social connections:     Talks on phone: Not on file     Gets together: Not on file     Attends Episcopalian service: Not on file     Active member of club or organization: Not on file     Attends meetings of clubs or organizations: Not on file     Relationship status: Not on file     Intimate partner violence:     Fear of current or ex partner: Not on file     Emotionally abused: Not on file     Physically abused: Not on file     Forced sexual activity: Not on file   Other Topics Concern     Parent/sibling w/ CABG, MI or angioplasty before 65F 55M? Not Asked   Social History Narrative    Living arrangements - the patient lives with her friendKamini       Family Medical History:  ------------------------------  Family History   Problem Relation Age of Onset     Cancer Mother          of colon ca age 89     Cancer - colorectal Mother      Cardiovascular Father          at age 65     Cancer Brother          of lung ca age 65          ROS:  REVIEW OF SYSTEMS:    RESP: negative for cough, dyspnea, wheezing, hemoptysis  CV: negative for chest pain, palpitations, PND, FENG, orthopnea  GI: negative for dysphagia, N/V, pain, melena, diarrhea and constipation  NEURO: negative for new numbness/tingling, paralysis, incoordination, or focal weakness ; POS for chronic expressive a aphasia and right hemiplegia since stroke.      OBJECTIVE:                                                    /78   Pulse 65   Temp 97.6  F (36.4  C) (Temporal)   Ht 1.524 m (5')   Wt 58.1 kg (128 lb)   SpO2 95%   BMI 25.00 kg/m       GENERAL alert and no distress  EYES:  Normal sclera,conjunctiva, EOMI  HENT: oral and posterior pharynx without lesions or erythema, facies symmetric  NECK: Neck supple. No LAD, without thyroidmegaly.  RESP: Clear to ausculation bilaterally without wheezes or crackles. Normal BS in all fields.  CV: RRR normal S1S2 without murmurs, rubs or gallops.  LYMPH: no cervical lymph adenopathy appreciated  MS: extremities-chronic arthritis right knee  EXT:  no lower extremity edema  PSYCH: Alert and oriented times 3; speech-dysarthria, consistent with a aphasia since her stroke.  SKIN:  No obvious significant skin lesions on visible portions of face   NEURO: Right hemiplegia.         ASSESSMENT/PLAN:                                                      (I10) Benign essential hypertension  Comment: This condition is currently controlled on the current medical regimen.  Continue current therapy.   Discussed current hypertension treatment guidelines, including indications for treatment and treatment options.  Discussed the importance for aggressive management of HTN to prevent vascular complications later.  Recommended lower fat, lower carbohydrate, and lower sodium (<2000 mg)diet.  Discussed required intervals for follow up on HTN, lab studies.  Recommened pt. follow their blood pressures outside the clinic to ensure that BPs are remaining  within guidelines, and to contact me if the readings are not within guidelines on a regular basis so we can adjust treatment as needed.   Plan: Lipid panel reflex to direct LDL Fasting,         Comprehensive metabolic panel, CBC with         platelets            (E78.5) Hyperlipidemia LDL goal <100  Comment: Discussed guidelines recommending a statin cholesterol lowering medication for any patient with either diabetes and/or vascular disease, aiming for a LDL goal under 100 for sure, ideally under 70.    Reviewed statins and their side effects including muscle pain, muscle inflammation, GI upset.  Told the patient to stop the medication in question and to call if any side effects develop.   Recommended CoQ10 200-300 mg at the same time as taking the statin medication to help reduce the chance of muscle side effects from the statin.    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   Discussed the risks and benefits of continue on Plavix, given history of multiple strokes, should continue on Plavix.  She has no signs of excessive bleeding or bruising at this time.  Plan: Lipid panel reflex to direct LDL Fasting,         Comprehensive metabolic panel, CBC with         platelets               See Patient Instructions    TONY SINGH M.D., MD  [facility]    (Chart documentation may have been completed, in part, with [employer] voice-recognition software. Even though reviewed, some grammatical, spelling, and word errors may remain.)

## 2019-11-13 NOTE — PATIENT INSTRUCTIONS
"*  Continue all medications at the same doses.  Contact your usual pharmacy if you need refills.     *  Return to see me in 6 months, sooner if needed.  Please get fasting labs done at the [facility] or any other [facility] lab 1-2 days before this appointment (schedule a \"lab appointment\").  If you get the labs done at another clinic, make arrangements with them directly.  The orders will be in place.  Eat nothing for at least 8 hours prior to having these labs drawn.  Use [employer] or Call [phone] to schedule the appointment with me and lab appointment.         5 GOALS TO PREVENT VASCULAR DISEASE:     1.  Aggressive blood pressure control, under 130/80 ideally.  Using medications if needed.    Your blood pressure is under good control    BP Readings from Last 4 Encounters:   11/13/19 118/78   07/24/19 122/80   06/21/19 110/80   01/18/19 122/80       2.  Aggressive LDL cholesterol (\"bad cholesterol\") lowering as indicated.    Your goal is an LDL under 130 for sure, preferably under 100.  (If you have diabetes or previous vascular disease, the the LDL goals would be under 100 for sure, preferably under 70.)    New guidelines identify four high-risk groups who could benefit from statins:   *people with pre-existing heart disease, such as those who have had a heart attack;   *people ages 40 to 75 who have diabetes of any type  *patients ages 40 to 75 with at least a 7.5% risk of developing cardiovascular disease over the next decade, according to a formula described in the guidelines  *patients with the sort of super-high cholesterol that sometimes runs in families, as evidenced by an LDL of 190 milligrams per deciliter or higher    Your cholesterol levels are well controlled.    Recent Labs   Lab Test 06/17/19  0823 05/22/18  0744  05/12/15  0742 05/19/14  0741   CHOL 170 128   < > 153 161   HDL 62 48*   < > 60 65   LDL 83 61   < > 71 81   TRIG 124 97   < > 111 77   CHOLHDLRATIO  --   --   --  " "2.6 2.5    < > = values in this interval not displayed.       3.  Aggressive diabetic prevention, screening and/or management.      You do not have diabetes as of the most recent blood tests.     4.  No smoking    5.  Consider Daily aspirin: Have a discussion about the relative benefits and risks of taking daily low dose aspirin (81 mg) tablet once per day over the age of 50, unless there is a specific reason that you cannot take aspirin (such as side effect, allergy, or you are on another \"blood thinner\").        --you should NOT take daily aspirin because you are already on Plavix (clopidogrel)      "

## 2019-12-16 ENCOUNTER — HEALTH MAINTENANCE LETTER (OUTPATIENT)
Age: 84
End: 2019-12-16

## 2020-07-01 DIAGNOSIS — E78.5 HYPERLIPIDEMIA LDL GOAL <100: ICD-10-CM

## 2020-07-01 RX ORDER — LOVASTATIN 40 MG
TABLET ORAL
Qty: 90 TABLET | Refills: 1 | Status: SHIPPED | OUTPATIENT
Start: 2020-07-01 | End: 2021-03-03

## 2020-07-10 DIAGNOSIS — I69.959 HEMIPLEGIA OF DOMINANT SIDE AS LATE EFFECT FOLLOWING CEREBROVASCULAR DISEASE (H): ICD-10-CM

## 2020-07-14 RX ORDER — CLOPIDOGREL BISULFATE 75 MG/1
TABLET ORAL
Qty: 90 TABLET | Refills: 0 | Status: SHIPPED | OUTPATIENT
Start: 2020-07-14 | End: 2020-10-13

## 2020-07-22 DIAGNOSIS — I10 BENIGN ESSENTIAL HYPERTENSION: ICD-10-CM

## 2020-07-22 RX ORDER — ATENOLOL 50 MG/1
TABLET ORAL
Qty: 90 TABLET | Refills: 0 | Status: SHIPPED | OUTPATIENT
Start: 2020-07-22 | End: 2020-10-23

## 2020-08-26 ENCOUNTER — NURSE TRIAGE (OUTPATIENT)
Dept: INTERNAL MEDICINE | Facility: CLINIC | Age: 85
End: 2020-08-26

## 2020-08-26 NOTE — TELEPHONE ENCOUNTER
"Kamini CARSON, pt's friend, calling into consent to communicate, calling.   Isreal, [phone] is the call back contact.    Pt is on eliquis and often bumps her leg getting out of bed.    Has a dime size bruise on her shin.   Open sore that is scabbed over.   No redness around the area. Mild warmth.  No fevers. No confusion or other behavioral/pain concerns.       Pt POA is concerned about the area being infected. No immediate signs of infection noted to RN but pt's POA would like this area looked at. They are not willing to come in for an office visit and were hoping to text provider a picture of the wound. \"It is too difficult to get Steffany out of the house- we do not want to bring her to the Emergency Department if it is nothing\".    RN advised that video call (has cell phone with camera and joseluis) would be an option. POA is very agreeable to this. Scheduled in for 08/27 a.m.    Instructed on need for emergency evaluation or higher level of care. States understanding.       Additional Information    Negative: Shock suspected (e.g., cold/pale/clammy skin, too weak to stand, low BP, rapid pulse)    Negative: Cut on the neck, chest, back, or abdomen that may go deep (e.g., stab wound or other suspicious penetrating injury)    Negative: Major bleeding (actively dripping or spurting) that can't be stopped    Negative: Amputation    Negative: Sounds like a life-threatening emergency to the triager    Negative: Animal bite and broken skin    Negative: Injury is a puncture wound    Negative: Splinter in the skin    Negative: Wound looks infected    Negative: Burn    Negative: High pressure injection injury (e.g., from paint gun, usually work-related)    Negative: Skin loss involves more than 10% of surface area (Note: the palm of the hand = 1%)    Negative: Skin is split open or gaping (length > 1/2 inch or 12 mm on the skin, 1/4 inch or 6 mm on the face)    Negative: Bleeding won't stop after 10 minutes of direct pressure " "(using correct technique)    Negative: Cut or scrape is very deep (e.g., can see bone or tendons)    Negative: Dirt in the wound and not removed after 15 minutes of scrubbing    Negative: Wound causes numbness (i.e., loss of sensation)    Negative: Wound causes weakness (i.e., decreased ability to move hand, finger, toe)    Negative: Sounds like a serious injury to the triager    Negative: Looks infected (fever, spreading redness, pus, or red streak)    Negative: Raised bruise with size > 2 inches (5 cm) that is getting bigger    Negative: SEVERE pain (e.g., excruciating)    Negative: No prior tetanus shots (or is not fully vaccinated) and any wound (e.g., cut or scrape)    Patient wants to be seen    Answer Assessment - Initial Assessment Questions  1. APPEARANCE of INJURY: \"What does the injury look like?\"       Dime size scab    2. SIZE: \"How large is the cut?\"       Dime size    3. BLEEDING: \"Is it bleeding now?\" If so, ask: \"Is it difficult to stop?\"       Not bleeding at this time    4. LOCATION: \"Where is the injury located?\"       R shin    5. ONSET: \"How long ago did the injury occur?\"       Few days ago    6. MECHANISM: \"Tell me how it happened.\"       Hit let    7. TETANUS: \"When was the last tetanus booster?\"      Unknown    8. PREGNANCY: \"Is there any chance you are pregnant?\" \"When was your last menstrual period?\"      N/a    Protocols used: SKIN INJURY-A-OH      "

## 2020-08-27 ENCOUNTER — VIRTUAL VISIT (OUTPATIENT)
Dept: INTERNAL MEDICINE | Facility: CLINIC | Age: 85
End: 2020-08-27
Payer: MEDICARE

## 2020-08-27 DIAGNOSIS — I69.959 HEMIPLEGIA OF DOMINANT SIDE AS LATE EFFECT FOLLOWING CEREBROVASCULAR DISEASE (H): ICD-10-CM

## 2020-08-27 DIAGNOSIS — S81.811A SKIN TEAR OF RIGHT LOWER LEG WITHOUT COMPLICATION, INITIAL ENCOUNTER: Primary | ICD-10-CM

## 2020-08-27 DIAGNOSIS — I10 BENIGN ESSENTIAL HYPERTENSION: ICD-10-CM

## 2020-08-27 PROCEDURE — 99214 OFFICE O/P EST MOD 30 MIN: CPT | Mod: 95 | Performed by: INTERNAL MEDICINE

## 2020-08-27 NOTE — PATIENT INSTRUCTIONS
"*  No evidence for skin infection    *  The redness immediately adjacent to the wound is normal for simple wound healing    * Keep clean with simple soap and wter once per day    *  Cover with gauze pad and paper tape as needed to protect clothes and sheets.     *  APply small dab of antibiotic ointment as needed.     *  Observe for any signs and symptoms of infeciton including worsening redness, expanding further beyodn the region of the skin tear, fevers, chills, pain, worsenign puruelnt drainage.     *  Continue all medications at the same doses.  Contact your usual pharmacy if you need refills.     Return to see me in 3 months, sooner if needed.  Please get fasting labs done at the [facility] or any other [facility] lab 1-2 days before this appointment (schedule a \"lab appointment\").  If you get the labs done at another clinic, make arrangements with them directly.  The orders will be in place.  Eat nothing for at least 8 hours prior to having these labs drawn.  Use [employer] or Call [phone] to schedule the appointment with me and lab appointment.      "

## 2020-08-27 NOTE — PROGRESS NOTES
"Steffany Brown is a 89 year old female who is being evaluated via a billable video visit.      The patient has been notified of following:     \"This video visit will be conducted via a call between you and your physician/provider. We have found that certain health care needs can be provided without the need for an in-person physical exam.  This service lets us provide the care you need with a video conversation.  If a prescription is necessary we can send it directly to your pharmacy.  If lab work is needed we can place an order for that and you can then stop by our lab to have the test done at a later time.    Video visits are billed at different rates depending on your insurance coverage.  Please reach out to your insurance provider with any questions.    If during the course of the call the physician/provider feels a video visit is not appropriate, you will not be charged for this service.\"    Patient has given verbal consent for Video visit? Yes  How would you like to obtain your AVS? MyChart  If you are dropped from the video visit, the video invite should be resent to: Text to cell phone: [phone]  Will anyone else be joining your video visit? No    Subjective     Steffany Brown is a 89 year old female who presents today via video visit for the following health issues:    HPI    Concern - Wound  Onset: noticed sx about 5 days ago  Description: RT shin   Intensity: moderate  Progression of Symptoms:  same  Accompanying Signs & Symptoms: redness and scabbing, and one area about the size of a dime is black in color   Previous history of similar problem: none  Precipitating factors:        Worsened by: none  Alleviating factors:        Improved by: bacitracin ointment  Therapies tried and outcome: bactine spray and bacitracin ointment     anged right lower leg on bed post.   Had abreasion, skin tear.   Friend worried that it was infected due to small area of redness immediately around the wound,.   Has few " other smaller brusises on the lower leg.    Otherwise at her usual baseline.     Just turned 89 years old.     No feves, no chills    2.  BP stable on Atenolol    3.  history of multiple strokes, taking plavix daily to prevent future strokes.      I reviewed the information recorded in the patient's EPIC chart (including but not limited to medical history, surgical history, problem list, medication list, and allergy list) and updated information as needed.         Review of Systems   Constitutional, HEENT, cardiovascular, pulmonary, gi and gu systems are negative, except as otherwise noted.      Objective           Vitals:  No vitals were obtained today due to virtual visit.    Physical Exam     GENERAL: Healthy, alert and no distress  EYES: Eyes grossly normal to inspection.  No discharge or erythema, or obvious scleral/conjunctival abnormalities.  RESP: No audible wheeze, cough, or visible cyanosis.  No visible retractions or increased work of breathing.    SKIN: face: No significant rash, abnormal pigmentation or lesions.  Right lower leg: sacttered brusise  One skin tear/abrasion, minimal erythema, limited to immediately adjacent to the skin tear, no surrounding eyrthema, no visible purulence  NEURO: Cranial nerves grossly intact.    PSYCH: chronic dysarthria with chronic aphasia since strokes,  appearance well-groomed.                (S81.811A) Skin tear of right lower leg without complication, initial encounter  (primary encounter diagnosis)  Comment: does not appear infected, no antibiotic tablets needed at this time  Basic wound care, keep clean and dry, wash with soap and water, cover with gauze pad and paper tape if needed to protect clothes or sheets.   Discussed signs and symptoms of worsening infeciton.   Apply antibitoic oointment 1-2 time per day if needed   Plan:     (I10) Benign essential hypertension  Comment: This condition is currently controlled on the current medical regimen.  Continue current  therapy.   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  Continue current therapy.   Plan:          Video-Visit Details    Type of service:  Video Visit    Video Start Time: 0948 AM    Video End Time:10:04 AM    Originating Location (pt. Location): Home    Distant Location (provider location):  [facility]     Platform used for Video Visit: Dickson Hancock M.D.  Dept. of Internal Medicine  [facility]

## 2020-09-18 ENCOUNTER — ALLIED HEALTH/NURSE VISIT (OUTPATIENT)
Dept: NURSING | Facility: CLINIC | Age: 85
End: 2020-09-18
Payer: MEDICARE

## 2020-09-18 DIAGNOSIS — Z23 NEEDS FLU SHOT: Primary | ICD-10-CM

## 2020-09-18 PROCEDURE — 99207 ZZC NO CHARGE NURSE ONLY: CPT

## 2020-09-18 PROCEDURE — G0008 ADMIN INFLUENZA VIRUS VAC: HCPCS

## 2020-09-18 PROCEDURE — 90662 IIV NO PRSV INCREASED AG IM: CPT

## 2020-09-18 NOTE — PROGRESS NOTES
.Injectable Influenza Immunization Documentation    1.  Has the patient received the information for the injectable influenza vaccine? YES     2. Is the patient 6 months of age or older? YES     3. Does the patient have any of the following contraindications?         Severe allergy to eggs? No     Severe allergic reaction to previous influenza vaccines? No   Severe allergy to latex? No       History of Guillain-[place]anville syndrome? No     Currently have a temperature greater than 100.4F? No           Vaccination given by Adrianna Overton, [facility]

## 2020-10-11 DIAGNOSIS — I69.959 HEMIPLEGIA OF DOMINANT SIDE AS LATE EFFECT FOLLOWING CEREBROVASCULAR DISEASE (H): ICD-10-CM

## 2020-10-13 RX ORDER — CLOPIDOGREL BISULFATE 75 MG/1
TABLET ORAL
Qty: 90 TABLET | Refills: 0 | Status: SHIPPED | OUTPATIENT
Start: 2020-10-13 | End: 2021-01-14

## 2020-10-23 DIAGNOSIS — I10 BENIGN ESSENTIAL HYPERTENSION: ICD-10-CM

## 2020-10-23 RX ORDER — ATENOLOL 50 MG/1
TABLET ORAL
Qty: 90 TABLET | Refills: 0 | Status: SHIPPED | OUTPATIENT
Start: 2020-10-23 | End: 2021-02-03

## 2021-01-12 DIAGNOSIS — I69.959 HEMIPLEGIA OF DOMINANT SIDE AS LATE EFFECT FOLLOWING CEREBROVASCULAR DISEASE (H): ICD-10-CM

## 2021-01-14 RX ORDER — CLOPIDOGREL BISULFATE 75 MG/1
TABLET ORAL
Qty: 90 TABLET | Refills: 0 | Status: SHIPPED | OUTPATIENT
Start: 2021-01-14 | End: 2021-03-03

## 2021-01-14 NOTE — TELEPHONE ENCOUNTER
Routing refill request to provider for review/approval because:  Labs not current:  See orders in epic

## 2021-01-15 ENCOUNTER — HEALTH MAINTENANCE LETTER (OUTPATIENT)
Age: 86
End: 2021-01-15

## 2021-02-01 DIAGNOSIS — I10 BENIGN ESSENTIAL HYPERTENSION: ICD-10-CM

## 2021-02-03 RX ORDER — ATENOLOL 50 MG/1
TABLET ORAL
Qty: 90 TABLET | Refills: 0 | Status: SHIPPED | OUTPATIENT
Start: 2021-02-03 | End: 2021-03-03

## 2021-02-04 ENCOUNTER — IMMUNIZATION (OUTPATIENT)
Dept: NURSING | Facility: CLINIC | Age: 86
End: 2021-02-04
Payer: MEDICARE

## 2021-02-04 PROCEDURE — 0001A PR COVID VAC PFIZER DIL RECON 30 MCG/0.3 ML IM: CPT

## 2021-02-04 PROCEDURE — 91300 PR COVID VAC PFIZER DIL RECON 30 MCG/0.3 ML IM: CPT

## 2021-02-25 ENCOUNTER — IMMUNIZATION (OUTPATIENT)
Dept: NURSING | Facility: CLINIC | Age: 86
End: 2021-02-25
Attending: INTERNAL MEDICINE
Payer: MEDICARE

## 2021-02-25 PROCEDURE — 91300 PR COVID VAC PFIZER DIL RECON 30 MCG/0.3 ML IM: CPT

## 2021-02-25 PROCEDURE — 0002A PR COVID VAC PFIZER DIL RECON 30 MCG/0.3 ML IM: CPT

## 2021-03-03 ENCOUNTER — OFFICE VISIT (OUTPATIENT)
Dept: INTERNAL MEDICINE | Facility: CLINIC | Age: 86
End: 2021-03-03
Payer: MEDICARE

## 2021-03-03 VITALS
OXYGEN SATURATION: 95 % | BODY MASS INDEX: 25 KG/M2 | DIASTOLIC BLOOD PRESSURE: 74 MMHG | HEART RATE: 73 BPM | SYSTOLIC BLOOD PRESSURE: 118 MMHG | HEIGHT: 60 IN | TEMPERATURE: 97.6 F

## 2021-03-03 DIAGNOSIS — Z79.2 PROPHYLACTIC ANTIBIOTIC: ICD-10-CM

## 2021-03-03 DIAGNOSIS — E22.2 SIADH (SYNDROME OF INAPPROPRIATE ADH PRODUCTION) (H): ICD-10-CM

## 2021-03-03 DIAGNOSIS — Z00.00 ENCOUNTER FOR MEDICARE ANNUAL WELLNESS EXAM: Primary | ICD-10-CM

## 2021-03-03 DIAGNOSIS — I69.959 HEMIPLEGIA OF DOMINANT SIDE AS LATE EFFECT FOLLOWING CEREBROVASCULAR DISEASE (H): ICD-10-CM

## 2021-03-03 DIAGNOSIS — I10 BENIGN ESSENTIAL HYPERTENSION: ICD-10-CM

## 2021-03-03 DIAGNOSIS — E78.5 HYPERLIPIDEMIA LDL GOAL <100: ICD-10-CM

## 2021-03-03 DIAGNOSIS — M06.9 RHEUMATOID ARTHRITIS INVOLVING MULTIPLE SITES, UNSPECIFIED WHETHER RHEUMATOID FACTOR PRESENT (H): ICD-10-CM

## 2021-03-03 LAB
ALBUMIN SERPL-MCNC: 3.4 G/DL (ref 3.4–5)
ALP SERPL-CCNC: 107 U/L (ref 40–150)
ALT SERPL W P-5'-P-CCNC: 15 U/L (ref 0–50)
ANION GAP SERPL CALCULATED.3IONS-SCNC: 8 MMOL/L (ref 3–14)
AST SERPL W P-5'-P-CCNC: 17 U/L (ref 0–45)
BILIRUB SERPL-MCNC: 0.5 MG/DL (ref 0.2–1.3)
BUN SERPL-MCNC: 8 MG/DL (ref 7–30)
CALCIUM SERPL-MCNC: 10 MG/DL (ref 8.5–10.1)
CHLORIDE SERPL-SCNC: 98 MMOL/L (ref 94–109)
CHOLEST SERPL-MCNC: 209 MG/DL
CO2 SERPL-SCNC: 27 MMOL/L (ref 20–32)
CREAT SERPL-MCNC: 0.6 MG/DL (ref 0.52–1.04)
ERYTHROCYTE [DISTWIDTH] IN BLOOD BY AUTOMATED COUNT: 12.9 % (ref 10–15)
GFR SERPL CREATININE-BSD FRML MDRD: 80 ML/MIN/{1.73_M2}
GLUCOSE SERPL-MCNC: 101 MG/DL (ref 70–99)
HCT VFR BLD AUTO: 45.4 % (ref 35–47)
HDLC SERPL-MCNC: 55 MG/DL
HGB BLD-MCNC: 14.9 G/DL (ref 11.7–15.7)
LDLC SERPL CALC-MCNC: 128 MG/DL
MCH RBC QN AUTO: 29.9 PG (ref 26.5–33)
MCHC RBC AUTO-ENTMCNC: 32.8 G/DL (ref 31.5–36.5)
MCV RBC AUTO: 91 FL (ref 78–100)
NONHDLC SERPL-MCNC: 154 MG/DL
PLATELET # BLD AUTO: 359 10E9/L (ref 150–450)
POTASSIUM SERPL-SCNC: 4.2 MMOL/L (ref 3.4–5.3)
PROT SERPL-MCNC: 7.8 G/DL (ref 6.8–8.8)
RBC # BLD AUTO: 4.98 10E12/L (ref 3.8–5.2)
SODIUM SERPL-SCNC: 133 MMOL/L (ref 133–144)
TRIGL SERPL-MCNC: 128 MG/DL
WBC # BLD AUTO: 14 10E9/L (ref 4–11)

## 2021-03-03 PROCEDURE — G0439 PPPS, SUBSEQ VISIT: HCPCS | Performed by: INTERNAL MEDICINE

## 2021-03-03 PROCEDURE — 80061 LIPID PANEL: CPT | Performed by: INTERNAL MEDICINE

## 2021-03-03 PROCEDURE — 85027 COMPLETE CBC AUTOMATED: CPT | Performed by: INTERNAL MEDICINE

## 2021-03-03 PROCEDURE — 99214 OFFICE O/P EST MOD 30 MIN: CPT | Mod: 25 | Performed by: INTERNAL MEDICINE

## 2021-03-03 PROCEDURE — 36415 COLL VENOUS BLD VENIPUNCTURE: CPT | Performed by: INTERNAL MEDICINE

## 2021-03-03 PROCEDURE — 80053 COMPREHEN METABOLIC PANEL: CPT | Performed by: INTERNAL MEDICINE

## 2021-03-03 RX ORDER — ATENOLOL 50 MG/1
50 TABLET ORAL DAILY
Qty: 90 TABLET | Refills: 3 | Status: SHIPPED | OUTPATIENT
Start: 2021-03-03 | End: 2021-09-03

## 2021-03-03 RX ORDER — LOVASTATIN 40 MG
40 TABLET ORAL AT BEDTIME
Qty: 90 TABLET | Refills: 1 | Status: SHIPPED | OUTPATIENT
Start: 2021-03-03 | End: 2021-09-03

## 2021-03-03 RX ORDER — AMOXICILLIN 500 MG/1
CAPSULE ORAL
Qty: 4 CAPSULE | Refills: 2 | Status: SHIPPED | OUTPATIENT
Start: 2021-03-03 | End: 2021-05-10

## 2021-03-03 RX ORDER — CLOPIDOGREL BISULFATE 75 MG/1
75 TABLET ORAL DAILY
Qty: 90 TABLET | Refills: 3 | Status: SHIPPED | OUTPATIENT
Start: 2021-03-03 | End: 2021-09-03

## 2021-03-03 NOTE — PROGRESS NOTES
SUBJECTIVE:   Steffany Brown is a 89 year old female who presents for Preventive Visit.      Patient has been advised of split billing requirements and indicates understanding: Yes   Are you in the first 12 months of your Medicare coverage?  No    HPI  Do you feel safe in your environment? Yes    Have you ever done Advance Care Planning? (For example, a Health Directive, POLST, or a discussion with a medical provider or your loved ones about your wishes): Yes, advance care planning is on file.       Fall risk  Fallen 2 or more times in the past year?: No  Any fall with injury in the past year?: No    Cognitive Screening -not done-post stroke victim-unable  1) Repeat 3 items (Leader, Season, Table)    2) Clock draw: unable to compelte due to aphasia and stroke.   3) 3 item recall:   Results:     Mini-CogTM Copyright SUELLEN Serrato. Licensed by the author for use in [facility]; reprinted with permission ([email or web address]). All rights reserved.      Do you have sleep apnea, excessive snoring or daytime drowsiness?: no     referral for home care    Reviewed and updated as needed this visit by clinical staff  Tobacco  Allergies  Meds  Problems  Med Hx  Surg Hx  Fam Hx          Reviewed and updated as needed this visit by Provider  Tobacco  Allergies  Meds  Problems  Med Hx  Surg Hx  Fam Hx         Social History     Tobacco Use     Smoking status: Former Smoker     Quit date: 1948     Years since quittin.2     Smokeless tobacco: Never Used   Substance Use Topics     Alcohol use: Yes     Comment: rare     If you drink alcohol do you typically have >3 drinks per day or >7 drinks per week? No    Alcohol Use 3/3/2021   Prescreen: >3 drinks/day or >7 drinks/week? No           1.  history of CVA with dense right sided hemiplegia, expressive aphasia chronic dysarthria   No new symptoms     2.    Hypertension:  History of hypertension, on medication.  No reported side effects from medications.     Reviewed last 6 BP readings in chart:  BP Readings from Last 6 Encounters:   03/03/21 118/74   11/13/19 118/78   07/24/19 122/80   06/21/19 110/80   01/18/19 122/80   11/13/18 118/80     No active cardiac complaints or symptoms with exercise.     3.  Hyperlipidemia:  Has history of hyperlipidemia.    The patient is taking a medication for this.  Denies any significant side effects from his medication.      Latest labs reviewed:    Recent Labs   Lab Test 06/17/19  0823 05/22/18  0744 05/12/15  0742 05/12/15  0742 05/19/14  0741   CHOL 170 128   < > 153 161   HDL 62 48*   < > 60 65   LDL 83 61   < > 71 81   TRIG 124 97   < > 111 77   CHOLHDLRATIO  --   --   --  2.6 2.5    < > = values in this interval not displayed.        Lab Results   Component Value Date    AST 20 11/11/2019       4.  History of rheumatoid arthritis, has diffuse changes in the joints of her upper extremities from rheumatoid arthritis no new symptoms.    Current providers sharing in care for this patient include:   Patient Care Team:  Leo Hancock MD as PCP - General  Leo Hancock MD as Assigned PCP    The following health maintenance items are reviewed in Epic and correct as of today:  Health Maintenance   Topic Date Due     FALL RISK ASSESSMENT  07/24/2020     ALT  11/11/2020     PHQ-2  01/01/2021     BMP  09/03/2021     MEDICARE ANNUAL WELLNESS VISIT  03/03/2022     LIPID  03/03/2022     DTAP/TDAP/TD IMMUNIZATION (4 - Td) 09/04/2022     ADVANCE CARE PLANNING  03/03/2026     INFLUENZA VACCINE  Completed     Pneumococcal Vaccine: 65+ Years  Completed     ZOSTER IMMUNIZATION  Completed     COVID-19 Vaccine  Completed     Pneumococcal Vaccine: Pediatrics (0 to 5 Years) and At-Risk Patients (6 to 64 Years)  Aged Out     IPV IMMUNIZATION  Aged Out     MENINGITIS IMMUNIZATION  Aged Out     HEPATITIS B IMMUNIZATION  Aged Out       **I reviewed the information recorded in the patient's EPIC chart (including but not limited to  medical history, surgical history, family history, problem list, medication list, and allergy list) and updated the information as indicated based on the patients reported information.         Review of Systems  Constitutional, HEENT, cardiovascular, pulmonary, gi and gu systems are negative, except as otherwise noted.    OBJECTIVE:   /74   Pulse 73   Temp 97.6  F (36.4  C) (Tympanic)   Ht 1.524 m (5')   LMP  (LMP Unknown)   SpO2 95%   Breastfeeding No   BMI 25.00 kg/m   Estimated body mass index is 25 kg/m  as calculated from the following:    Height as of this encounter: 1.524 m (5').    Weight as of 11/13/19: 58.1 kg (128 lb).  Physical Exam  GENERAL alert and no distress  EYES:  Normal sclera,conjunctiva, EOMI  HENT: oral and posterior pharynx without lesions or erythema, facies symmetric  NECK: Neck supple. No LAD, without thyroidmegaly.  RESP: Clear to ausculation bilaterally without wheezes or crackles. Normal BS in all fields.  CV: RRR normal S1S2 without murmurs, rubs or gallops.  LYMPH: no cervical lymph adenopathy appreciated  MS: extremities-diffuse arthropathy consistent with previous rheumatoid arthritis especially noticeable MCP and PIP joints and wrists of both hands.  Significant weakness and right-sided hemiplegia, no changes from previous exam.  Patient is unable to walk or bear weight, uses wheelchair.  EXT:  no lower extremity edema  PSYCH: Alert and oriented times 3; speech-chronic dysarthria with word finding difficulty, no change from previous exam, these are unchanged from previous strokes.  SKIN:  No obvious significant skin lesions on visible portions of face     Diagnostic Test Results:  Labs reviewed in Epic    ASSESSMENT / PLAN:     (Z00.00) Encounter for Medicare annual wellness exam  (primary encounter diagnosis)  Comment: Discussed cardiac disease risk factors and cardiac disease risk factor modification, including diabetes screening, blood pressure screening (and  management if indicated), and cholesterol screening.   Reviewed immunzation guidelines, including pneumococcal vaccines, annual influenza, and shingles vaccines.   Discussed routine cancer screenings, including skin cancer, colon cancer screening for everyone until age 80, prostate cancer screening in men until age 75, mammogram and PAP/pelvic for women until age 75.   Recommended regular dentist visits to care for remaining teeth.   Recommended regular screening for vision and glaucoma.   Recommended safe driving and accident avoidance.   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  Continue current therapy.   Discussed secondary risk factor modification and recommended continuing aggressive management of these items.   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  Plan: CBC with platelets, Basic metabolic panel, AST,        ALT, CBC with platelets, Basic metabolic panel            (M06.9) Rheumatoid arthritis involving multiple sites, unspecified whether rheumatoid factor present (H)  Comment: No new symptoms,  Plan: CBC with platelets, Basic metabolic panel, AST,        ALT            (I10) Benign essential hypertension  Comment: This condition is currently controlled on the current medical regimen.  Continue current therapy.   Plan: atenolol (TENORMIN) 50 MG tablet, CBC with         platelets, Basic metabolic panel, CBC with         platelets, Basic metabolic panel            (E78.5) Hyperlipidemia LDL goal <100  Comment: Discussed guidelines recommending a statin cholesterol lowering medication for any patient with either diabetes and/or vascular disease, aiming for a LDL goal under  100 for sure, ideally under 70, using whatever dose of statin tolerated.    Plan: lovastatin (MEVACOR) 40 MG tablet, Lipid panel         reflex to direct LDL Fasting, AST, ALT            (Z79.2) Prophylactic antibiotic  Comment: Has previously been told to take antibiotic for dental cleanings, however latest guidelines from [employer] orthopedic surgeons no longer recommend prophylactic antibiotics before dental procedures after joint replacements.  Plan: amoxicillin (AMOXIL) 500 MG capsule               Patient has been advised of split billing requirements and indicates understanding: Yes  COUNSELING:  Reviewed preventive health counseling, as reflected in patient instructions       Regular exercise       Healthy diet/nutrition       Vision screening       Hearing screening       Dental care       Bladder control    Estimated body mass index is 25 kg/m  as calculated from the following:    Height as of this encounter: 1.524 m (5').    Weight as of 11/13/19: 58.1 kg (128 lb).        She reports that she quit smoking about 73 years ago. She has never used smokeless tobacco.      Appropriate preventive services were discussed with this patient, including applicable screening as appropriate for cardiovascular disease, diabetes, osteopenia/osteoporosis, and glaucoma.  As appropriate for age/gender, discussed screening for colorectal cancer, prostate cancer, breast cancer, and cervical cancer. Checklist reviewing preventive services available has been given to the patient.    Reviewed patients plan of care and provided an AVS. The  myriam Jeter meets the Care Plan requirement. This Care Plan has been established and reviewed with the .    Counseling Resources:  ATP IV Guidelines  Pooled Cohorts Equation Calculator  Breast Cancer Risk Calculator  Breast Cancer: Medication to Reduce Risk  FRAX Risk Assessment  ICSI Preventive Guidelines  Dietary Guidelines for Americans, 2010  USDA's MyPlate  ASA Prophylaxis  Lung CA  Screening    Leo Hancock MD  [facility]    Identified Health Risks:

## 2021-03-03 NOTE — PATIENT INSTRUCTIONS
"  * labs today, I will adjust your medications if needed.     *  Continue all medications at the same doses.  Contact your usual pharmacy if you need refills.     *  Return to see me in 6 months, sooner if needed.  Use [employer] or Call [phone] to schedule the appointment with me.       5 GOALS TO PREVENT VASCULAR DISEASE:     1.  Aggressive blood pressure control, under 130/80 ideally.  Using medications if needed.    Your blood pressure is under good control    BP Readings from Last 4 Encounters:   03/03/21 118/74   11/13/19 118/78   07/24/19 122/80   06/21/19 110/80       2.  Aggressive LDL cholesterol (\"bad cholesterol\") lowering as indicated.    Your goal is an LDL under 130 for sure, preferably under 100.  (If you have diabetes or previous vascular disease, the the LDL goals would be under 100 for sure, preferably under 70.)    New guidelines identify four high-risk groups who could benefit from statins:   *people with pre-existing heart disease, such as those who have had a heart attack;   *people ages 40 to 75 who have diabetes of any type  *patients ages 40 to 75 with at least a 7.5% risk of developing cardiovascular disease over the next decade, according to a formula described in the guidelines  *patients with the sort of super-high cholesterol that sometimes runs in families, as evidenced by an LDL of 190 milligrams per deciliter or higher    Your cholesterol levels are well controlled.    Recent Labs   Lab Test 06/17/19  0823 05/22/18  0744 05/12/15  0742 05/12/15  0742 05/19/14  0741   CHOL 170 128   < > 153 161   HDL 62 48*   < > 60 65   LDL 83 61   < > 71 81   TRIG 124 97   < > 111 77   CHOLHDLRATIO  --   --   --  2.6 2.5    < > = values in this interval not displayed.       3.  Aggressive diabetic prevention, screening and/or management.      You do not have diabetes as of the most recent blood tests.     4.  No smoking    5.  Consider daily preventative aspirin over age 50 if you have enough " cardiac risk factors to place you at higher risk for the presence of vascular disease.    If you have any reason not to take aspirin such easy bruising or bleeding, stomach problems, other anticoagulant medications, or any other side effects, then you should not take Aspirin.      --Continue to take the Clopidogrel (plavix) once per day.       --Do NOT take daily aspirin.             Preventive Health Recommendations  Female Ages 75+    Yearly exam: See your health care provider every year in order to  o Review health changes.   o Discuss preventive care.    o Review your medicines if your doctor has prescribed any.      Get a Pap test every three years (unless you have an abnormal result and your provider advises testing more often).    No more PAP smear needed.      You do not need a Pap test if your uterus was removed (hysterectomy) and you have not had cancer.    You should be tested each year for STDs (sexually transmitted diseases) if you're at risk.     Routine screening mammogram no longer indicated.    No routine screening colonoscopy indiacted     Have a cholesterol test every 5 years, or more often if advised.    Have a diabetes test (fasting glucose) every three years. If you are at risk for diabetes, you should have this test more often.     If you are at risk for osteoporosis (brittle bone disease), think about having a bone density scan (DEXA).    Shots:   *  Get a flu shot each year.   *  Get a tetanus shot every 10 years.    *  Pneumonia vaccine up to date.      Nutrition:     Eat at least 5 servings of fruits and vegetables each day.    Eat whole-grain bread, whole-wheat pasta and brown rice instead of white grains and rice.    Talk to your provider about Calcium and Vitamin D.    --Calcium: aim for 1200 mg per day (any brand is fine)   --Vitamin D3 at least 1000 units per day (any brand is fine)       --Good Grains:  Oats, brown rice, Quinoa (these do not raise the blood sugar as much)     --Bad  "grains:  Anything made from wheat or white rice     (because these raise the blood sugars significantly, and the possible gluten issue from wheat for some people).      --Proteins:  Aim for \"lean proteins\" including chicken, fish, seafood, pork, turkey, and eggs (in moderation); Eat red meat only occasionally        Lifestyle    Exercise at least 150 minutes a week (30 minutes a day, 5 days a week). This will help you control your weight and prevent disease.    Limit alcohol to one drink per day.    No smoking.     Wear sunscreen to prevent skin cancer.     See your dentist every six months for an exam and cleaning.    See your eye doctor every 1 to 2 years.            Patient Education   Personalized Prevention Plan  You are due for the preventive services outlined below.  Your care team is available to assist you in scheduling these services.  If you have already completed any of these items, please share that information with your care team to update in your medical record.  Health Maintenance Due   Topic Date Due     Basic Metabolic Panel  05/11/2020     Cholesterol Lab  06/17/2020     FALL RISK ASSESSMENT  07/24/2020     Liver Monitoring Lab  11/11/2020     PHQ-2  01/01/2021        "

## 2021-05-10 ENCOUNTER — HOSPITAL ENCOUNTER (OUTPATIENT)
Facility: CLINIC | Age: 86
Setting detail: OBSERVATION
Discharge: HOME | End: 2021-05-12
Attending: EMERGENCY MEDICINE | Admitting: INTERNAL MEDICINE
Payer: MEDICARE

## 2021-05-10 ENCOUNTER — APPOINTMENT (OUTPATIENT)
Dept: CT IMAGING | Facility: CLINIC | Age: 86
End: 2021-05-10
Attending: EMERGENCY MEDICINE
Payer: MEDICARE

## 2021-05-10 DIAGNOSIS — R07.9 ACUTE CHEST PAIN: ICD-10-CM

## 2021-05-10 DIAGNOSIS — N30.01 ACUTE CYSTITIS WITH HEMATURIA: ICD-10-CM

## 2021-05-10 DIAGNOSIS — R31.0 GROSS HEMATURIA: ICD-10-CM

## 2021-05-10 DIAGNOSIS — I69.959 HEMIPLEGIA OF DOMINANT SIDE AS LATE EFFECT FOLLOWING CEREBROVASCULAR DISEASE (H): ICD-10-CM

## 2021-05-10 DIAGNOSIS — Z76.89 HEALTH CARE HOME: Primary | ICD-10-CM

## 2021-05-10 DIAGNOSIS — I69.920 APHASIA, LATE EFFECT OF CEREBROVASCULAR DISEASE: ICD-10-CM

## 2021-05-10 LAB
ALBUMIN SERPL-MCNC: 3 G/DL (ref 3.4–5)
ALBUMIN UR-MCNC: 30 MG/DL
ALP SERPL-CCNC: 92 U/L (ref 40–150)
ALT SERPL W P-5'-P-CCNC: 18 U/L (ref 0–50)
ANION GAP SERPL CALCULATED.3IONS-SCNC: 4 MMOL/L (ref 3–14)
APPEARANCE UR: ABNORMAL
AST SERPL W P-5'-P-CCNC: 26 U/L (ref 0–45)
BACTERIA #/AREA URNS HPF: ABNORMAL /HPF
BASOPHILS # BLD AUTO: 0.1 10E9/L (ref 0–0.2)
BASOPHILS NFR BLD AUTO: 0.8 %
BILIRUB SERPL-MCNC: 0.5 MG/DL (ref 0.2–1.3)
BILIRUB UR QL STRIP: NEGATIVE
BUN SERPL-MCNC: 12 MG/DL (ref 7–30)
CALCIUM SERPL-MCNC: 9.2 MG/DL (ref 8.5–10.1)
CHLORIDE SERPL-SCNC: 101 MMOL/L (ref 94–109)
CO2 SERPL-SCNC: 29 MMOL/L (ref 20–32)
COLOR UR AUTO: ABNORMAL
CREAT SERPL-MCNC: 0.51 MG/DL (ref 0.52–1.04)
DIFFERENTIAL METHOD BLD: ABNORMAL
EOSINOPHIL # BLD AUTO: 0.6 10E9/L (ref 0–0.7)
EOSINOPHIL NFR BLD AUTO: 4.9 %
ERYTHROCYTE [DISTWIDTH] IN BLOOD BY AUTOMATED COUNT: 12.1 % (ref 10–15)
GFR SERPL CREATININE-BSD FRML MDRD: 84 ML/MIN/{1.73_M2}
GLUCOSE SERPL-MCNC: 93 MG/DL (ref 70–99)
GLUCOSE UR STRIP-MCNC: NEGATIVE MG/DL
HCT VFR BLD AUTO: 43.4 % (ref 35–47)
HGB BLD-MCNC: 14.3 G/DL (ref 11.7–15.7)
HGB UR QL STRIP: ABNORMAL
IMM GRANULOCYTES # BLD: 0.1 10E9/L (ref 0–0.4)
IMM GRANULOCYTES NFR BLD: 0.8 %
INTERPRETATION ECG - MUSE: NORMAL
KETONES UR STRIP-MCNC: NEGATIVE MG/DL
LABORATORY COMMENT REPORT: NORMAL
LEUKOCYTE ESTERASE UR QL STRIP: ABNORMAL
LIPASE SERPL-CCNC: 134 U/L (ref 73–393)
LYMPHOCYTES # BLD AUTO: 1.6 10E9/L (ref 0.8–5.3)
LYMPHOCYTES NFR BLD AUTO: 13.6 %
MCH RBC QN AUTO: 30 PG (ref 26.5–33)
MCHC RBC AUTO-ENTMCNC: 32.9 G/DL (ref 31.5–36.5)
MCV RBC AUTO: 91 FL (ref 78–100)
MONOCYTES # BLD AUTO: 1.1 10E9/L (ref 0–1.3)
MONOCYTES NFR BLD AUTO: 9.6 %
NEUTROPHILS # BLD AUTO: 8.4 10E9/L (ref 1.6–8.3)
NEUTROPHILS NFR BLD AUTO: 70.3 %
NITRATE UR QL: NEGATIVE
NRBC # BLD AUTO: 0 10*3/UL
NRBC BLD AUTO-RTO: 0 /100
NT-PROBNP SERPL-MCNC: 692 PG/ML (ref 0–1800)
PH UR STRIP: 8 PH (ref 5–7)
PLATELET # BLD AUTO: 380 10E9/L (ref 150–450)
POTASSIUM SERPL-SCNC: 4.5 MMOL/L (ref 3.4–5.3)
PROT SERPL-MCNC: 7.2 G/DL (ref 6.8–8.8)
RBC # BLD AUTO: 4.76 10E12/L (ref 3.8–5.2)
RBC #/AREA URNS AUTO: >182 /HPF (ref 0–2)
SARS-COV-2 RNA RESP QL NAA+PROBE: NEGATIVE
SODIUM SERPL-SCNC: 134 MMOL/L (ref 133–144)
SOURCE: ABNORMAL
SP GR UR STRIP: 1.01 (ref 1–1.03)
SPECIMEN SOURCE: NORMAL
SQUAMOUS #/AREA URNS AUTO: 0 /HPF (ref 0–1)
TROPONIN I SERPL-MCNC: <0.015 UG/L (ref 0–0.04)
UROBILINOGEN UR STRIP-MCNC: 0 MG/DL (ref 0–2)
WBC # BLD AUTO: 11.9 10E9/L (ref 4–11)
WBC #/AREA URNS AUTO: 15 /HPF (ref 0–5)

## 2021-05-10 PROCEDURE — C9803 HOPD COVID-19 SPEC COLLECT: HCPCS

## 2021-05-10 PROCEDURE — 87186 SC STD MICRODIL/AGAR DIL: CPT | Performed by: EMERGENCY MEDICINE

## 2021-05-10 PROCEDURE — 250N000009 HC RX 250: Performed by: EMERGENCY MEDICINE

## 2021-05-10 PROCEDURE — 87086 URINE CULTURE/COLONY COUNT: CPT | Performed by: EMERGENCY MEDICINE

## 2021-05-10 PROCEDURE — 80053 COMPREHEN METABOLIC PANEL: CPT | Performed by: EMERGENCY MEDICINE

## 2021-05-10 PROCEDURE — 36415 COLL VENOUS BLD VENIPUNCTURE: CPT | Performed by: INTERNAL MEDICINE

## 2021-05-10 PROCEDURE — 84484 ASSAY OF TROPONIN QUANT: CPT | Performed by: INTERNAL MEDICINE

## 2021-05-10 PROCEDURE — 93005 ELECTROCARDIOGRAM TRACING: CPT

## 2021-05-10 PROCEDURE — 250N000011 HC RX IP 250 OP 636: Performed by: INTERNAL MEDICINE

## 2021-05-10 PROCEDURE — 96374 THER/PROPH/DIAG INJ IV PUSH: CPT | Mod: 59

## 2021-05-10 PROCEDURE — 87635 SARS-COV-2 COVID-19 AMP PRB: CPT | Performed by: EMERGENCY MEDICINE

## 2021-05-10 PROCEDURE — 99285 EMERGENCY DEPT VISIT HI MDM: CPT | Mod: 25

## 2021-05-10 PROCEDURE — G0378 HOSPITAL OBSERVATION PER HR: HCPCS

## 2021-05-10 PROCEDURE — 250N000013 HC RX MED GY IP 250 OP 250 PS 637: Performed by: INTERNAL MEDICINE

## 2021-05-10 PROCEDURE — 84484 ASSAY OF TROPONIN QUANT: CPT | Mod: 91 | Performed by: INTERNAL MEDICINE

## 2021-05-10 PROCEDURE — 81001 URINALYSIS AUTO W/SCOPE: CPT | Performed by: EMERGENCY MEDICINE

## 2021-05-10 PROCEDURE — 84484 ASSAY OF TROPONIN QUANT: CPT | Performed by: EMERGENCY MEDICINE

## 2021-05-10 PROCEDURE — 250N000011 HC RX IP 250 OP 636: Performed by: EMERGENCY MEDICINE

## 2021-05-10 PROCEDURE — 71260 CT THORAX DX C+: CPT | Mod: MG

## 2021-05-10 PROCEDURE — 83880 ASSAY OF NATRIURETIC PEPTIDE: CPT | Performed by: EMERGENCY MEDICINE

## 2021-05-10 PROCEDURE — 99220 PR INITIAL OBSERVATION CARE,LEVEL III: CPT | Performed by: INTERNAL MEDICINE

## 2021-05-10 PROCEDURE — 87088 URINE BACTERIA CULTURE: CPT | Performed by: EMERGENCY MEDICINE

## 2021-05-10 PROCEDURE — 85025 COMPLETE CBC W/AUTO DIFF WBC: CPT | Performed by: EMERGENCY MEDICINE

## 2021-05-10 PROCEDURE — 83690 ASSAY OF LIPASE: CPT | Performed by: EMERGENCY MEDICINE

## 2021-05-10 RX ORDER — NITROGLYCERIN 0.4 MG/1
0.4 TABLET SUBLINGUAL EVERY 5 MIN PRN
Status: DISCONTINUED | OUTPATIENT
Start: 2021-05-10 | End: 2021-05-12 | Stop reason: HOSPADM

## 2021-05-10 RX ORDER — ASPIRIN 81 MG/1
81 TABLET ORAL DAILY
Status: ON HOLD | COMMUNITY
End: 2021-05-12

## 2021-05-10 RX ORDER — PRAVASTATIN SODIUM 40 MG
40 TABLET ORAL AT BEDTIME
Status: DISCONTINUED | OUTPATIENT
Start: 2021-05-10 | End: 2021-05-12 | Stop reason: HOSPADM

## 2021-05-10 RX ORDER — CEFTRIAXONE 1 G/1
1 INJECTION, POWDER, FOR SOLUTION INTRAMUSCULAR; INTRAVENOUS ONCE
Status: COMPLETED | OUTPATIENT
Start: 2021-05-10 | End: 2021-05-10

## 2021-05-10 RX ORDER — CLOPIDOGREL BISULFATE 75 MG/1
75 TABLET ORAL DAILY
Status: DISCONTINUED | OUTPATIENT
Start: 2021-05-11 | End: 2021-05-12 | Stop reason: HOSPADM

## 2021-05-10 RX ORDER — ACETAMINOPHEN 650 MG/1
650 SUPPOSITORY RECTAL EVERY 4 HOURS PRN
Status: DISCONTINUED | OUTPATIENT
Start: 2021-05-10 | End: 2021-05-12 | Stop reason: HOSPADM

## 2021-05-10 RX ORDER — ACETAMINOPHEN 325 MG/1
650 TABLET ORAL EVERY 4 HOURS PRN
Status: DISCONTINUED | OUTPATIENT
Start: 2021-05-10 | End: 2021-05-12 | Stop reason: HOSPADM

## 2021-05-10 RX ORDER — MAGNESIUM HYDROXIDE/ALUMINUM HYDROXICE/SIMETHICONE 120; 1200; 1200 MG/30ML; MG/30ML; MG/30ML
30 SUSPENSION ORAL EVERY 4 HOURS PRN
Status: DISCONTINUED | OUTPATIENT
Start: 2021-05-10 | End: 2021-05-12 | Stop reason: HOSPADM

## 2021-05-10 RX ORDER — IOPAMIDOL 755 MG/ML
80 INJECTION, SOLUTION INTRAVASCULAR ONCE
Status: COMPLETED | OUTPATIENT
Start: 2021-05-10 | End: 2021-05-10

## 2021-05-10 RX ORDER — NAPROXEN SODIUM 220 MG
220 TABLET ORAL DAILY
Status: ON HOLD | COMMUNITY
End: 2021-05-12

## 2021-05-10 RX ORDER — ACETAMINOPHEN 500 MG
1000 TABLET ORAL EVERY 6 HOURS PRN
Status: DISCONTINUED | OUTPATIENT
Start: 2021-05-10 | End: 2021-05-12 | Stop reason: HOSPADM

## 2021-05-10 RX ORDER — CEFUROXIME AXETIL 500 MG/1
500 TABLET ORAL EVERY 12 HOURS SCHEDULED
Status: DISCONTINUED | OUTPATIENT
Start: 2021-05-11 | End: 2021-05-12 | Stop reason: HOSPADM

## 2021-05-10 RX ORDER — HYDRALAZINE HYDROCHLORIDE 20 MG/ML
10 INJECTION INTRAMUSCULAR; INTRAVENOUS
Status: DISCONTINUED | OUTPATIENT
Start: 2021-05-10 | End: 2021-05-12 | Stop reason: HOSPADM

## 2021-05-10 RX ORDER — LORATADINE 10 MG/1
10 TABLET ORAL DAILY
Status: DISCONTINUED | OUTPATIENT
Start: 2021-05-11 | End: 2021-05-12 | Stop reason: HOSPADM

## 2021-05-10 RX ORDER — LIDOCAINE 40 MG/G
CREAM TOPICAL
Status: DISCONTINUED | OUTPATIENT
Start: 2021-05-10 | End: 2021-05-12 | Stop reason: HOSPADM

## 2021-05-10 RX ADMIN — CEFTRIAXONE SODIUM 1 G: 1 INJECTION, POWDER, FOR SOLUTION INTRAMUSCULAR; INTRAVENOUS at 14:17

## 2021-05-10 RX ADMIN — PRAVASTATIN SODIUM 40 MG: 40 TABLET ORAL at 23:33

## 2021-05-10 RX ADMIN — OMEPRAZOLE 40 MG: 20 CAPSULE, DELAYED RELEASE ORAL at 13:54

## 2021-05-10 RX ADMIN — SODIUM CHLORIDE 80 ML: 9 INJECTION, SOLUTION INTRAVENOUS at 09:37

## 2021-05-10 RX ADMIN — IOPAMIDOL 80 ML: 755 INJECTION, SOLUTION INTRAVENOUS at 09:36

## 2021-05-10 ASSESSMENT — MIFFLIN-ST. JEOR: SCORE: 908.5

## 2021-05-10 NOTE — H&P
[facility]    History and Physical - Hospitalist Service       Date of Admission:  5/10/2021    Assessment & Plan   Steffany Brown is a 89 year old female with a history of stroke and right-sided hemiplegia with expressive aphasia, hypertension, and occasional reflux treated with TUMS occasionally at home who presents via EMS with chest pain.     Chest pain.  History is very hard to get with expressive aphasia and her roommate seeming to have some mild dementia.  Naproxen is on her home med list although they are not sure if she is taking it which could be a risk factor for possible ulcer.  She does have some intermittent reflux which may be a cause.  No tachycardia or shortness of breath to suggest pulmonary embolus.  Her last echocardiogram was in 2016 and was normal.  -[place] for observation with serial troponins and a stress Lexiscan tomorrow  -Hold atenolol for stress test  -Continue home Plavix, discontinued her aspirin which her PCP says she should not be on but does seem to be on her home med list.  We will need to make sure this is discontinued at discharge.  -As needed nitro if chest pain recurs  -Start omeprazole 40 mg daily and discontinue naproxen in case this could be an ulcer or reflux, Maalox ordered as needed as well, plan to discontinue naproxen at discharge  -Continue Tylenol as needed for pain    Hypertensive urgency.  Initial blood pressure in the field was 220 systolic.  Blood pressures in the ED were 120s to 170s.  Elevated blood pressures could be reactive from her episode of pain or could be causing her chest pain.  -Monitor blood pressures in observation every 4 hours  -Hold home atenolol for planned stress test, plan to restart home atenolol after stress test  -IV hydralazine every 2 hours for systolic blood pressures greater than 180  -If blood pressures continue to be high consider starting lisinopril 10 mg daily tomorrow    Mild gross hematuria with  probably urinary tract infection.  Urinalysis shows greater than 182 RBCs and 15 WBCs with trace leukocyte esterase and moderate bacteria.  Urine grossly shows mild pink tinge to the urine.  -Treat for urinary tract infection with 1 dose of ceftriaxone and then cefuroxime twice daily for 6 additional days  -Follow-up with primary care physician to make sure the urine clears with treatment for UTI  -Follow-up urine culture results    History of stroke with right hemiplegia and expressive aphasia.  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  CT abdomen showed short-segment dissection flap at the left external iliac artery that is only 0.9 cm in size as well as a penetrating ulcer at the most distal aspect of the descending thoracic aorta measuring 1.5 cm. Both these were confirmed to be chronic by radiology.  -no additional investigation or treatment needed unless symptoms occur     Diet:   Regular diet  DVT Prophylaxis: Pneumatic Compression Devices  Boland Catheter: not present  Code Status:   DNR/DNI      Disposition Plan   Expected discharge: Tomorrow, recommended to prior living arrangement once stress test negative and chest pain symptoms resolve..  Entered: Matthew Crowder MD 05/10/2021, 12:38 PM     The patient's care was discussed with the patient's POA who is her roommate.    Matthew Crowder MD  [facility]  Contact information available via [facility] Paging/Directory      ______________________________________________________________________    Chief Complaint   Chest pain    History is obtained from the patient, electronic health record and patient's caregiver. History limited by patient's  expressive aphasia.    History of Present Illness   Steffany Brown is a 89 year old female with a history of stroke and right-sided hemiplegia with expressive aphasia, hypertension, and occasional reflux treated with TUMS occasionally at home who presents via EMS with chest pain. Patient's roommate called EMS because patient was making noises and holding her chest in pain about an hour prior to arrival to the ED.  She is not mobile and her roommate helps with all transfers to the wheel chair.  She was drinking some coffee this morning but prior to this episode of chest pain.  Her roommate seems to have some memory problems of her own. Her roommate says she has not had any recent vomiting, fevers, cough or additional issues as far as she could tell. Upon EMS arrival, patient again reported chest pain and BP was in the 220s. EMS gave 2 nitroglycerin and an aspirin. She had another episode of chest pain in the ED but it then resolved. She currently seems to say she is chest pain free although her roommate notes that she usually says yes when she means no and no when she means yes.  After an extended conversation it seems that she is currently not having chest pain but did have a severe episode this morning. When asking about the pink urine with mild hematuria noted the roommate says she is not aware of any hematuria in the past. Unclear if patient is taking aspirin but it is on the med list although PCP notes say she should only be taking the plavix and not aspirin.  Also unclear if she is taking the naproxen as her roommate says she is not sure if she takes that or not.    Evaluation in the ED showed hypertension in the 170's, EKG with old LBBB, mildly elevated WBC of 11.9, negative troponin, negative BNP, negative lipase and LFT's, and a urinalysis with > 182 WBC and 15 RBC's. CT aortic survey showed no acute thoracic or abdominal aortic dissection or aneurysm. There was a short dissection of the left external  iliac artery measuring 0.9 cm that was chronic. 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 Other incidental findings were mild bibasilar pulmonary edema, moderate coronary artery calcifications, and lobulated fluid adjacent to the left shoulder joints that may be  reactive to degenerative arthropathy. There is also likely chronic left hip superior subluxation and degenerative change. She had a previous normal Echo on 2016.    Review of Systems    Review of systems limited due to patient factors - expressive aphasia. No significant findings.    Past Medical History    I have reviewed this patient's medical history and updated it with pertinent information if needed.   Past Medical History:   Diagnosis Date     Age-related macular degeneration, dry, both eyes 10/24/14    per vitreoretinal specialists     Cerebral infarction (H)     aphasia and chronic right sided waekness     Diverticulosis of colon (without mention of hemorrhage)     Several diverticuli seen on colonoscopy     Eczema      Family history of colon cancer     mother  from colon cancer age 89     Hip fracture, intertrochanteric (H) 9/10/09    left hip fracture, S/P ORIF     History of small bowel obstruction ,      Osteoporosis, unspecified      Other and unspecified hyperlipidemia      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     Other speech disturbance 3/05    chronic dysarthria since CVI     Pelvic mass in female 10/3/11    s/p LEODAN/BSO; pathology:  Serous cystadenofibroma, 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     Personal history of colonic polyps      Psoriasis      Rheumatoid arthritis(714.0)      Unspecified essential hypertension        Past Surgical History   I have reviewed this patient's surgical history and updated it with pertinent information if  needed.  Past Surgical History:   Procedure Laterality Date     C APPENDECTOMY       COLONOSCOPY  03    Diverticulosis, single small polyp removed (at [facility])     COLONOSCOPY  08    diverticulosis, few scattered mild angioectasias, no polyps     COLONOSCOPY  2013    Procedure: COLONOSCOPY;  COLONOSCOPY ;  Surgeon: Romario Watters MD;  Location:  GI     D & C       FRACTURE TX, HIP RT/LT  9/10/09    Open reduction with IM nailing of left hip fracture using a gamma nail     HC COLONOSCOPY THRU STOMA W BIOPSY/CAUTERY TUMOR/POLYP/LESION  3/00    colonoscopy adenamatous polyp ([facility])     HYSTERECTOMY, LEODAN  10/03/11    s/p LEODAN/BSO to remove pelvic mass; pathology:  Serous cystadenofibroma, 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     OPEN REDUCTION INTERNAL FIXATION ANKLE  2014    Procedure: OPEN REDUCTION INTERNAL FIXATION ANKLE;  Surgeon: Emmanuel Gomez MD;  Location: [facility] OR     OPEN REDUCTION INTERNAL FIXATION HIP NAILING Right 2016    Procedure: OPEN REDUCTION INTERNAL FIXATION HIP NAILING;  Surgeon: Jeronimo Giordano MD;  Location:  OR     RECTAL SURGERY  1971    anterior laproscopic repair of sigmoid prolapse     SMALL INTESTINE SURGERY       STRESS ECHO ([facility])      stress echo (negative)     SURGICAL HISTORY OF -   3/12/05    attempted angioplasty/stent of right middle cerebral artery ( M1 segment), no effective       Social History   I have reviewed this patient's social history and updated it with pertinent information if needed.  Social History     Tobacco Use     Smoking status: Former Smoker     Quit date: 1948     Years since quittin.4     Smokeless tobacco: Never Used   Substance Use Topics     Alcohol use: Yes     Comment: rare     Drug use: No       Family History   I have reviewed this patient's family history and updated it with pertinent information if needed.  Family History   Problem Relation Age of Onset     Cancer Mother          of  colon ca age 89     Cancer - colorectal Mother      Cardiovascular Father          at age 65     Cancer Brother          of lung ca age 65       Prior to Admission Medications   Prior to Admission Medications   Prescriptions Last Dose Informant Patient Reported? Taking?   acetaminophen (TYLENOL) 500 MG tablet   Yes Yes   Sig: Take 500-1,000 mg by mouth every 6 hours as needed    aspirin 81 MG EC tablet 5/10/2021 at Unknown time  Yes Yes   Sig: Take 81 mg by mouth daily   atenolol (TENORMIN) 50 MG tablet 5/10/2021 at Unknown time  No Yes   Sig: Take 1 tablet (50 mg) by mouth daily   clopidogrel (PLAVIX) 75 MG tablet 5/10/2021 at Unknown time  No Yes   Sig: Take 1 tablet (75 mg) by mouth daily   loratadine (CLARITIN) 10 MG tablet 5/10/2021 at Unknown time Other Yes Yes   Sig: Take 10 mg by mouth daily   lovastatin (MEVACOR) 40 MG tablet 2021 at Unknown time  No Yes   Sig: Take 1 tablet (40 mg) by mouth At Bedtime   naproxen sodium (ANAPROX) 220 MG tablet 5/10/2021 at Unknown time  Yes Yes   Sig: Take 220 mg by mouth daily      Facility-Administered Medications: None     Allergies   Allergies   Allergen Reactions     Atorvastatin Calcium Cramps     Leg cramps     Lisinopril Swelling and Rash     Seasonal Allergies        Physical Exam   Vital Signs: Temp: 98.2  F (36.8  C) Temp src: Oral BP: (!) 176/75 Pulse: 65   Resp: 23 SpO2: 95 % O2 Device: None (Room air)    Weight: 125 lbs 0 oz  Constitutional: Awake, alert, cooperative, no apparent distress.  Eyes: Conjunctiva and pupils examined and normal.  HEENT: Moist mucous membranes, normal dentition.  Respiratory: Clear to auscultation bilaterally, no crackles or wheezing.  Cardiovascular: Regular rate and rhythm, normal S1 and S2, and no murmur noted.  GI: Soft, non-distended, non-tender, normal bowel sounds.  Lymph/Hematologic: No anterior cervical or supraclavicular adenopathy.  Skin: No rashes, no cyanosis, no edema.  Musculoskeletal: No joint swelling,  erythema or tenderness.  Neurologic: right hemiplegia with 0/5 strength but is able to wiggle toes on the right, cranial nerves 2-12 intact with expressive aphasia, normal strength on the left but seems overall week from deconditioning.  Psychiatric: Alert, orientation not able to be tested because of expressive aphasia, no obvious anxiety or depression.    Data   Data reviewed today: I reviewed all medications, new labs and imaging results over the last 24 hours. I personally reviewed the EKG tracing showing old LBBB.    Recent Labs   Lab 05/10/21  0828   WBC 11.9*   HGB 14.3   MCV 91         POTASSIUM 4.5   CHLORIDE 101   CO2 29   BUN 12   CR 0.51*   ANIONGAP 4   NICA 9.2   GLC 93   ALBUMIN 3.0*   PROTTOTAL 7.2   BILITOTAL 0.5   ALKPHOS 92   ALT 18   AST 26   LIPASE 134   TROPI <0.015     Recent Results (from the past 24 hour(s))   CT Aortic Survey w Contrast    Narrative    CT AORTIC SURVEY WITH CONTRAST  5/10/2021 9:48 AM    HISTORY:  Abdominal pain, aortic dissection suspected.    TECHNIQUE: CT scan obtained of the chest, abdomen, and pelvis with IV  contrast. Post contrast scanning performed during the arterial phase.  CT chest also obtained without IV contrast.  80mL [id number]-[id number] IV  injected. Sagittal and coronal reformatted images acquired from the  source post contrast data. Radiation dose for this scan was reduced  using automated exposure control, adjustment of the mA and/or kV  according to patient size, or iterative reconstruction technique.    COMPARISON:  Chest x-ray 1/11/2017.    FINDINGS:  Thoracic and abdominal aorta: No evidence for dissection involving the  thoracic or abdominal aorta. Scattered mild calcifications. However,  there is a 0.9 cm tiny flap within the lumen of the left external  iliac artery, series 5 image 130, that may be a very small dissection.  There is patency of the main takeoff branch vessels from the thoracic  and abdominal aorta. There is a focal penetrating  ulcer at the most  distal aspect of the descending thoracic aorta that is 1.5 cm, series  5 image 69. The aorta at this location is 2.2 cm. There is no evidence  for thoracic or abdominal aortic aneurysm. Scattered moderate coronary  artery calcifications.    Other vascular: No pulmonary embolism is identified.    Chest: Mild patchy groundglass opacities at the lower lungs  bilaterally. No effusions. No worrisome focal airspace disease  identified. Some mild atelectasis along the right middle lobe base  medially. No enlarged lymph nodes are seen. There is fluid surrounding  the left shoulder joint that is 6.7 cm, series 5 image 7.    Abdomen/pelvis: No acute abnormality involving the liver, spleen,  adrenals, pancreas, or kidneys. No bowel obstruction. Postoperative  change of the bowel. Small fluid distends a few distal small bowel  loops. No free fluid or free air.    Bilateral proximal femoral surgical change. Subluxation is likely  chronic at the left hip joint associated with degenerative change.  Spine diffuse degenerative changes.      Impression    IMPRESSION:   1. No acute thoracic or abdominal aortic dissection or aneurysm. No  periaortic hematoma is seen.  2. 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  3. 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  4. There may be mild bibasilar pulmonary edema.  5. Moderate coronary artery calcifications.  6. Lobulated fluid adjacent to the left shoulder joints may be  reactive to degenerative arthropathy. There is also likely chronic  left hip superior subluxation and degenerative change.

## 2021-05-10 NOTE — PHARMACY-ADMISSION MEDICATION HISTORY
Pharmacy Medication History  Admission medication history interview status for the 5/10/2021  admission is complete. See EPIC admission navigator for prior to admission medications     Location of Interview: Patient room  Medication history sources: Patient's family/friend (roommate)    Significant changes made to the medication list:  Added aspirin and naproxen   Removed multivitamin, vitamin D, amoxicillin    Additional medication history information:   Pt's roommate states that the pt has been taking a daily baby aspirin, but she is unsure if the pt should be taking it or not. The pt's after visit summary from 3/3/21 office visit says not to take aspirin and to continue on Plavix alone.     Medication reconciliation completed by provider prior to medication history? No    Time spent in this activity: 10 minutes    Prior to Admission medications    Medication Sig Last Dose Taking? Auth Provider   acetaminophen (TYLENOL) 500 MG tablet Take 500-1,000 mg by mouth every 6 hours as needed   Yes Reported, Patient   aspirin 81 MG EC tablet Take 81 mg by mouth daily 5/10/2021 at Unknown time Yes Unknown, Entered By History   atenolol (TENORMIN) 50 MG tablet Take 1 tablet (50 mg) by mouth daily 5/10/2021 at Unknown time Yes Leo Hancock MD   clopidogrel (PLAVIX) 75 MG tablet Take 1 tablet (75 mg) by mouth daily 5/10/2021 at Unknown time Yes Leo Hancock MD   loratadine (CLARITIN) 10 MG tablet Take 10 mg by mouth daily 5/10/2021 at Unknown time Yes Reported, Patient   lovastatin (MEVACOR) 40 MG tablet Take 1 tablet (40 mg) by mouth At Bedtime 5/9/2021 at Unknown time Yes Leo Hancock MD   naproxen sodium (ANAPROX) 220 MG tablet Take 220 mg by mouth daily 5/10/2021 at Unknown time Yes Unknown, Entered By History       The information provided in this note is only as accurate as the sources available at the time of update(s)

## 2021-05-10 NOTE — LETTER
Transition Communication Hand-off for Care Transitions to Next Level of Care Provider    Name: Steffany Brown  : 1930  MRN #: [id number]  Primary Care Provider: Leo Hancock     Primary Clinic: [address] [facility] [address]     Reason for Hospitalization:  Gross hematuria [R31.0]  Acute chest pain [R07.9]  Admit Date/Time: 5/10/2021  7:54 AM  Discharge Date: 21  Payor Source: Payor: MEDICARE / Plan: MEDICARE / Product Type: Medicare /          Reason for Communication Hand-off Referral: Other GI F/u and Needs post hospital f/u with you.  We are concerened for home safty due to Kamini the pt's caregiver and roommates very poor short term memory.  Apolinarjean is obviously still caring the Steffany's physical need very well.  Steffany was well groomed and her skin is in excellent shape for being wheelchair bound.  We did order home care RN/PT/OT/SW.   We felt it would be in Fariba's best interest moving forward to transition to AL so they have support when Kamini's cognition worsens.      Discharge Plan:  Discharge Plan:      Most Recent Value   Disposition Comments  Kamini decided they would like a Medical transportation ride home for the pt.  Our SW Radha was able to arrange a ride for today.  LVM with the pt's Niece listed in Epic.           Concern for non-adherence with plan of care:   Y/N Yes due to pt being Aphasic and roommate caregiver Kamini having almost no Short Term Memory  Discharge Needs Assessment:  Needs      Most Recent Value   # of Referrals Placed by [facility]  Transportation   Other Resources  Transportation Services   Transportation Agency  [facility]            Follow-up specialty is recommended: Yes Dr. Susan CASTANO makes their own appointments    Follow-up plan:    Future Appointments   Date Time Provider Department Center   2021  6:30 AM Kailey Naranjo OT [facility]       Any outstanding tests or procedures:        Referrals     Future Labs/Procedures    Care  Coordination Referral     Comments:    Services are provided by a Care Coordinator for people with complex needs such as: medical, social, or financial troubles.  The Care Coordinator works with the patient and their Primary Care Provider to determine health goals, obtain resources, achieve outcomes, and develop care plans that help coordinate the patient's care.     Reason for Referral: please see inpatient care coordination note, provide support as able    Additional pertinent details:  patient has expressive aphasia; please see face sheet for contacts     Clinical Staff have discussed the Care Coordination Referral with the patient and/or caregiver: yes    Home care nursing referral     Comments:    RN skilled nursing visit. RN to assess vital signs and weight, respiratory and cardiac status, pain level and activity tolerance, hydration, nutrition and bowel status and home safety.  RN to teach medication management.    You will be discharge home with [facility].  [facility] will contact you directly to arrange the first visit.      [facility]'s phone number is [phone].    Home Care OT Referral for Hospital Discharge     Comments:    OT to eval and treat    Your provider has ordered home care - occupational therapy. If you have not been contacted within 2 days of your discharge please call the department phone number listed on the top of this document.    Home Care PT Referral for Hospital Discharge     Comments:    PT to eval and treat    Your provider has ordered home care - physical therapy. If you have not been contacted within 2 days of your discharge please call the department phone number listed on the top of this document.    Home Care Social Service Referral for Hospital Discharge     Comments:     to assist with assessing for home needs including caregiver support, home safety, community and financial resources.    Your provider  has ordered home care - . If you have not been contacted within 2 days of your discharge please call the department phone number listed on the top of this document.            Key Recommendations:  See Reason for handoff    Thank you,    Miri Walsh RN, Care Coordination [facility]    AVS/Discharge Summary is the source of truth; this is a helpful guide for improved communication of patient story

## 2021-05-10 NOTE — ED NOTES
[facility]  ED Nurse Handoff Report    ED Chief complaint: Chest Pain      ED Diagnosis:   Final diagnoses:   None       Code Status:not addressed   Allergies:   Allergies   Allergen Reactions     Atorvastatin Calcium Cramps     Leg cramps     Lisinopril Swelling and Rash     Seasonal Allergies        Patient Story: HPI:   history of stroke and right-sided hemiplegia with expressive aphasia who presents via EMS with chest pain. Patient's roommate called EMS because patient was making noises and holding her chest in pain about an hour prior to arrival. No vomiting or additional issues as far as she could tell. Upon EMS arrival, patient again reported chest pain and BP was in the 220s. EMS gave 2 nitroglycerin and an aspirin. Here, she denies any chest pain or any other complaints  Focused Assessment: frequent pink tinged urine, aphasic so difficult to assess if this is new, difficult to assess if has CP or if it is better, roommate is here and has dementia   Treatments and/or interventions provided:   Patient's response to treatments and/or interventions:     To be done/followed up on inpatient unit:  unknown     Does this patient have any cognitive concerns?: unable  to assess, seems appropriate to commands     Activity level - Baseline/Home:  Unknown  Activity Level - Current:   Unknown    Patient's Preferred language: English   Needed?: No    Isolation: None  Infection: Not Applicable  Patient tested for COVID 19 prior to admission: YES  Bariatric?: No    Vital Signs:   Vitals:    05/10/21 1000 05/10/21 1015 05/10/21 1030 05/10/21 1045   BP: (!) 182/95 (!) 191/103 (!) 184/79 (!) 176/75   Pulse: 62 67 63 65   Resp: 26 15 10 23   Temp:       TempSrc:       SpO2: 95% 96% 95% 95%   Weight:       Height:           Cardiac Rhythm:     Was the PSS-3 completed:   No -aphasic   What interventions are required if any?               Family Comments: unknown   OBS brochure/video discussed/provided  to patient/family: No              Name of person given brochure if not patient: na              Relationship to patient: na    For the majority of the shift this patient's behavior was Green.   Behavioral interventions performed were na.    ED NURSE PHONE NUMBER: [phone]

## 2021-05-10 NOTE — ED NOTES
Bed: ED26  Expected date:   Expected time:   Means of arrival:   Comments:  [facility] - 89 F chest pain HTN eta 0757

## 2021-05-10 NOTE — ED PROVIDER NOTES
History     Chief Complaint:  Chest Pain     HPI:   Steffany Brown is a 89 year old female with a history of stroke and right-sided hemiplegia with expressive aphasia who presents via EMS with chest pain. Patient's roommate called EMS because patient was making noises and holding her chest in pain about an hour prior to arrival. No vomiting or additional issues as far as she could tell. Upon EMS arrival, patient again reported chest pain and BP was in the 220s. EMS gave 2 nitroglycerin and an aspirin. Here, she denies any chest pain or any other complaints.     Her roommate whom she lives with her the past 90 years and is her DPOA states that she clutched her chest and indicated that she was having chest pain.  She reports that this happens frequently but generally she states she does not want to go to the emergency department.  Today she stated she wanted to go to the emergency department.  Her roommate also has difficulty understanding her because of her expressive aphasia.    History and review of systems limited by expressive aphasia.    Review of Systems   Reason unable to perform ROS: expressive aphasia.     Allergies:  Atorvastatin Calcium  Lisinopril  Seasonal Allergies     Medications:    Atenolol   Plavix  Lovastatin    Past Medical History:    Macular degeneration  Cerebral infarction  Eczema   Diverticulosis   SBO  Hip fracture  Hyperlipidemia   Psoriasis   Rheumatoid arthritis  Colon polyps  Hypertension   Osteoporosis   Ankle fracture  Aphasia, late effect of cerebrovascular disease  Hemiplegia, right     Past Surgical History:    Appendectomy   D & C  ORIF hip  Colonoscopy adenomatous polyp  Hysterectomy   ORIF ankle  Repair sigmoid prolapse  Attempted angioplasty/stent  Small intestine surgery     Family History:    Cancer  Cardiovascular disease    Social History:  Patient presents via EMS with roommate.    Physical Exam     Vitals:  Patient Vitals for the past 24 hrs:   BP Temp Temp src Pulse  Resp SpO2 Height Weight   05/10/21 1318 (!) 193/59 97.5  F (36.4  C) Axillary 61 -- 98 % -- --   05/10/21 1304 (!) 178/90 -- -- 64 20 94 % -- --   05/10/21 1045 (!) 176/75 -- -- 65 23 95 % -- --   05/10/21 1030 (!) 184/79 -- -- 63 10 95 % -- --   05/10/21 1015 (!) 191/103 -- -- 67 15 96 % -- --   05/10/21 1000 (!) 182/95 -- -- 62 26 95 % -- --   05/10/21 0930 -- -- -- 65 (!) 61 98 % -- --   05/10/21 0915 (!) 175/74 -- -- 57 18 95 % -- --   05/10/21 0900 (!) 171/74 -- -- 56 13 93 % -- --   05/10/21 0845 (!) 158/74 -- -- 60 18 95 % -- --   05/10/21 0830 (!) 178/72 -- -- 62 9 93 % -- --   05/10/21 0815 (!) 196/104 -- -- 70 -- -- -- --   05/10/21 0800 (!) 200/93 -- -- 77 -- 95 % -- --   05/10/21 0756 (!) 127/99 98.2  F (36.8  C) Oral 76 16 93 % 1.524 m (5') 56.7 kg (125 lb)       Physical Exam:  General: Well-nourished  Eyes: PERRL, conjunctivae pink no scleral icterus or conjunctival injection  ENT:  Moist mucus membranes, posterior oropharynx clear without erythema or exudates  Respiratory:  Lungs clear to auscultation bilaterally, no crackles/rubs/wheezes.  Good air movement  CV: Normal rate and rhythm, no murmurs/rubs/gallops  GI:  ? Abdominal tenderness. Seems to grimace with palpation.  : Clearwick draining pink tinged urine.  Skin: Warm, dry.  No rashes or petechiae  Musculoskeletal: No peripheral edema or calf tenderness  Neuro: Alert and attempts to respond.  Word salad and significant expressive aphasia.  Does seem to nod head in agreement at times.  Right-sided hemiplegia.    Psychiatric: Unable to assess    Emergency Department Course     ECG:  ECG taken at 08:02:50, ECG read at 0825    NSR.  Left axis deviation  LBBB.    No significant change as compared to prior, dated 1/11/17.    Rate 76 bpm. MA interval 166 ms. QRS duration 126 ms. QT/QTc 424/477 ms. P-R-T axes 71 -31 96.     Imaging:  CT Aortic survey w contrast  1. No acute thoracic or abdominal aortic dissection or aneurysm. No  periaortic hematoma  is seen.  2. 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  3. 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  4. There may be mild bibasilar pulmonary edema.  5. Moderate coronary artery calcifications.  6. Lobulated fluid adjacent to the left shoulder joints may be  reactive to degenerative arthropathy. There is also likely chronic  left hip superior subluxation and degenerative change  Per radiology.    Laboratory:  CBC: WBC 11.9 (H), HGB 14.3,      CMP: Creatinine 0.51 (L) , Albumin 3.0 (L) o/w WNL    Troponin (Collected 0828): <0.015    Lipase: 134    Pro BNP: 692    Urine analysis: Blood large (A), pH 8.0 (H), Albumin 30 (A), Leukocyte esterase trace (A), WBC/HPF 15 (H), RBC/HPF >182 (H), Bacteria moderate (A) o/w WNL    Asymptomatic COVID-19 virus by PCR: negative    Urine culture: pending    Emergency Department Course:  Reviewed:  Past medical records, Care Everywhere, nursing notes, and vitals reviewed.     Assessments:  0758 I performed an exam of the patient and obtained history, as documented above.  1040 Patient rechecked and updated.     Consults:  1044 I spoke with Dr. Lilly of vascular surgery regarding patient's presentation, findings, and plan of care.  1051 I spoke with Dr. Crowder of the hospitalist service regarding patient's presentation, findings, and plan of care.    Disposition:  The patient was admitted to the hospital under the care of Dr. Crowder.     Impression & Plan     CMS Diagnoses:        Covid-19:  Steffany Brown was evaluated during a global COVID-19 pandemic, which necessitated consideration that the patient might be at risk for infection with the SARS-CoV-2 virus that causes COVID-19.   Applicable protocols for evaluation were followed during the patient's care.   COVID-19 was considered as part of the patient's evaluation. The plan for testing is:  a test was  obtained during this visit.    Medical Decision Making:  Steffany Brown is a 89 year old female who presents to the emergency department today for evaluation of with report of chest pain and hypertension.  She also appears to have some mild gross hematuria.  History is quite difficult given her severe expressive aphasia.  Labs reveal possible urinary tract infection with elevated white blood cell count and the hematuria is noted.  She is treated with IV antibiotics cephalexin while urine culture is pending.  She does not appear to be septic.  In terms of her chest pain, we are unable to obtain detailed history.  Given the possibility of abdominal pain as well as the hypertension associated we did obtain a CT aorta study.  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  I spoke with Dr. Chung of vascular surgery who revealed the results and also looked at previous scans.  He suspects these are both very chronic and notes that there is very little we can do for it.  He does not think this is the cause for her pain.  She had been given an aspirin and route.  Given her age and risk factors, we will admit her to the hospital for chest pain rule out and further monitoring.  I spoke with the patient as well as her DPOA and they both seem to give consent for this.  Dr. Crowder graciously agreed admit the patient.    Diagnosis:    ICD-10-CM    1. Acute chest pain  R07.9 Urine Culture Aerobic Bacterial     Asymptomatic SARS-CoV-2 COVID-19 Virus (Coronavirus) by PCR     CANCELED: Asymptomatic SARS-CoV-2 COVID-19 Virus (Coronavirus) by PCR   2. Gross hematuria  R31.0         Scribe Disclosure:  I, Nazanin Weber, am serving as a scribe at 7:58 AM on 5/10/2021 to document services personally performed by Zaynab Cardenas MD based on my observations and the provider's statements to me.       Nazanin Weber  5/10/2021     Zaynab Cardenas MD  05/10/21 5345

## 2021-05-10 NOTE — PROGRESS NOTES
Observation goals PRIOR TO DISCHARGE     Comments: List all goals to be met before discharge home:     - Serial troponins and stress test complete: not met   - Seen and cleared by consultant if applicable: not met   - Adequate pain control on oral analgesia: met   - Vital signs normal or at patient baseline: not met   - Safe disposition plan has been identified: not met     - Nurse to notify provider when observation goals have been met and patient is ready for discharge.

## 2021-05-10 NOTE — PROGRESS NOTES
RECEIVING UNIT ED HANDOFF REVIEW    ED Nurse Handoff Report was reviewed by: Silvina Rivas RN on May 10, 2021 at 12:51 PM

## 2021-05-10 NOTE — ED TRIAGE NOTES
Per EMS, patient's roommate called EMS because patient was seen holding her chest and complaining of chest pain. Upon arrival to ED, patient denies chest pain. EMS gave ASA and nitroglycerin x2.

## 2021-05-10 NOTE — PLAN OF CARE
A&Ox3, disoriented to time. Baseline aphasia from previous stroke. Can answer yes/no questions. VSS on RA ex htn. Purewick in place. Tolerating regular, no caffeine diet and NPO at midnight for NM Lexiscan. Tele: SB w/BBB and inverted T-waves. Denies pain and IV SL. SW consult. Continue to monitor.

## 2021-05-11 ENCOUNTER — APPOINTMENT (OUTPATIENT)
Dept: NUCLEAR MEDICINE | Facility: CLINIC | Age: 86
End: 2021-05-11
Attending: INTERNAL MEDICINE
Payer: MEDICARE

## 2021-05-11 ENCOUNTER — APPOINTMENT (OUTPATIENT)
Dept: CARDIOLOGY | Facility: CLINIC | Age: 86
End: 2021-05-11
Attending: PHYSICIAN ASSISTANT
Payer: MEDICARE

## 2021-05-11 ENCOUNTER — DOCUMENTATION ONLY (OUTPATIENT)
Dept: OTHER | Facility: CLINIC | Age: 86
End: 2021-05-11

## 2021-05-11 LAB
BACTERIA SPEC CULT: ABNORMAL
CV STRESS MAX HR HE: 92
Lab: ABNORMAL
NUC STRESS EJECTION FRACTION: 93 %
RATE PRESSURE PRODUCT: NORMAL
SPECIMEN SOURCE: ABNORMAL
STRESS ECHO BASELINE DIASTOLIC HE: 77
STRESS ECHO BASELINE HR: 67
STRESS ECHO BASELINE SYSTOLIC BP: 169
STRESS ECHO CALCULATED PERCENT HR: 71 %
STRESS ECHO LAST STRESS DIASTOLIC BP: 81
STRESS ECHO LAST STRESS SYSTOLIC BP: 178
STRESS ECHO TARGET HR: 130

## 2021-05-11 PROCEDURE — 99226 PR SUBSEQUENT OBSERVATION CARE,LEVEL III: CPT | Performed by: PHYSICIAN ASSISTANT

## 2021-05-11 PROCEDURE — 78452 HT MUSCLE IMAGE SPECT MULT: CPT | Mod: MG

## 2021-05-11 PROCEDURE — G0378 HOSPITAL OBSERVATION PER HR: HCPCS

## 2021-05-11 PROCEDURE — 250N000011 HC RX IP 250 OP 636: Performed by: INTERNAL MEDICINE

## 2021-05-11 PROCEDURE — 93016 CV STRESS TEST SUPVJ ONLY: CPT | Performed by: INTERNAL MEDICINE

## 2021-05-11 PROCEDURE — G1004 CDSM NDSC: HCPCS | Performed by: INTERNAL MEDICINE

## 2021-05-11 PROCEDURE — 99207 PR MOONLIGHTING INDICATOR: CPT | Performed by: PHYSICIAN ASSISTANT

## 2021-05-11 PROCEDURE — 255N000002 HC RX 255 OP 636: Performed by: INTERNAL MEDICINE

## 2021-05-11 PROCEDURE — 999N000049 HC STATISTIC ECHO STRESS OR NM NPI

## 2021-05-11 PROCEDURE — 99203 OFFICE O/P NEW LOW 30 MIN: CPT | Mod: 25 | Performed by: INTERNAL MEDICINE

## 2021-05-11 PROCEDURE — 250N000013 HC RX MED GY IP 250 OP 250 PS 637: Performed by: PHYSICIAN ASSISTANT

## 2021-05-11 PROCEDURE — A9502 TC99M TETROFOSMIN: HCPCS | Performed by: RADIOLOGY

## 2021-05-11 PROCEDURE — 78452 HT MUSCLE IMAGE SPECT MULT: CPT | Mod: 26 | Performed by: INTERNAL MEDICINE

## 2021-05-11 PROCEDURE — 93005 ELECTROCARDIOGRAM TRACING: CPT | Mod: 59

## 2021-05-11 PROCEDURE — 343N000001 HC RX 343: Performed by: RADIOLOGY

## 2021-05-11 PROCEDURE — 93017 CV STRESS TEST TRACING ONLY: CPT

## 2021-05-11 PROCEDURE — 999N000208 ECHOCARDIOGRAM COMPLETE

## 2021-05-11 PROCEDURE — 250N000013 HC RX MED GY IP 250 OP 250 PS 637: Performed by: INTERNAL MEDICINE

## 2021-05-11 PROCEDURE — 93306 TTE W/DOPPLER COMPLETE: CPT | Mod: 26 | Performed by: INTERNAL MEDICINE

## 2021-05-11 PROCEDURE — 93018 CV STRESS TEST I&R ONLY: CPT | Mod: MG | Performed by: INTERNAL MEDICINE

## 2021-05-11 RX ORDER — AMLODIPINE BESYLATE 5 MG/1
5 TABLET ORAL DAILY
Status: DISCONTINUED | OUTPATIENT
Start: 2021-05-11 | End: 2021-05-11

## 2021-05-11 RX ORDER — ACYCLOVIR 200 MG/1
0-1 CAPSULE ORAL
Status: DISCONTINUED | OUTPATIENT
Start: 2021-05-11 | End: 2021-05-12 | Stop reason: HOSPADM

## 2021-05-11 RX ORDER — ALBUTEROL SULFATE 90 UG/1
2 AEROSOL, METERED RESPIRATORY (INHALATION) EVERY 5 MIN PRN
Status: DISCONTINUED | OUTPATIENT
Start: 2021-05-11 | End: 2021-05-11

## 2021-05-11 RX ORDER — ATENOLOL 50 MG/1
50 TABLET ORAL DAILY
Status: DISCONTINUED | OUTPATIENT
Start: 2021-05-11 | End: 2021-05-11

## 2021-05-11 RX ORDER — CAFFEINE CITRATE 20 MG/ML
60 SOLUTION INTRAVENOUS
Status: DISCONTINUED | OUTPATIENT
Start: 2021-05-11 | End: 2021-05-11

## 2021-05-11 RX ORDER — ATENOLOL 50 MG/1
50 TABLET ORAL 2 TIMES DAILY
Status: DISCONTINUED | OUTPATIENT
Start: 2021-05-11 | End: 2021-05-12 | Stop reason: HOSPADM

## 2021-05-11 RX ORDER — AMINOPHYLLINE 25 MG/ML
50-100 INJECTION, SOLUTION INTRAVENOUS
Status: DISCONTINUED | OUTPATIENT
Start: 2021-05-11 | End: 2021-05-11

## 2021-05-11 RX ORDER — REGADENOSON 0.08 MG/ML
0.4 INJECTION, SOLUTION INTRAVENOUS ONCE
Status: COMPLETED | OUTPATIENT
Start: 2021-05-11 | End: 2021-05-11

## 2021-05-11 RX ADMIN — CEFUROXIME AXETIL 500 MG: 500 TABLET ORAL at 07:33

## 2021-05-11 RX ADMIN — CEFUROXIME AXETIL 500 MG: 500 TABLET ORAL at 20:07

## 2021-05-11 RX ADMIN — ATENOLOL 50 MG: 50 TABLET ORAL at 13:41

## 2021-05-11 RX ADMIN — LORATADINE 10 MG: 10 TABLET ORAL at 07:33

## 2021-05-11 RX ADMIN — OMEPRAZOLE 40 MG: 20 CAPSULE, DELAYED RELEASE ORAL at 07:25

## 2021-05-11 RX ADMIN — REGADENOSON 0.4 MG: 0.08 INJECTION, SOLUTION INTRAVENOUS at 11:24

## 2021-05-11 RX ADMIN — PRAVASTATIN SODIUM 40 MG: 40 TABLET ORAL at 22:01

## 2021-05-11 RX ADMIN — CLOPIDOGREL BISULFATE 75 MG: 75 TABLET ORAL at 07:33

## 2021-05-11 RX ADMIN — TETROFOSMIN 25.5 MCI.: 1.38 INJECTION, POWDER, LYOPHILIZED, FOR SOLUTION INTRAVENOUS at 11:27

## 2021-05-11 RX ADMIN — TETROFOSMIN 9.2 MCI.: 1.38 INJECTION, POWDER, LYOPHILIZED, FOR SOLUTION INTRAVENOUS at 08:45

## 2021-05-11 RX ADMIN — HUMAN ALBUMIN MICROSPHERES AND PERFLUTREN 9 ML: 10; .22 INJECTION, SOLUTION INTRAVENOUS at 15:45

## 2021-05-11 ASSESSMENT — ACTIVITIES OF DAILY LIVING (ADL)
DEPENDENT_IADLS:: CLEANING;COOKING;LAUNDRY;SHOPPING;MEAL PREPARATION;MEDICATION MANAGEMENT;MONEY MANAGEMENT;TRANSPORTATION;INCONTINENCE

## 2021-05-11 NOTE — PLAN OF CARE
Alert, RICKY Orientation d/t severe aphasia, answers yes/no questions appropriately. AVSS- HTN within parameters. 1 episode chest pain which resolved on its own, see EKG. Tele SB with BBB. NPO since midnight. Not OOB, t/r with ax1. Purewick in place. PIV SL. Plan for Lexiscan stress test today. Continue to monitor.

## 2021-05-11 NOTE — CONSULTS
[facility]    Cardiology Consultation     Date of Admission:  5/10/2021    Assessment & Plan   Steffany Brown is a 89 year old female who was admitted on 5/10/2021.    Atypical chest discomfort with negative stress test.  Hypertensive emergency.  History of stroke  Hematuria    Plan and recommendations  1. Given age and comorbidities hematuria and stress test findings, I would not recommend further work-up at this time.  Symptoms have subsided.    2. Would recommend getting echocardiogram. Call us is abnormal.  3. CT aorta reviewed, although non gated, do not see significant high risk disease.  4. Please call us with clinical changes.    Waqas Calle MD    Primary Care Physician   Leo Hancock    Reason for Consult   Reason for consult: I was asked by medicine team to evaluate this patient for chest pain.    History of Present Illness   Steffany Brown is a 89 year old female who presents with chest pain.  This a 89-year-old patient.  She is very frail.  She has a history of stroke hemiplegia and expressive aphasia.  She has history of hypertension.  No prior coronary disease that she knows of.  She has a history of left bundle branch block. Echo in 2016 normal EF and moderate PH, likely from diastolic dysfunction.  She has a history of hematuria.  She came in with systolic blood pressure of 220.  She was complaining of chest discomfort by pointing towards her chest.  Troponins were negative.  No heart failure symptoms.  Chest pain resolved during the hospital stay without any intervention.  She had nuclear stress test which was somewhat technically difficult but was reported to be probably normal.  Cardiology was consulted.  EF was reported to be hyperdynamic at 93% on the nuclear study.  No further active chest pain.    Patient Active Problem List   Diagnosis     Rheumatoid arthritis (H)     Osteoporosis     Benign essential hypertension     History of colonic polyps      Family history of malignant neoplasm of gastrointestinal tract     Other generalized ischemic cerebrovascular disease     Other speech disturbance     Hyposmolality and/or hyponatremia     Pain in joint, shoulder region     Aftercare for healing traumatic fracture of upper arm     Aftercare for healing traumatic fracture of upper leg     Other postprocedural status(V45.89)     Psoriasis     Hyperlipidemia LDL goal <100     Allergic rhinitis     Eczema     Family history of colon cancer     Trimalleolar fracture of ankle, closed     Aphasia, late effect of cerebrovascular disease     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)     Closed right ankle fracture     Physical deconditioning     Health Care Home     Age-related macular degeneration, dry, both eyes     Status post closed fracture of right hip     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)     Right ankle pain     Gross hematuria     Acute chest pain       Past Medical History   I have reviewed this patient's medical history and updated it with pertinent information if needed.   Past Medical History:   Diagnosis Date     Age-related macular degeneration, dry, both eyes 10/24/14    per vitreoretinal specialists     Cerebral infarction (H)     aphasia and chronic right sided waekness     Diverticulosis of colon (without mention of hemorrhage)     Several diverticuli seen on colonoscopy     Eczema      Family history of colon cancer     mother  from colon cancer age 89     Hip fracture, intertrochanteric (H) 9/10/09    left hip fracture, S/P ORIF     History of small bowel obstruction ,      Osteoporosis, unspecified      Other and unspecified hyperlipidemia      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     Other speech disturbance 3/05    chronic dysarthria since CVI     Pelvic mass in female 10/3/11    s/p LEODAN/BSO;  pathology:  Serous cystadenofibroma, 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     Personal history of colonic polyps 2000     Psoriasis      Rheumatoid arthritis(714.0)      Unspecified essential hypertension        Past Surgical History   I have reviewed this patient's surgical history and updated it with pertinent information if needed.  Past Surgical History:   Procedure Laterality Date     C APPENDECTOMY  1985     COLONOSCOPY  5/14/03    Diverticulosis, single small polyp removed (at [facility])     COLONOSCOPY  6/26/08    diverticulosis, few scattered mild angioectasias, no polyps     COLONOSCOPY  7/17/2013    Procedure: COLONOSCOPY;  COLONOSCOPY ;  Surgeon: Romario Watters MD;  Location:  GI     [facility]       FRACTURE TX, HIP RT/LT  9/10/09    Open reduction with IM nailing of left hip fracture using a gamma nail     HC COLONOSCOPY THRU STOMA W BIOPSY/CAUTERY TUMOR/POLYP/LESION  3/00    colonoscopy adenamatous polyp ([facility])     HYSTERECTOMY, LEODAN  10/03/11    s/p LEODAN/BSO to remove pelvic mass; pathology:  Serous cystadenofibroma, 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     OPEN REDUCTION INTERNAL FIXATION ANKLE  7/11/2014    Procedure: OPEN REDUCTION INTERNAL FIXATION ANKLE;  Surgeon: Emmanuel Gomez MD;  Location: [facility] OR     OPEN REDUCTION INTERNAL FIXATION HIP NAILING Right 7/20/2016    Procedure: OPEN REDUCTION INTERNAL FIXATION HIP NAILING;  Surgeon: Jeronimo Giordano MD;  Location:  OR     RECTAL SURGERY  1971    anterior laproscopic repair of sigmoid prolapse     SMALL INTESTINE SURGERY       STRESS ECHO ([facility])  4/98    stress echo (negative)     SURGICAL HISTORY OF -   3/12/05    attempted angioplasty/stent of right middle cerebral artery ( M1 segment), no effective       Prior to Admission Medications   Prior to Admission Medications   Prescriptions Last Dose Informant Patient Reported? Taking?   acetaminophen (TYLENOL) 500 MG tablet   Yes Yes   Sig: Take 500-1,000 mg by mouth every 6 hours as needed     aspirin 81 MG EC tablet 5/10/2021 at Unknown time  Yes Yes   Sig: Take 81 mg by mouth daily   atenolol (TENORMIN) 50 MG tablet 5/10/2021 at Unknown time  No Yes   Sig: Take 1 tablet (50 mg) by mouth daily   clopidogrel (PLAVIX) 75 MG tablet 5/10/2021 at Unknown time  No Yes   Sig: Take 1 tablet (75 mg) by mouth daily   loratadine (CLARITIN) 10 MG tablet 5/10/2021 at Unknown time Other Yes Yes   Sig: Take 10 mg by mouth daily   lovastatin (MEVACOR) 40 MG tablet 2021 at Unknown time  No Yes   Sig: Take 1 tablet (40 mg) by mouth At Bedtime   naproxen sodium (ANAPROX) 220 MG tablet 5/10/2021 at Unknown time  Yes Yes   Sig: Take 220 mg by mouth daily      Facility-Administered Medications: None     Current Facility-Administered Medications   Medication Dose Route Frequency     atenolol  50 mg Oral Daily     cefuroxime  500 mg Oral Q12H ABHILASH     clopidogrel  75 mg Oral Daily     loratadine  10 mg Oral Daily     omeprazole  40 mg Oral QAM AC     pravastatin  40 mg Oral At Bedtime     sodium chloride (PF)  3 mL Intracatheter Q8H     Current Facility-Administered Medications   Medication Last Rate     Allergies   Allergies   Allergen Reactions     Atorvastatin Calcium Cramps     Leg cramps     Lisinopril Swelling and Rash     Seasonal Allergies        Social History    reports that she quit smoking about 73 years ago. She has never used smokeless tobacco. She reports current alcohol use. She reports that she does not use drugs.    Family History   Family History   Problem Relation Age of Onset     Cancer Mother          of colon ca age 89     Cancer - colorectal Mother      Cardiovascular Father          at age 65     Cancer Brother          of lung ca age 65       Review of Systems   The comprehensive 10 point Review of Systems is negative other than noted in the HPI or here.     Physical Exam   Vital Signs with Ranges  Temp:  [95.9  F (35.5  C)-98.3  F (36.8  C)] 96.8  F (36  C)  Pulse:  [61-90] 90  Resp:   [16-18] 16  BP: (132-189)/(52-74) 159/59  SpO2:  [93 %-99 %] 95 %  Wt Readings from Last 4 Encounters:   05/10/21 56.7 kg (125 lb)   11/13/19 58.1 kg (128 lb)   07/24/19 58.1 kg (128 lb)   06/21/19 58.1 kg (128 lb)     I/O last 3 completed shifts:  In: -   Out: 650 [Urine:650]      Vitals: BP (!) 159/59 (BP Location: Left arm)   Pulse 90   Temp 96.8  F (36  C) (Oral)   Resp 16   Ht 1.524 m (5')   Wt 56.7 kg (125 lb)   LMP  (LMP Unknown)   SpO2 95%   BMI 24.41 kg/m      General alert oriented x3 no acute distress.  Neck is supple JVP is normal lungs clear.  Cardiac sounds reveal a soft systolic murmur.  S2 is preserved.  Extremities warm without edema.  Abdomen nontender.    Recent Labs   Lab 05/10/21  1900 05/10/21  1346 05/10/21  0828   TROPI <0.015 <0.015 <0.015       Recent Labs   Lab 05/10/21  1900 05/10/21  1346 05/10/21  0828   WBC  --   --  11.9*   HGB  --   --  14.3   MCV  --   --  91   PLT  --   --  380   NA  --   --  134   POTASSIUM  --   --  4.5   CHLORIDE  --   --  101   CO2  --   --  29   BUN  --   --  12   CR  --   --  0.51*   GFRESTIMATED  --   --  84   GFRESTBLACK  --   --  >90   ANIONGAP  --   --  4   NICA  --   --  9.2   GLC  --   --  93   ALBUMIN  --   --  3.0*   PROTTOTAL  --   --  7.2   BILITOTAL  --   --  0.5   ALKPHOS  --   --  92   ALT  --   --  18   AST  --   --  26   LIPASE  --   --  134   TROPI <0.015 <0.015 <0.015     Recent Labs   Lab Test 03/03/21  1143 06/17/19  0823 05/12/15  0742 05/12/15  0742 05/19/14  0741   CHOL 209* 170   < > 153 161   HDL 55 62   < > 60 65   * 83   < > 71 81   TRIG 128 124   < > 111 77   CHOLHDLRATIO  --   --   --  2.6 2.5    < > = values in this interval not displayed.     Recent Labs   Lab 05/10/21  0828   WBC 11.9*   HGB 14.3   HCT 43.4   MCV 91        No results for input(s): PH, PHV, PO2, PO2V, SAT, PCO2, PCO2V, HCO3, HCO3V in the last 168 hours.  Recent Labs   Lab 05/10/21  0828   NTBNPI 692     No results for input(s): DD in the last  168 hours.  No results for input(s): SED, CRP in the last 168 hours.  Recent Labs   Lab 05/10/21  0828        No results for input(s): TSH in the last 168 hours.  Recent Labs   Lab 05/10/21  0828   COLOR Light Brown   APPEARANCE Cloudy   URINEGLC Negative   URINEBILI Negative   URINEKETONE Negative   SG 1.011   UBLD Large*   URINEPH 8.0*   PROTEIN 30*   NITRITE Negative   LEUKEST Trace*   RBCU >182*   WBCU 15*       Imaging:  Recent Results (from the past 48 hour(s))   CT Aortic Survey w Contrast    Narrative    CT AORTIC SURVEY WITH CONTRAST  5/10/2021 9:48 AM    HISTORY:  Abdominal pain, aortic dissection suspected.    TECHNIQUE: CT scan obtained of the chest, abdomen, and pelvis with IV  contrast. Post contrast scanning performed during the arterial phase.  CT chest also obtained without IV contrast.  80mL Isovue-370 IV  injected. Sagittal and coronal reformatted images acquired from the  source post contrast data. Radiation dose for this scan was reduced  using automated exposure control, adjustment of the mA and/or kV  according to patient size, or iterative reconstruction technique.    COMPARISON:  Chest x-ray 1/11/2017.    FINDINGS:  Thoracic and abdominal aorta: No evidence for dissection involving the  thoracic or abdominal aorta. Scattered mild calcifications. However,  there is a 0.9 cm tiny flap within the lumen of the left external  iliac artery, series 5 image 130, that may be a very small dissection.  There is patency of the main takeoff branch vessels from the thoracic  and abdominal aorta. There is a focal penetrating ulcer at the most  distal aspect of the descending thoracic aorta that is 1.5 cm, series  5 image 69. The aorta at this location is 2.2 cm. There is no evidence  for thoracic or abdominal aortic aneurysm. Scattered moderate coronary  artery calcifications.    Other vascular: No pulmonary embolism is identified.    Chest: Mild patchy groundglass opacities at the lower  lungs  bilaterally. No effusions. No worrisome focal airspace disease  identified. Some mild atelectasis along the right middle lobe base  medially. No enlarged lymph nodes are seen. There is fluid surrounding  the left shoulder joint that is 6.7 cm, series 5 image 7.    Abdomen/pelvis: No acute abnormality involving the liver, spleen,  adrenals, pancreas, or kidneys. No bowel obstruction. Postoperative  change of the bowel. Small fluid distends a few distal small bowel  loops. No free fluid or free air.    Bilateral proximal femoral surgical change. Subluxation is likely  chronic at the left hip joint associated with degenerative change.  Spine diffuse degenerative changes.      Impression    IMPRESSION:   1. No acute thoracic or abdominal aortic dissection or aneurysm. No  periaortic hematoma is seen.  2. 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  3. 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  4. There may be mild bibasilar pulmonary edema.  5. Moderate coronary artery calcifications.  6. Lobulated fluid adjacent to the left shoulder joints may be  reactive to degenerative arthropathy. There is also likely chronic  left hip superior subluxation and degenerative change.    SARKIS TORRES MD   NM Lexiscan stress test (nuc card)   Result Value    Target     Baseline Systolic     Baseline Diastolic BP 77    Last Stress Systolic     Last Stress Diastolic BP 81    Baseline HR 67    Max HR 92    Calculated Percent HR 71    Rate Pressure Product 16,376.0    Left Ventricular EF 93    Addendum: 5/11/2021         The nuclear stress test is probably negative for inducible myocardial ischemia or infarction.     Left ventricular function is hyperdynamic.     The left ventricular ejection fraction at stress is 93%.     Left ventricular hypertrophy is noted.     There is no prior study for  comparison.     Nuclear Study Quality: The sensitivity of this test is reduced due to poor spatial resolution.        Narrative       The nuclear stress test is probably abnormal.     Left ventricular function is hyperdynamic.     The left ventricular ejection fraction at stress is 93%.     Left ventricular hypertrophy is noted.     There is no prior study for comparison.     Nuclear Study Quality: The sensitivity of this test is reduced due to   poor spatial resolution.         Echo:  No results found for this or any previous visit (from the past 4320 hour(s)).

## 2021-05-11 NOTE — PROGRESS NOTES
"[facility]  Hospitalist Progress Note  Date of Service (when I saw the patient): 05/11/2021    Summary:  Steffany Brown is a 89 year old year old female who has a PMH significant for of stroke and right-sided hemiplegia with expressive aphasia, hypertension, and occasional reflux treated with TUMS occasionally at home. Pt was admitted to [facility] on 5/10/2021 after presenting with complaints of chest pain.  The following problems were addressed during her hospitalization:    Assessment & Plan:  Chest pain.  History is very hard to get with expressive aphasia and her roommate seeming to have some mild dementia.  Naproxen is on her home med list although they are not sure if she is taking it which could be a risk factor for possible ulcer.  She does have some intermittent reflux which may be a cause.  No tachycardia or shortness of breath to suggest pulmonary embolus.  Her last echocardiogram was in 2016 and was normal. Serial troponins negative  -Lexiscan 5/11 results clarified and \"probably negative for inducible myocardial ischemia or infarction\"  ---Initial typo with Aarti results as \"probably abnormal.\" Cardiology consulted and have corrected Aarti scan as above  ---Cardiology recommending Echo, and have signed off. Re-consult if echo abnormal. Appreciate cardiology recs  -----Echo pending  -PTA Atenolol held for stress test, resumed this afternoon. BPs still elevated so dose increased to 50mg BID  -Continue home Plavix, discontinued her aspirin which her PCP says she should not be on but does seem to be on her home med list.  We will need to make sure this is discontinued at discharge.  -As needed nitro if chest pain recurs  -Start omeprazole 40 mg daily and discontinue naproxen in case this could be an ulcer or reflux, Maalox ordered as needed as well, plan to discontinue naproxen at discharge  -Continue Tylenol as needed for pain     Hypertensive urgency.  " Initial blood pressure in the field was 220 systolic.  Blood pressures in the ED were 120s to 170s.  Elevated blood pressures could be reactive from her episode of pain or could be causing her chest pain.  -Monitor blood pressures in observation every 4 hours  -Held home atenolol for planned stress test, restarted home atenolol after stress test. Still with Hypertension, so increased to Atenolol 50mg BID  -IV hydralazine every 2 hours for systolic blood pressures greater than 180  -If blood pressures continue to be high consider again increasing Atenolol or  starting another agent. Note Lisinopril allergy of swelling and rash listed in chart, so will avoid for now     Mild gross hematuria with probably urinary tract infection.  Urinalysis shows greater than 182 RBCs and 15 WBCs with trace leukocyte esterase and moderate bacteria.  Urine grossly shows mild pink tinge to the urine.  -Treat for urinary tract infection with 1 dose of ceftriaxone and then cefuroxime twice daily for 6 additional days (day 1/6 today)  -UC with ,000 E coli, sensitivities pending  -Follow-up with primary care physician to make sure the urine clears with treatment for UTI  -Follow-up urine culture results     History of stroke with right hemiplegia and expressive aphasia.  -Continue home Plavix  -Gets all her cares from her roommate who seems to be having some mild dementia and we will likely need some social work intervention soon   ---SW consulted for home safety eval and discharge planning  ---OT consulted foe cognitive screening given above communication difficulties. May benefit from PT and further home services vs TCU?  ---Appreciate RNCC clarification of POA status and family contacts. 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  CT abdomen showed short-segment dissection flap at the left external iliac  artery that is only 0.9 cm in size as well as a penetrating ulcer at the most distal aspect of the descending thoracic aorta measuring 1.5 cm. Both these were confirmed to be chronic by radiology.  -No additional investigation or treatment needed unless symptoms occur    DVT Prophylaxis: Pneumatic Compression Devices  Code Status: No CPR- Do NOT Intubate  Disposition: Expected discharge in 1 days once cardiology work-up complete and safe disposition plan in place.    The patient's care was discussed with the Attending Physician, Dr. Roberts, Bedside Nurse, Care Coordinator/, and Patient.    Sue Ware PA-C      Interval History   Expressive aphasia severe. Able to very understandably answer yes/no questions, but other verbal answers unable to be accurately interpreted. Can write some answers, but handwriting difficult to read at times. Denies chest pain today. Denies fevers, chills, nausea/vomiting. No problems with voiding. Roommate at bedside this afternoon supportive, answers questions as able but forgetful.     -Data reviewed today: I reviewed all new labs and imaging results over the last 24 hours. I personally reviewed no images or EKG's today.    Physical Exam   Temp: 96.8  F (36  C) Temp src: Oral BP: (!) 159/59 Pulse: 90   Resp: 16 SpO2: 95 % O2 Device: None (Room air)    Vitals:    05/10/21 0756   Weight: 56.7 kg (125 lb)     Vital Signs with Ranges  Temp:  [95.9  F (35.5  C)-98.3  F (36.8  C)] 96.8  F (36  C)  Pulse:  [61-90] 90  Resp:  [16-18] 16  BP: (132-189)/(52-74) 159/59  SpO2:  [93 %-99 %] 95 %  I/O last 3 completed shifts:  In: -   Out: 650 [Urine:650]    Constitutional: alert, oriented to self, no acute distres  Eyes: No scleral icterus or injection  ENT: Normocephalic, atraumatic, moist mucus membranes  Respiratory: No secondary muscle use or labored breathing. Lungs clear to auscultation bilaterally without wheezes or rhonchi   Cardiovascular: RRR with soft systolic murmur  GI:  Abdomen soft, non-tender to palpation, non-distended.  Bowel sounds active  MSK: able to move extremities on command without weakness, walks without weakness or ataxia  Skin/Integumen: warm, dry, in tact without rash or hives  Lymph/Hematologic: No anterior cervical or supraclavicular adenopathy.  Skin: No rashes, no cyanosis, no edema.  Musculoskeletal: No joint swelling, erythema or tenderness.  Neurologic: right hemiplegia with 0/5 strength but is able to wiggle toes on the right, cranial nerves 2-12 intact with expressive aphasia, normal strength on the left but seems overall week from deconditioning.  Psychiatric: Alert, orientation not able to be tested because of expressive aphasia, no obvious anxiety or depression    Medications       atenolol  50 mg Oral Daily     cefuroxime  500 mg Oral Q12H ABHILASH     clopidogrel  75 mg Oral Daily     loratadine  10 mg Oral Daily     omeprazole  40 mg Oral QAM AC     pravastatin  40 mg Oral At Bedtime     sodium chloride (PF)  3 mL Intracatheter Q8H       Data   Recent Labs   Lab 05/10/21  1900 05/10/21  1346 05/10/21  0828   WBC  --   --  11.9*   HGB  --   --  14.3   MCV  --   --  91   PLT  --   --  380   NA  --   --  134   POTASSIUM  --   --  4.5   CHLORIDE  --   --  101   CO2  --   --  29   BUN  --   --  12   CR  --   --  0.51*   ANIONGAP  --   --  4   NICA  --   --  9.2   GLC  --   --  93   ALBUMIN  --   --  3.0*   PROTTOTAL  --   --  7.2   BILITOTAL  --   --  0.5   ALKPHOS  --   --  92   ALT  --   --  18   AST  --   --  26   LIPASE  --   --  134   TROPI <0.015 <0.015 <0.015       Recent Results (from the past 24 hour(s))   NM Lexiscan stress test (nuc card)   Result Value    Target     Baseline Systolic     Baseline Diastolic BP 77    Last Stress Systolic     Last Stress Diastolic BP 81    Baseline HR 67    Max HR 92    Calculated Percent HR 71    Rate Pressure Product 16,376.0    Left Ventricular EF 93    Addendum: 5/11/2021         The nuclear stress  test is probably negative for inducible myocardial ischemia or infarction.     Left ventricular function is hyperdynamic.     The left ventricular ejection fraction at stress is 93%.     Left ventricular hypertrophy is noted.     There is no prior study for comparison.     Nuclear Study Quality: The sensitivity of this test is reduced due to poor spatial resolution.        Narrative       The nuclear stress test is probably abnormal.     Left ventricular function is hyperdynamic.     The left ventricular ejection fraction at stress is 93%.     Left ventricular hypertrophy is noted.     There is no prior study for comparison.     Nuclear Study Quality: The sensitivity of this test is reduced due to   poor spatial resolution.

## 2021-05-11 NOTE — ACP (ADVANCE CARE PLANNING)
Next of Kin     Patient is with expressive aphasia and there was concern noted that her longtime friend and roommate Kamini has some cognitive troubles.  Kamini is pt's POA (financial); next of kin is needed in the event medical decision making is needed.  Patient verified by provider and staff to be able to answer yes/no questions well.        CM-RN explained that, in the event patient is unable to make medical decisions, we do want to have the correct next-of-kin names on file, in addition to her wonderful longtime family friend roommate POFRANCY Vogel ([phone]).      CM-RN reviewed public documents and determined pt's family relatives, wrote them out and asked pt to answer yes/no:     1) you had a brother Peter? ()--YES  2) your brother had a son Brian, who was your nephew? ()--YES (friend says spelling was Kevin)   3) your brother had daughters Joy, Francia, Jodee and Maxim, who are your nieces? (alive)--YES      * Joy (Jairon) Darrel, Francia (Carlo) Noah, and Jodee (Tato) Greg all live in [place], [place]      * Maxim (Mik) Jaimee lives in [place], [place]       Per [employer], contact information is:     Joy (Jairon) Darrel  Phone: [phone]  Address: [address] [place] [address]    Francia (Carlo) Noah  Phone: [phone]  Address: [address] [place] [address]    Jodee (Tato) Greg   Phone: [phone] / [phone]  Address: [address] [place] [address]    Maxim (Mik) Jaimee  Phone: [phone] / [phone]  Address: [address] [place] [address]    LISS contacted Niece Joy who answered at the above number.  She knows her aunt Steffany well and has been talking with the POA/roommate Kamini's sister Marjean so was aware of the hospitalization and of the roommates's cognitive decline (of note Kamini's sister Isreal [phone] has been helpful with meals and assisting with video PCP clinic  visit this year).  Cyril Hamilton is happy to be the next of kin contact and prefers cell phone [phone] (landline [phone]) as her contact.  RANDELL-RN provided the hospital unit number and the contact information for the Caregiver Assurance program [phone] for support.    Lilian Phillips RN, BSN, PHN  [facility]  Inpatient Care Management - FLOAT  Mobile: [phone] 05/11/21 until 4pm  (after today's date, please call the patient's unit)

## 2021-05-11 NOTE — CONSULTS
"Care Management Initial Consult    General Information  Assessment completed with: Patient, Family, Friend, patient Steffany, friend Kamini, family niece Joy  Type of CM/SW Visit: Initial Assessment    Primary Care Provider verified and updated as needed: Yes([facility], Dr. Leo Hancock )   Readmission within the last 30 days:        Reason for Consult: discharge planning  Advance Care Planning:    on file in EPIC is a Health Care Directive dated 02/15/1991 in which pt names her wishes but does not name a Health Care Agent; thus in the event pt was unable to make her own medical decisions, decision-making would be deferred to Next of Kin who is Cyril Hamilton [phone]   General Information Comments: verified roommate/friend Kamini is legal POA for financial decisions only     Communication Assessment  Patient's communication style: spoken language (English or Bilingual)    Hearing Difficulty or Deaf: yes   Wear Glasses or Blind: yes    Cognitive  Cognitive/Neuro/Behavioral: .WDL except  Level of Consciousness: confused  Arousal Level: opens eyes spontaneously  Orientation: disoriented to, time  Mood/Behavior: calm, cooperative  Best Language: 2 - Severe aphasia  Speech: slow, garbled    Living Environment:   People in home: friend(s)  Kamini Vogel (financial POA and longtime friend and roommate) phone [phone]; there is also a dog named Ashley   Current living Arrangements: house(2 bed / 1.5 bath single family home )      Able to return to prior arrangements: (TBD)  Living Arrangement Comments: per niece and roommate's sister, there are concerns about roommate Kamini's cognition and ability to continue to care for patient; they note both patient and roommate consume alcohol, which is confirmed by roommate Kamini \"we drink beer\" who identifies there is a liquor store near their home     Family/Social Support:  Care provided by: friend(roommate Kamini plus Kamini's sister Isreal visits " ")  Provides care for:    Marital Status: Single(long term roommate )  Other (specify)(roommate & longtime friend )          Description of Support System: Other (see comments)(supportive and involved but with recent cognitive decline )    Support Assessment: Caregiver difficulty providing physical care/safety(Kamini notes she would appreciate assistance caring for pt)    Current Resources:   Patient receiving home care services: No  Community Resources: None  Equipment currently used at home:  Wheelchair   Supplies currently used at home:      Employment/Financial:  Employment Status: retired     Employment/ Comments: worked in making reservations for [employer]  Financial Concerns: No concerns identified   Finance Comments: Kamini (financial POA) states \"money is not an issue, we can pay for anything\"     Lifestyle & Psychosocial Needs:  Socioeconomic History     Marital status: Single     Spouse name: Not on file     Number of children: 0     Years of education: Not on file     Highest education level: Not on file   Occupational History     Occupation:      Employer: OTHER     Comment: [employer]     Tobacco Use     Smoking status: Former Smoker     Quit date: 1948     Years since quittin.4     Smokeless tobacco: Never Used   Substance and Sexual Activity     Alcohol use: Yes     Comment: rare     Drug use: No     Sexual activity: Never     Functional Status:  Prior to admission patient needed assistance:   Dependent ADLs:: Bathing, Dressing, Grooming, Positioning, Transfers, Wheelchair-with assist, Toileting  Dependent IADLs:: Cleaning, Cooking, Laundry, Shopping, Meal Preparation, Medication Management, Money Management, Transportation, Incontinence  Assesssment of Functional Status: (needs assistance and caregiver needing more support )    Mental Health Status:  Mental Health Status: No Current Concerns       Chemical Dependency Status:  Chemical Dependency " Status: Current Concern(alcohol use noted )       Values/Beliefs:  Spiritual, Cultural Beliefs, Islam Practices, Values that affect care: no          Values/Beliefs Comment: Temple    Additional Information:  Met with patient in room, introduced self and role in discharge planning.  Verified patient's living relatives (nieces Joy, Francia, Jodee, and Maxim who live out of state), contacted Joy and added her to patient contact list as next of kin.  Affirmed longstanding friendship with roommate Kamini who is verified financial power of .      Spoke with leah Hamilton ([phone]  / [phone]) who is in close contact with roommate's sister; they are both concerned about roommate's decline in cognition and they are very appreciative of assistance with safe discharge planning and notification of role as next of kin.  Joy is in support of hospital evaluation of patient by therapies as able to aide in safe discharge recommendations.  Joy is in support of discharge with more support such as TCU or Home Health.  She notes friend Kamini can have a strong personality.  She notes both pt and pt's friend drink alcohol.  She believes home health may have been involved for assist with bathing patient.      Updated contact list on facesheet and on profile; safe discharge plan is in progress.     Caregiver   Will someone help with your care after discharge? yes   Name of designated caregiver Kamini Vogel longtime roommate and POA phone [phone]   Phone number of caregiver Leah Hamilton [phone] is next of kin (lives in [place])    Caregiver address (same as patient) Kamini's sister Isreal [phone] also helps      Met with roommate Kamini in room a second time.  Nursing staff have noted Kamini will ask repeated questions in as little as three minutes.  However she is able to provide information and opinions that may be helpful for discharge planning.  I explained we would  "have therapy evaluate and provide a recommendation--my best guess is that they would suggest Home Health or TCU.  She is in support of Home Health being involved; \"we've had it before--they tend to want to teach me how to do things; I can't do everything anymore, but she doesn't want to pay for it and plus is less comfortable with help that isn't from me.\"  She is less enthusiastic about the idea of TCU but \"if we have, to we have to.\"   I told her we can talk about that if the recommendation is made.  She notes both she and pt share the PCP Dr. Hancock and her sister helped with a recent video visit.  She would vote for using [facility] if homecare is recommended.  CM-RN acknowledged roommate's longtime care of patient and caregiver burden.       Called pt's leah Hamilton to update: we will be watching for therapy's recommendations after evaluation of physical and cognitive abilities as able.  Reviewed conversation with roommate.  Provided medicare.gov resource for finding home care and TCUs within nursing home and explained what both of these were.  If home, would advise including RN & SW in addition to therapies.  If TCU, social work there would follow the case.  I will place a clinic Care Coordinator referral for clinic to follow as well.  Leah plans to utilize Caregiver Assurance program as a resource and support as well.     Lilian Phillips RN, BSN, PHN  [facility]  Inpatient Care Management - FLOAT  Mobile: [phone] 05/11/21 until 4pm  (after today's date, please call the patient's unit)       1:38 PM addendum, assisted bedside nurse in writing a note for pt's roommate / designated visitor / POA as she has had repeated questions:    5/11/21    Medical update: Steffany had pictures taken of her heart, the procedure is called a Lexiscan    Discharge update: Steffany is NOT going home today 5/11/21 (Tuesday)    Here is our to-do list (you can check it off as things are " done):    __ test results from heart tests  __ therapy evaluation for recommendations   __ care coordinator for safe discharge plan... best guess:           add home care     or    go to transitional care rehab center     This information was given to you by: (bedside nurse signed)

## 2021-05-11 NOTE — PLAN OF CARE
A&Ox3, disoriented to time. Baseline aphasia from previous stroke. Can answer yes/no questions. VSS on RA ex htn. Purewick in place. Tolerating cardiac, no caffeine diet. Tele: SB w/BBB. Denies pain and IV SL. SW/CC and OT consult. Continue to monitor.

## 2021-05-11 NOTE — PROGRESS NOTES
"Consult order converted to Care Management / Social Work IP consult per protocol and acknowledged.  Order note transferred, \"Roommate is POA and seems to have some dementia, roommates family called and noted some memory issues as well. Likely need to make some plans for POA and assess options. Please assist. Home medications also do not seem to be well managed and they could likely use some help. Thanks!\"     Per chart review, there are a couple ACP documents filed:      And a note from [email or web address],  \"Steffany Brown has NO Health Care Agent or Proxy named in a legal Advance Directive document dated 2-15-91. Per policy next of kin is the designated decision maker.Added/Reviewed: 8/1/2016.\"     Will reach out to [employer] to review documents again before proceeding with orders.     ADDENDUM: per [employer], \"Hello, I have reviewed and updated the chart. The patient s Health Care Directive does not name a health care agent to make decisions in the event that the patient becomes incapacitated, so decisions would be deferred to next of kin. The patient contact listed in the chart is named as the POA, however, that is for financial decisions. The POA doc does not specify any medical decision-making power. Thanks!   Alessandra.\"     Lilian Phillips RN, BSN, PHN  [facility]  Inpatient Care Management - FLOAT  Mobile: [phone] 05/11/21 until 4pm  (after today's date, please call the patient's unit)       Definitions    Advance Care Planning: Process of discussing and documenting goals, values, beliefs and choices regarding future health care decisions.      Guardian: Court-appointed adult(s) with authority to make designated decisions for an individual who has been deemed by a  to lack competence to make some or all of their own decisions.      Health Care Agents: Adult(s) appointed in a Health Care Directive by an adult with decisional capacity to make health care " decisions on their behalf if/when the person loses decisional capacity (as determined by a physician).     Health Care Directive: Legal document created by an adult with decisional capacity. Designates health care treatment choices and/or who would make health care decisions if/when the person loses decisional capacity (as determined by a physician).      POLST/Resuscitation Guidelines:  Signed medical orders outlining patient s choices for life-supporting treatment options. POLST is a nationally standardized form. Resuscitation Guidelines is used for all other signed medical orders forms related to life-support choices. Orders are completed following an informed goals of care discussion with the patient or their surrogate decision maker and are intended to travel with the patient across the care-continuum.      Power of  (POA): NOT related to medical decision making. The term POA should not be used in documentation or discussions. A POA is a legal document granting authority for financial and property matters.

## 2021-05-11 NOTE — PROGRESS NOTES
Lexiscan Stress: Pt tolerated well. VSS. Pt denied chest pain/SOB pre and post lexiscan test.    Pt transferred back to Rm 12 per cart.

## 2021-05-11 NOTE — PLAN OF CARE
Observation goals PRIOR TO DISCHARGE     Comments: List all goals to be met before discharge home:     - Serial troponins and stress test complete: Not met  - Seen and cleared by consultant if applicable: Not met   - Adequate pain control on oral analgesia: Met  - Vital signs normal or at patient baseline: Met  - Safe disposition plan has been identified: Not met     - Nurse to notify provider when observation goals have been met and patient is ready for discharge.

## 2021-05-11 NOTE — PROVIDER NOTIFICATION
MD Notification    Notified Person: MD    Notified Person Name: Ware    Notification Date/Time: 5/11/21 at 1636    Notification Interaction: Web page    Purpose of Notification: Diet orders? Or do you want the pt to remain NPO. Please advise. Thanks!    Orders Received: Cardiac diet ordered.    Comments:

## 2021-05-11 NOTE — PLAN OF CARE
Observation goals PRIOR TO DISCHARGE     Comments: List all goals to be met before discharge home:     - Serial troponins and stress test complete: Met  - Seen and cleared by consultant if applicable: Not met  - Adequate pain control on oral analgesia: Met  - Vital signs normal or at patient baseline: Not Met  - Safe disposition plan has been identified: Not met     - Nurse to notify provider when observation goals have been met and patient is ready for discharge.

## 2021-05-12 VITALS
TEMPERATURE: 97.6 F | OXYGEN SATURATION: 93 % | RESPIRATION RATE: 16 BRPM | DIASTOLIC BLOOD PRESSURE: 49 MMHG | HEART RATE: 57 BPM | HEIGHT: 60 IN | BODY MASS INDEX: 24.54 KG/M2 | SYSTOLIC BLOOD PRESSURE: 156 MMHG | WEIGHT: 125 LBS

## 2021-05-12 LAB
ANION GAP SERPL CALCULATED.3IONS-SCNC: 3 MMOL/L (ref 3–14)
BUN SERPL-MCNC: 16 MG/DL (ref 7–30)
CALCIUM SERPL-MCNC: 8.6 MG/DL (ref 8.5–10.1)
CHLORIDE SERPL-SCNC: 103 MMOL/L (ref 94–109)
CO2 SERPL-SCNC: 28 MMOL/L (ref 20–32)
CREAT SERPL-MCNC: 0.7 MG/DL (ref 0.52–1.04)
ERYTHROCYTE [DISTWIDTH] IN BLOOD BY AUTOMATED COUNT: 12.2 % (ref 10–15)
GFR SERPL CREATININE-BSD FRML MDRD: 76 ML/MIN/{1.73_M2}
GLUCOSE SERPL-MCNC: 91 MG/DL (ref 70–99)
HCT VFR BLD AUTO: 41.7 % (ref 35–47)
HGB BLD-MCNC: 13.8 G/DL (ref 11.7–15.7)
MCH RBC QN AUTO: 30.1 PG (ref 26.5–33)
MCHC RBC AUTO-ENTMCNC: 33.1 G/DL (ref 31.5–36.5)
MCV RBC AUTO: 91 FL (ref 78–100)
PLATELET # BLD AUTO: 358 10E9/L (ref 150–450)
POTASSIUM SERPL-SCNC: 4 MMOL/L (ref 3.4–5.3)
RBC # BLD AUTO: 4.59 10E12/L (ref 3.8–5.2)
SODIUM SERPL-SCNC: 134 MMOL/L (ref 133–144)
WBC # BLD AUTO: 11.4 10E9/L (ref 4–11)

## 2021-05-12 PROCEDURE — 250N000013 HC RX MED GY IP 250 OP 250 PS 637: Performed by: INTERNAL MEDICINE

## 2021-05-12 PROCEDURE — 80048 BASIC METABOLIC PNL TOTAL CA: CPT | Performed by: PHYSICIAN ASSISTANT

## 2021-05-12 PROCEDURE — 85027 COMPLETE CBC AUTOMATED: CPT | Performed by: PHYSICIAN ASSISTANT

## 2021-05-12 PROCEDURE — G0378 HOSPITAL OBSERVATION PER HR: HCPCS

## 2021-05-12 PROCEDURE — 250N000013 HC RX MED GY IP 250 OP 250 PS 637: Performed by: PHYSICIAN ASSISTANT

## 2021-05-12 PROCEDURE — 36415 COLL VENOUS BLD VENIPUNCTURE: CPT | Performed by: PHYSICIAN ASSISTANT

## 2021-05-12 PROCEDURE — 99217 PR OBSERVATION CARE DISCHARGE: CPT | Performed by: PHYSICIAN ASSISTANT

## 2021-05-12 RX ORDER — OMEPRAZOLE 40 MG/1
40 CAPSULE, DELAYED RELEASE ORAL
Qty: 30 CAPSULE | Refills: 1 | Status: SHIPPED | OUTPATIENT
Start: 2021-05-13

## 2021-05-12 RX ORDER — CEFUROXIME AXETIL 500 MG/1
500 TABLET ORAL EVERY 12 HOURS
Qty: 9 TABLET | Refills: 0 | Status: SHIPPED | OUTPATIENT
Start: 2021-05-12 | End: 2021-07-07

## 2021-05-12 RX ADMIN — CEFUROXIME AXETIL 500 MG: 500 TABLET ORAL at 08:17

## 2021-05-12 RX ADMIN — ATENOLOL 50 MG: 50 TABLET ORAL at 08:17

## 2021-05-12 RX ADMIN — OMEPRAZOLE 40 MG: 20 CAPSULE, DELAYED RELEASE ORAL at 06:35

## 2021-05-12 RX ADMIN — LORATADINE 10 MG: 10 TABLET ORAL at 08:17

## 2021-05-12 RX ADMIN — CLOPIDOGREL BISULFATE 75 MG: 75 TABLET ORAL at 08:17

## 2021-05-12 NOTE — DISCHARGE SUMMARY
"[facility]    Discharge Summary  Hospitalist    Date of Admission:  5/10/2021  Date of Discharge:  5/12/2021  Discharging Provider: Carl Reynolds  Date of Service (when I saw the patient): 05/12/21    Discharge Diagnoses   1. Chest pain  2. Hypertension with hypertensive urgency  3. Acid reflux  4. Mild gross hematuria with probably urinary tract infection  5. 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 Pt was admitted to [facility] on 5/10/2021 after presenting with complaints of chest pain.     Please refer to the history and physical from Dr. Matthew Crowder for additional information.    Hospital Course   Chest pain  Acid reflux  History is very hard to get with expressive aphasia and her roommate seeming to have some mild dementia.  Naproxen is on her home med list although they are not sure if she is taking it which could be a risk factor for possible ulcer.  She does have some intermittent reflux which may be a cause.  No tachycardia or shortness of breath to suggest pulmonary embolus.  Her last echocardiogram was in 2016 and was normal. Serial troponins negative  -Lexiscan 5/11 results clarified and \"probably negative for inducible myocardial ischemia or infarction\"  ---Initial typo with Aarti results as \"probably abnormal.\" Cardiology consulted and have corrected Aarti scan as above  ---Cardiology recommending Echo, and have signed off.   -----Echo:The visual ejection fraction is estimated at 55-60%.  No regional wall motion abnormalities noted.  The left ventricle is normal in " structure, function and size.  The right ventricle is normal in structure, function and size.  The left atrium is moderately dilated.  There is mild to moderate (1-2+) mitral regurgitation.  There is mild (1+) tricuspid regurgitation.  PASP is 40 mmHg + CVP  There is no pericardial effusion.  -Resume PTA Atenolol. BPs still elevated so dose increased to 50mg BID.  -Continue home Plavix, discontinued her aspirin which her PCP says she should not be on but does seem to be on her home med list.  We made sure this is discontinued at discharge.  -Start omeprazole 40 mg daily and discontinue naproxen in case this could be an ulcer or reflux, Maalox ordered as needed as well, plan to discontinue naproxen at discharge  -Continue Tylenol as needed for pain     Hypertensive urgency.  Initial blood pressure in the field was 220 systolic.  Blood pressures in the ED were 120-170s.  Elevated blood pressures could be reactive from her episode of pain or could be causing her chest pain.  -Held home atenolol for planned stress test, restarted home atenolol after stress test. Still with Hypertension, so increased to Atenolol 50mg BID  --Follow-up with outpatient PCP.  If blood pressures continue to be high consider again increasing Atenolol or starting another agent. Note Lisinopril allergy of swelling and rash listed in chart, so will avoid.     Mild gross hematuria with probably urinary tract infection.  Urinalysis shows greater than 182 RBCs and 15 WBCs with trace leukocyte esterase and moderate bacteria.  Urine grossly shows mild pink tinge to the urine.  -Treat for urinary tract infection with 1 dose of ceftriaxone and then cefuroxime twice daily for 6 additional days   -UC with ,000 E coli, ampicillin resistant, otherwise sensitive  -Follow-up with primary care physician to make sure the urine clears with treatment for UTI     History of stroke with right hemiplegia and expressive aphasia.  --Continue home Plavix  --Gets  "all her cares from her roommate who seems to be having some mild dementia and we will likely need some social work intervention soon.   --SW consulted for home safety eval and discharge planning.  --OT consulted for cognitive screening, unable to complete given above communication difficulties.   --Discussed with patient's roommate who is her primary caregiver.  Both patient and roommate wish for her to return home.  This was confirmed with the patient that she feels safe and well cared for.  --Orders placed for home PT/OT/RN, as well as home SW.  --Appreciate RNCC clarification of POA status and family contacts. Please see Lilian Phillips's detailed notes.     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  CT abdomen showed short-segment dissection flap at the left external iliac artery that is only 0.9 cm in size as well as a penetrating ulcer at the most distal aspect of the descending thoracic aorta measuring 1.5 cm. Both these were confirmed to be chronic by radiology.  -No additional investigation or treatment needed unless symptoms occur      Carl Reynolds PA-C    Significant Results and Procedures   -Lexiscan 5/11 results clarified and \"probably negative for inducible myocardial ischemia or infarction\"  ---Initial typo with Aarti results as \"probably abnormal.\" Cardiology consulted and have corrected Aarti scan as above  ---Cardiology recommending Echo, and have signed off. Re-consult if echo abnormal. Appreciate cardiology recs  -----Echo: The visual ejection fraction is estimated at 55-60%.  No regional wall motion abnormalities noted.  The left ventricle is normal in structure, function and size.  The right ventricle is normal in structure, function and size.  The left atrium is moderately dilated.  There is mild to moderate (1-2+) mitral regurgitation.  There is mild (1+) tricuspid regurgitation.  PASP is 40 mmHg + CVP  There is no " pericardial effusion.    Pending Results   None    Code Status   DNR / DNI       Primary Care Physician   Leo Hancock    Physical Exam   Temp: 97.6  F (36.4  C) Temp src: Oral BP: (!) 156/49 Pulse: 57   Resp: 16 SpO2: 93 % O2 Device: None (Room air)    Vitals:    05/10/21 0756   Weight: 56.7 kg (125 lb)     Vital Signs with Ranges  Temp:  [96  F (35.6  C)-97.6  F (36.4  C)] 97.6  F (36.4  C)  Pulse:  [56-62] 57  Resp:  [16-18] 16  BP: (137-168)/(46-61) 156/49  SpO2:  [91 %-94 %] 93 %  I/O last 3 completed shifts:  In: 840 [P.O.:840]  Out: 850 [Urine:850]    Constitutional: alert, lying in bed, no acute distress  Eyes: No scleral icterus or injection  ENT: Normocephalic, atraumatic, moist mucus membranes  Respiratory: No secondary muscle use or labored breathing. Lungs clear to auscultation bilaterally without wheezes or rhonchi   Cardiovascular: RRR with soft systolic murmur  GI: Abdomen soft, non-tender to palpation, non-distended.  Bowel sounds active  MSK: able to move extremities on command without weakness, walks without weakness or ataxia  Skin/Integumen: warm, dry, intact without rash   Musculoskeletal: No joint swelling, erythema or tenderness.  Neurologic: right hemiplegia with 0/5 strength but is able to wiggle toes on the right, cranial nerves 2-12 intact with expressive aphasia, normal strength on the left but seems overall week from deconditioning.  Psychiatric: Alert, orientation not able to be tested because of expressive aphasia, no obvious anxiety or depression    Discharge Disposition   Discharged to home with the assistance of her roommate.  Condition at discharge: Stable    Consultations This Hospital Stay   SOCIAL WORK IP CONSULT  CARE MANAGEMENT / SOCIAL WORK IP CONSULT  OCCUPATIONAL THERAPY ADULT IP CONSULT  CARDIOLOGY IP CONSULT      Discharge Orders      Care Coordination Referral      Home care nursing referral      Home Care PT Referral for Hospital Discharge      Home Care OT  Referral for Hospital Discharge      Home Care Social Service Referral for Hospital Discharge      Reason for your hospital stay    Chest pain, cardiac evaluation including nuclear stress test does not reveal cause for chest pain.  Could possibly be due to to a GI related source in the stomach or esophagus.  If symptoms recur, contact your PCP for further recommendations.     Follow-up and recommended labs and tests     Follow up with primary care provider, Leo Hancock, within 7 days for hospital follow- up.  Consider further work-up for noncardiac causes of chest pain if needed.  Consider adjusting blood pressure medication regimen.  Kamini would like to make this appointment.  [phone]     MD face to face encounter    Documentation of Face to Face and Certification for Home Health Services    I certify that patient: Steffany Brown is under my care and that I, or a nurse practitioner or physician's assistant working with me, had a face-to-face encounter that meets the physician face-to-face encounter requirements with this patient on: 5/12/2021.    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    I certify that, based on my findings, the following services are medically necessary home health services: Nursing, Occupational Therapy, Physical Therapy and Social Work.    My clinical findings support the need for the above services because: Nurse is needed: To check for patient's home safety, vital signs, medication management, hydration status, skin integrity etc. Occupational Therapy Services are needed to assess and treat cognitive ability and address ADL safety due to history of stroke with right hemiplegia and expressive aphasia. Physical Therapy Services are needed to assess and treat the following functional  impairments: History of stroke with right hemiplegia and expressive aphasia.   ordered to for caregiver support, home safety eval, and assist with community/financial resources.    Further, I certify that my clinical findings support that this patient is homebound (i.e. absences from home require considerable and taxing effort and are for medical reasons or Christian services or infrequently or of short duration when for other reasons) because: Requires assistance of another person or specialized equipment to access medical services because patient: Requires supervision of another for safe transfer.    Based on the above findings. I certify that this patient is confined to the home and needs intermittent skilled nursing care, physical therapy and/or speech therapy.  The patient is under my care, and I have initiated the establishment of the plan of care.  This patient will be followed by a physician who will periodically review the plan of care.  Physician/Provider to provide follow up care: Leo Hancock    Attending hospital physician (the Medicare certified [place] provider): Dr. Brian Roberts MD  Physician Signature: See electronic signature associated with these discharge orders.  Date: 5/12/2021     Activity    Your activity upon discharge: activity as tolerated     Diet    Follow this diet upon discharge: Regular as previously tolerated     Discharge Medications   Discharge Medication List as of 5/12/2021 12:52 PM      START taking these medications    Details   cefuroxime (CEFTIN) 500 MG tablet Take 1 tablet (500 mg) by mouth every 12 hours Until tablets gone., Disp-9 tablet, R-0, E-Prescribe      omeprazole (PRILOSEC) 40 MG DR capsule Take 1 capsule (40 mg) by mouth every morning (before breakfast), Disp-30 capsule, R-1, E-Prescribe         CONTINUE these medications which have NOT CHANGED    Details   acetaminophen (TYLENOL) 500 MG tablet Take 500-1,000 mg by mouth every 6 hours as  needed , Historical      atenolol (TENORMIN) 50 MG tablet Take 1 tablet (50 mg) by mouth daily, Disp-90 tablet, R-3, E-PrescribePROFILE FOR FUTURE REFILLS UNTIL PATIENT CALLS FOR REFILLS      clopidogrel (PLAVIX) 75 MG tablet Take 1 tablet (75 mg) by mouth daily, Disp-90 tablet, R-3, E-PrescribePROFILE FOR FUTURE REFILLS UNTIL PATIENT CALLS FOR REFILLS      loratadine (CLARITIN) 10 MG tablet Take 10 mg by mouth daily, Historical      lovastatin (MEVACOR) 40 MG tablet Take 1 tablet (40 mg) by mouth At Bedtime, Disp-90 tablet, R-1, E-Prescribe         STOP taking these medications       naproxen sodium (ANAPROX) 220 MG tablet Comments:   Reason for Stopping:             Allergies   Allergies   Allergen Reactions     Atorvastatin Calcium Cramps     Leg cramps     Lisinopril Swelling and Rash     Seasonal Allergies      Data   Most Recent 3 CBC's:  Recent Labs   Lab Test 05/12/21  0625 05/10/21  0828 03/03/21  1143   WBC 11.4* 11.9* 14.0*   HGB 13.8 14.3 14.9   MCV 91 91 91    380 359      Most Recent 3 BMP's:  Recent Labs   Lab Test 05/12/21  0625 05/10/21  0828 03/03/21  1143    134 133   POTASSIUM 4.0 4.5 4.2   CHLORIDE 103 101 98   CO2 28 29 27   BUN 16 12 8   CR 0.70 0.51* 0.60   ANIONGAP 3 4 8   NICA 8.6 9.2 10.0   GLC 91 93 101*     Most Recent 2 LFT's:  Recent Labs   Lab Test 05/10/21  0828 03/03/21  1143   AST 26 17   ALT 18 15   ALKPHOS 92 107   BILITOTAL 0.5 0.5     Most Recent INR's and Anticoagulation Dosing History:  Anticoagulation Dose History     Recent Dosing and Labs Latest Ref Rng & Units 9/21/2009 9/23/2009 9/27/2009 9/29/2009 2/25/2011 7/10/2014 1/11/2017    INR 0.86 - 1.14 2.29(H) 2.14(H) 1.64(H) 1.82(H) 1.03 1.02 0.98        Most Recent 3 Troponin's:  Recent Labs   Lab Test 05/10/21  1900 05/10/21  1346 05/10/21  0828   TROPI <0.015 <0.015 <0.015     Most Recent Cholesterol Panel:  Recent Labs   Lab Test 03/03/21  1143   CHOL 209*   *   HDL 55   TRIG 128     Most Recent 6  Bacteria Isolates From Any Culture (See EPIC Reports for Culture Details):  Recent Labs   Lab Test 05/10/21  0829 07/20/16  0758 06/30/14 2000   CULT 50,000 to 100,000 colonies/mL  Escherichia coli  * >100,000 colonies/mL Enterobacter cloacae complex* >100,000 colonies/mL Escherichia coli*     Most Recent TSH, T4 and A1c Labs:  Recent Labs   Lab Test 09/06/16  0838   TSH 1.14     Results for orders placed or performed during the hospital encounter of 05/10/21   CT Aortic Survey w Contrast    Narrative    CT AORTIC SURVEY WITH CONTRAST  5/10/2021 9:48 AM    HISTORY:  Abdominal pain, aortic dissection suspected.    TECHNIQUE: CT scan obtained of the chest, abdomen, and pelvis with IV  contrast. Post contrast scanning performed during the arterial phase.  CT chest also obtained without IV contrast.  80mL [id number]-[id number] IV  injected. Sagittal and coronal reformatted images acquired from the  source post contrast data. Radiation dose for this scan was reduced  using automated exposure control, adjustment of the mA and/or kV  according to patient size, or iterative reconstruction technique.    COMPARISON:  Chest x-ray 1/11/2017.    FINDINGS:  Thoracic and abdominal aorta: No evidence for dissection involving the  thoracic or abdominal aorta. Scattered mild calcifications. However,  there is a 0.9 cm tiny flap within the lumen of the left external  iliac artery, series 5 image 130, that may be a very small dissection.  There is patency of the main takeoff branch vessels from the thoracic  and abdominal aorta. There is a focal penetrating ulcer at the most  distal aspect of the descending thoracic aorta that is 1.5 cm, series  5 image 69. The aorta at this location is 2.2 cm. There is no evidence  for thoracic or abdominal aortic aneurysm. Scattered moderate coronary  artery calcifications.    Other vascular: No pulmonary embolism is identified.    Chest: Mild patchy groundglass opacities at the lower lungs  bilaterally. No  effusions. No worrisome focal airspace disease  identified. Some mild atelectasis along the right middle lobe base  medially. No enlarged lymph nodes are seen. There is fluid surrounding  the left shoulder joint that is 6.7 cm, series 5 image 7.    Abdomen/pelvis: No acute abnormality involving the liver, spleen,  adrenals, pancreas, or kidneys. No bowel obstruction. Postoperative  change of the bowel. Small fluid distends a few distal small bowel  loops. No free fluid or free air.    Bilateral proximal femoral surgical change. Subluxation is likely  chronic at the left hip joint associated with degenerative change.  Spine diffuse degenerative changes.      Impression    IMPRESSION:   1. No acute thoracic or abdominal aortic dissection or aneurysm. No  periaortic hematoma is seen.  2. 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  3. 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  4. There may be mild bibasilar pulmonary edema.  5. Moderate coronary artery calcifications.  6. Lobulated fluid adjacent to the left shoulder joints may be  reactive to degenerative arthropathy. There is also likely chronic  left hip superior subluxation and degenerative change.    SARKIS TORRES MD   Echocardiogram Complete    Narrative    [id number]  [id number]  [id number]  [id number]^SHAMIR^JANEY^VANDANA     [facility]  Echocardiography Laboratory  [address] [place] [address]     Name: MARY MAO  MRN: [id number]  : 1930  Study Date: 2021 03:27 PM  Age: 89 yrs  Gender: Female  Patient Location: [facility]  Reason For Study: Chest Pain  Ordering Physician: JANEY BARKSDALE  Referring Physician: Leo Hancock  Performed By: Jonathan Mary RDCS     BSA: 1.5 m2  Height: 60 in  Weight: 125 lb  HR: 60  BP: 159/59  mmHg  ______________________________________________________________________________  Procedure  Complete Portable Echo Adult. Optison (NDC #4138-1244) given intravenously.  ______________________________________________________________________________  Interpretation Summary     The visual ejection fraction is estimated at 55-60%.  No regional wall motion abnormalities noted.  The left ventricle is normal in structure, function and size.  The right ventricle is normal in structure, function and size.  The left atrium is moderately dilated.  There is mild to moderate (1-2+) mitral regurgitation.  There is mild (1+) tricuspid regurgitation.  PASP is 40 mmHg + CVP  There is no pericardial effusion.  ______________________________________________________________________________  Left Ventricle  The left ventricle is normal in structure, function and size. There is normal  left ventricular wall thickness. The visual ejection fraction is estimated at  55-60%. Left ventricular diastolic function is indeterminate. No regional wall  motion abnormalities noted.     Right Ventricle  The right ventricle is normal in structure, function and size.     Atria  The left atrium is moderately dilated. Right atrial size is normal.     Mitral Valve  There is mild to moderate (1-2+) mitral regurgitation.     Tricuspid Valve  There is mild (1+) tricuspid regurgitation.     Aortic Valve  No aortic regurgitation is present.     Pulmonic Valve  The pulmonic valve is not well visualized.     Vessels  The aortic root is normal size. Normal size ascending aorta. The inferior vena  cava was normal in size with preserved respiratory variability.     Pericardium  There is no pericardial effusion.     Rhythm  Sinus rhythm was noted.  ______________________________________________________________________________  MMode/2D Measurements & Calculations  IVSd: 0.91 cm     LVIDd: 3.8 cm  LVIDs: 2.3 cm  LVPWd: 1.1 cm  FS: 38.0 %  LV mass(C)d: 115.1  grams  LV mass(C)dI: 75.3 grams/m2  Ao root diam: 2.7 cm  LA dimension: 3.6 cm  asc Aorta Diam: 2.8 cm  LA/Ao: 1.3  LA Volume (BP): 56.8 ml  LA Volume Index (BP): 37.1 ml/m2  RWT: 0.58     Doppler Measurements & Calculations  MV E max mackenzie: 60.2 cm/sec  MV A max mackenzie: 64.6 cm/sec  MV E/A: 0.93  MV dec slope: 300.1 cm/sec2  MV dec time: 0.20 sec  PA acc time: 0.12 sec  TR max mackenzie: 317.1 cm/sec  TR max P.2 mmHg  E/E' av.7  Lateral E/e': 9.7  Medial E/e': 13.7     ______________________________________________________________________________  Report approved by: JESÚS Diaz 2021 04:46 PM         NM Lexiscan stress test (nuc card)     Value    Target     Baseline Systolic     Baseline Diastolic BP 77    Last Stress Systolic     Last Stress Diastolic BP 81    Baseline HR 67    Max HR 92    Calculated Percent HR 71    Rate Pressure Product 16,376.0    Left Ventricular EF 93    Addendum: 2021         The nuclear stress test is probably negative for inducible myocardial ischemia or infarction.     Left ventricular function is hyperdynamic.     The left ventricular ejection fraction at stress is 93%.     Left ventricular hypertrophy is noted.     There is no prior study for comparison.     Nuclear Study Quality: The sensitivity of this test is reduced due to poor spatial resolution.        Narrative       The nuclear stress test is probably abnormal.     Left ventricular function is hyperdynamic.     The left ventricular ejection fraction at stress is 93%.     Left ventricular hypertrophy is noted.     There is no prior study for comparison.     Nuclear Study Quality: The sensitivity of this test is reduced due to   poor spatial resolution.

## 2021-05-12 NOTE — PLAN OF CARE
Discharge instructions discussed with patient and pt roommate (Kamini) with questions answered. Discharge medications sent home with patient.

## 2021-05-12 NOTE — PROGRESS NOTES
"Pt refused repositioning and vitals. When RN entered room, patient became agitated and shook their head rapidly when RN approached. Pt then stated \"Get out.\"  "

## 2021-05-12 NOTE — PROGRESS NOTES
JANET arranged [facility] transport. Informed Kamini/ Mac CARSON that patient will get billed for transport at the cost of $78 base rate and $5.06 per mile. Kamini acknowledged and agreed to cost of transport.    GERSON Webb  Daytime (8:00am-4:30pm): [phone]  After-Hours JANET Pager (4:30pm-11:30pm): [phone]

## 2021-05-12 NOTE — PROVIDER NOTIFICATION
MD Notification    Notified Person: MD    Notified Person Name: Dr. Mcdonald    Notification Date/Time: 2017, 5/11/21    Notification Interaction: Page    Purpose of Notification: obs 12 FYI Atenolol was held because HR was 56, BP is 137/46. Will continue to monitor.     Orders Received:    Comments:

## 2021-05-12 NOTE — PLAN OF CARE
OT: Per chart review and discussion with RN, pt not appropriate for therapy at this time. Pt is observation status, with severe expressive aphasia, and has good family support at home. Likely discharge home this date per RN, will continue to follow to see if OT eval is appropriate next date.

## 2021-05-12 NOTE — PLAN OF CARE
Observation Goals:      - Serial troponins and stress test complete: met  - Seen and cleared by consultant if applicable: met  - Adequate pain control on oral analgesia: met  - Vital signs normal or at patient baseline: met  - Safe disposition plan has been identified: not met  - Nurse to notify provider when observation goals have been met and patient is ready for discharge.

## 2021-05-12 NOTE — PLAN OF CARE
Observation Goals:      - Serial troponins and stress test complete: met  - Seen and cleared by consultant if applicable: met  - Adequate pain control on oral analgesia: met  - Vital signs normal or at patient baseline: partially met  - Safe disposition plan has been identified: not met  - Nurse to notify provider when observation goals have been met and patient is ready for discharge.

## 2021-05-12 NOTE — PLAN OF CARE
Observation Goals:      - Serial troponins and stress test complete: met  - Seen and cleared by consultant if applicable: met  - Adequate pain control on oral analgesia: met  - Vital signs normal or at patient baseline: met  - Safe disposition plan has been identified: not met  - Nurse to notify provider when observation goals have been met and patient is ready for discharge.        A/Ox3, disorientated to place. VSS. Turn and repo. Purwick in place w/ good output. Pt denies any chest pain, HA, tingling in fingers and toes, and SOB. Baseline right sided weakness. Pt did become agitated throughout night and refused repos and vitals. Is calm and cooperative this am. Tele was NSR. JANET, care coordinator, Ot, and Cardiology following.

## 2021-05-12 NOTE — PROGRESS NOTES
Care Management Discharge Note    Discharge Date: 05/12/21       Discharge Disposition: Home Care    Discharge Services: Transportation Services    Discharge DME: Wheelchair    Discharge Transportation: health plan transportation    Private pay costs discussed: transportation costs See SW note    PAS Confirmation Code:  NA  Patient/family educated on Medicare website which has current facility and service quality ratings: yes(niece educated)    Education Provided on the Discharge Plan:    Persons Notified of Discharge Plans: Niece, Bedside RN, Pt, and roommate  Patient/Family in Agreement with the Plan: yes    Handoff Referral Completed: Yes       Additional Information:  I spoke with Kamini Jeter's roommate and she does not want me to make the PCP follow up appointment.  I encouraged her to let me do it and she does not want me to.  I did send a handoff to the PT's PCP due to this and need for home care and GI f/u.    [place] Short term memory is very concerning to the Hospital Team.  Steffany came into the hospital very well cared for physically by Kamini.  Per conversation with ROSALIA Quintana she reported that Kamini's family lives in the area and check on them often, take them to medical appointments, and bring them meals that they only have to microwave.    Home care SW as been ordered in addition to RN/PT/OT and I sent an email to [facility] to alert them to the face the pt is discharging today.  The hope is that the SW can work with Steffany and Kamini to transition to an AL where they would be in a safter more contained environment to support Kamini and her poor Short term memory and caring for Steffany if needed.      I did a LVM with the pt's niece to update her on the POC of Home care with a RN/PT/OT/SW.      Kamini's family is aware that Steffany is discharging today and they did bring her clothes back here to the hospital.  They were waiting outside of the hospital for a long time today and could not wait any longer.   Therefore a medical transportation ride was arranged.      1520 Addendum:  The pt's Niece Joy called me back and stated she has been in close contact with Kamini's sister her in the [facility] and will continue to do so.  She is very thankful for the support of all of the staff here and is glad that we added on SW to the Home Care orders.  Joy is Steffany's next of kin and therefore the pt's legal decision maker.  She hopes that Steffany and Kamini on their own with support from home care will decide to transfer to AL.  Joy thinks AL would be a very safe living situation for them.        Miri Walsh RN, BSN Care Coordinator  [facility]  Mobile: [phone]

## 2021-05-13 ENCOUNTER — TELEPHONE (OUTPATIENT)
Dept: INTERNAL MEDICINE | Facility: CLINIC | Age: 86
End: 2021-05-13

## 2021-05-13 LAB — INTERPRETATION ECG - MUSE: NORMAL

## 2021-05-14 ENCOUNTER — PATIENT OUTREACH (OUTPATIENT)
Dept: NURSING | Facility: CLINIC | Age: 86
End: 2021-05-14
Payer: MEDICARE

## 2021-05-14 ENCOUNTER — DOCUMENTATION ONLY (OUTPATIENT)
Dept: CARE COORDINATION | Facility: CLINIC | Age: 86
End: 2021-05-14

## 2021-05-14 DIAGNOSIS — I69.920 APHASIA, LATE EFFECT OF CEREBROVASCULAR DISEASE: ICD-10-CM

## 2021-05-14 DIAGNOSIS — E22.2 SIADH (SYNDROME OF INAPPROPRIATE ADH PRODUCTION) (H): ICD-10-CM

## 2021-05-14 DIAGNOSIS — I69.959 HEMIPLEGIA OF DOMINANT SIDE AS LATE EFFECT FOLLOWING CEREBROVASCULAR DISEASE (H): Primary | ICD-10-CM

## 2021-05-14 DIAGNOSIS — R29.6 FALLS FREQUENTLY: ICD-10-CM

## 2021-05-14 DIAGNOSIS — M06.9 RHEUMATOID ARTHRITIS INVOLVING MULTIPLE SITES, UNSPECIFIED WHETHER RHEUMATOID FACTOR PRESENT (H): ICD-10-CM

## 2021-05-14 DIAGNOSIS — H35.3130 BILATERAL NONEXUDATIVE AGE-RELATED MACULAR DEGENERATION, UNSPECIFIED STAGE: ICD-10-CM

## 2021-05-14 SDOH — ECONOMIC STABILITY: FOOD INSECURITY: WITHIN THE PAST 12 MONTHS, THE FOOD YOU BOUGHT JUST DIDN'T LAST AND YOU DIDN'T HAVE MONEY TO GET MORE.: NEVER TRUE

## 2021-05-14 SDOH — ECONOMIC STABILITY: TRANSPORTATION INSECURITY
IN THE PAST 12 MONTHS, HAS LACK OF TRANSPORTATION KEPT YOU FROM MEETINGS, WORK, OR FROM GETTING THINGS NEEDED FOR DAILY LIVING?: NO

## 2021-05-14 SDOH — ECONOMIC STABILITY: INCOME INSECURITY: HOW HARD IS IT FOR YOU TO PAY FOR THE VERY BASICS LIKE FOOD, HOUSING, MEDICAL CARE, AND HEATING?: SOMEWHAT HARD

## 2021-05-14 SDOH — ECONOMIC STABILITY: FOOD INSECURITY: WITHIN THE PAST 12 MONTHS, YOU WORRIED THAT YOUR FOOD WOULD RUN OUT BEFORE YOU GOT MONEY TO BUY MORE.: NEVER TRUE

## 2021-05-14 SDOH — HEALTH STABILITY: PHYSICAL HEALTH: ON AVERAGE, HOW MANY DAYS PER WEEK DO YOU ENGAGE IN MODERATE TO STRENUOUS EXERCISE (LIKE A BRISK WALK)?: NOT ASKED

## 2021-05-14 SDOH — HEALTH STABILITY: PHYSICAL HEALTH: ON AVERAGE, HOW MANY MINUTES DO YOU ENGAGE IN EXERCISE AT THIS LEVEL?: NOT ASKED

## 2021-05-14 SDOH — HEALTH STABILITY: MENTAL HEALTH
STRESS IS WHEN SOMEONE FEELS TENSE, NERVOUS, ANXIOUS, OR CAN'T SLEEP AT NIGHT BECAUSE THEIR MIND IS TROUBLED. HOW STRESSED ARE YOU?: ONLY A LITTLE

## 2021-05-14 SDOH — ECONOMIC STABILITY: TRANSPORTATION INSECURITY
IN THE PAST 12 MONTHS, HAS THE LACK OF TRANSPORTATION KEPT YOU FROM MEDICAL APPOINTMENTS OR FROM GETTING MEDICATIONS?: NO

## 2021-05-14 ASSESSMENT — ACTIVITIES OF DAILY LIVING (ADL)
DEPENDENT_IADLS:: CLEANING;COOKING;LAUNDRY;SHOPPING;MEAL PREPARATION;MEDICATION MANAGEMENT;MONEY MANAGEMENT;TRANSPORTATION

## 2021-05-14 NOTE — PROGRESS NOTES
RENETTA...Completed homecare assessment and patient was not admitted due to not being able to put a safe plan of care in place.  Patient roommate Kamini is sole caretaker and POA.  Patient needs a 2 person assist for safe transfers and there is not a second person available to assist Kamini. There are also concerns regarding Kamini's cognition, noted to be forgetful and repeating things throughout visit.  It was recommended additional help come into home or senior care setting and was declined.  Dr. Hancock/GERSON Rico from primary care clinic also notified.

## 2021-05-14 NOTE — PROGRESS NOTES
Clinic Care Coordination Contact    Clinic Care Coordination Contact  OUTREACH    Referral Information:  Referral Source: PCP    Addendum 5/14/21: ROSALIA GONZALES spoke with [facility] nurse, Daniel, who informs they were not able to take patient due to not having a safe plan in place     Addendum 5/17/21: JANET recvd call from patient's niece, Joy, who states Kamini was very upset after receiving call from JANET as she thought Joy was maybe behind this.   Joy shares that she is not close to Steffany, but she is her only living relative and she doesn't feel like she is in a safe situation. Joy is next of kin and is therefor medical decision maker for pt.  JANET informed Joy that [facility] report was filed with the [facility] [id number](reference #)    Addendum 5/19: CC JANET talked in great detail with Gissel from [facility] with situation going on with pt and provided additional details.    Recvd message from Gissel on 5/20 informing she asked for another home care referral to be placed but it was sent to [facility] again. JANET sent message to PCP to see if he could send to an alternative agency.    Background: Patient had recent hospital visit from 5/10 to 5/12 after experiencing chest pain. Inpatient SW noted concerns about patient's caretaker, Kamini, who may have some short term memory issues.      Assessment:  CC JANET called patient's roommate and caretaker, Kamini, who is POA and introduced myself and reason for the call.  Kamini shares that nurse from [facility] was there right now working with Steffany.    Kamini shares that she is patient's main caretaker and is still in great shape. Kamini goes into great detail in the cares that she provides for Kamini ex; cooking, cleaning, still drives and does all shopping, manages pt's finances. Ex..  Kamini shares that she has a very large family close by and that they check in often.      CC JANET asks if they have talked/considered moving into [facility] and Kamini shares that they have,  but they are not there yet. Kamini shares that their are no concerns financially for either of them. They both receive social security as well as pensions.    SW offered to enroll patient so we could follow and see if patient may need services after home care is complete, also offered meal delivery/grocery delivery but Kamini declines and states this is not necessary. Kamini did take down my name and direct # and will reach out if something comes up.       Chief Complaint   Patient presents with     Clinic Care Coordination - Initial     Needs assessment        Universal Utilization:   Clinic Utilization  Difficulty keeping appointments:: No  Compliance Concerns: No  No-Show Concerns: No  No PCP office visit in Past Year: No  Utilization    Last refreshed: 5/13/2021  7:08 PM: Hospital Admissions 1           Last refreshed: 5/13/2021  7:08 PM: ED Visits 1           Last refreshed: 5/13/2021  7:08 PM: No Show Count (past year) 0              Current as of: 5/13/2021  7:08 PM              Clinical Concerns:  Current Medical Concerns: Stroke and right-sided hemiplegia with expressive aphasia, hypertension, occasional reflux    Current Behavioral Concerns: Na  Education Provided to patient: Care Coordination  Pain  Pain (GOAL):: No  Health Maintenance Reviewed: Up to date  Clinical Pathway: None   Not appropriate, assessment not completed with patient    Medication Management:  Kamini assists with set-up and administration of patient's medications    Functional Status:  Dependent ADLs:: Ambulation-walker, Bathing, Dressing, Eating, Grooming, Positioning, Transfers, Wheelchair-with assist  Dependent IADLs:: Cleaning, Cooking, Laundry, Shopping, Meal Preparation, Medication Management, Money Management, Transportation  Bed or wheelchair confined:: No  Mobility Status: Dependent/Assisted by Another  Fallen 2 or more times in the past year?: No  Any fall with injury in the past year?: No    Living Situation:  Current living  arrangement:: I live in a private home(with raina gomez)  Type of residence:: Private home - stairs    Lifestyle & Psychosocial Needs:  Lifestyle     Physical activity     Days per week: Not on file     Minutes per session: Not on file     Stress: Only a little     Social Needs     Financial resource strain: Somewhat hard     Food insecurity     Worry: Never true     Inability: Never true     Transportation needs     Medical: No     Non-medical: No     Inadequate nutrition (GOAL):: No  Tube Feeding: No  Inadequate activity/exercise (GOAL):: No  Significant changes in sleep pattern (GOAL): No  Transportation means:: Accessible car(Raina Gomez helps with everything)     Jehovah's witness or spiritual beliefs that impact treatment:: No  Mental health DX:: No  Mental health management concern (GOAL):: No  Chemical Dependency Status: No Current Concerns  Informal Support system:: Family(Radha that checks in her. Galo family checks in on them as well)   Socioeconomic History     Marital status: Single     Spouse name: Not on file     Number of children: 0     Years of education: Not on file     Highest education level: Not on file   Occupational History     Occupation:      Employer: OTHER     Comment: [employer]     Tobacco Use     Smoking status: Former Smoker     Quit date: 1948     Years since quittin.4     Smokeless tobacco: Never Used   Substance and Sexual Activity     Alcohol use: Yes     Comment: rare     Drug use: No     Sexual activity: Never     Resources and Interventions:  Current Resources:   Skilled Home Care Services: Skilled Nursing, Home Health Aid, Physicial Therapy, Occupational Therapy  Community Resources: None     Equipment Currently Used at Home: walker, rolling, wheelchair, manual, grab bar, toilet, grab bar, tub/shower, shower chair  Employment Status: retired)   )    Advance Care Plan/Directive  Advanced Care Plans/Directives on file:: Yes  Advanced Care  Plan/Directive Status: Declined Further Information            Plan: Patient's caretaker does not wish to enroll at this time. She does have information on how to enroll in CC when ready.

## 2021-05-18 NOTE — PROGRESS NOTES
Dr. Hancock,     [facility], , Gissel, ph# [phone] is calling about a report that she received today on pt. And she is just following up about the home care referral.   She needs new homecare referral ordered by Dr. Hancock.   Referral pending for signature.

## 2021-05-19 ENCOUNTER — TELEPHONE (OUTPATIENT)
Dept: INTERNAL MEDICINE | Facility: CLINIC | Age: 86
End: 2021-05-19

## 2021-05-19 NOTE — TELEPHONE ENCOUNTER
Gissel from [facility] calling wondering if Dr. Hancock referred pt to home care?    Advised Dr. Hancock did refer pt to [facility] home care and Gissel states [facility] refused to see pt.  Needs a referral to a different home care agency.    Please call Gissel at [phone] to let her know if Dr. Hancock is referring to a different home care agency.      Daya VILA RN  EP Triage

## 2021-05-24 DIAGNOSIS — R13.10 DYSPHAGIA, UNSPECIFIED TYPE: ICD-10-CM

## 2021-05-24 DIAGNOSIS — I69.920 APHASIA, LATE EFFECT OF CEREBROVASCULAR DISEASE: ICD-10-CM

## 2021-05-24 DIAGNOSIS — I69.959 HEMIPLEGIA OF DOMINANT SIDE AS LATE EFFECT FOLLOWING CEREBROVASCULAR DISEASE (H): Primary | ICD-10-CM

## 2021-07-01 ENCOUNTER — DOCUMENTATION ONLY (OUTPATIENT)
Dept: OTHER | Facility: CLINIC | Age: 86
End: 2021-07-01

## 2021-07-07 ENCOUNTER — APPOINTMENT (OUTPATIENT)
Dept: GENERAL RADIOLOGY | Facility: CLINIC | Age: 86
DRG: 605 | End: 2021-07-07
Attending: EMERGENCY MEDICINE
Payer: MEDICARE

## 2021-07-07 ENCOUNTER — HOSPITAL ENCOUNTER (INPATIENT)
Facility: CLINIC | Age: 86
LOS: 2 days | Discharge: HOME HEALTH | DRG: 605 | End: 2021-07-10
Attending: EMERGENCY MEDICINE | Admitting: INTERNAL MEDICINE
Payer: MEDICARE

## 2021-07-07 ENCOUNTER — APPOINTMENT (OUTPATIENT)
Dept: CT IMAGING | Facility: CLINIC | Age: 86
DRG: 605 | End: 2021-07-07
Attending: EMERGENCY MEDICINE
Payer: MEDICARE

## 2021-07-07 DIAGNOSIS — N39.0 URINARY TRACT INFECTION WITHOUT HEMATURIA, SITE UNSPECIFIED: Primary | ICD-10-CM

## 2021-07-07 DIAGNOSIS — I10 BENIGN ESSENTIAL HYPERTENSION: ICD-10-CM

## 2021-07-07 DIAGNOSIS — E87.1 HYPONATREMIA: ICD-10-CM

## 2021-07-07 DIAGNOSIS — M79.661 PAIN OF RIGHT LOWER LEG: ICD-10-CM

## 2021-07-07 DIAGNOSIS — M25.551 HIP PAIN, RIGHT: ICD-10-CM

## 2021-07-07 DIAGNOSIS — S80.11XA HEMATOMA OF RIGHT LOWER EXTREMITY, INITIAL ENCOUNTER: ICD-10-CM

## 2021-07-07 LAB
ALBUMIN SERPL-MCNC: 3.5 G/DL (ref 3.4–5)
ALBUMIN UR-MCNC: 30 MG/DL
ALP SERPL-CCNC: 115 U/L (ref 40–150)
ALT SERPL W P-5'-P-CCNC: 18 U/L (ref 0–50)
ANION GAP SERPL CALCULATED.3IONS-SCNC: 8 MMOL/L (ref 3–14)
APPEARANCE UR: ABNORMAL
AST SERPL W P-5'-P-CCNC: 21 U/L (ref 0–45)
BACTERIA #/AREA URNS HPF: ABNORMAL /HPF
BASOPHILS # BLD AUTO: 0.1 10E9/L (ref 0–0.2)
BASOPHILS NFR BLD AUTO: 0.5 %
BILIRUB SERPL-MCNC: 0.6 MG/DL (ref 0.2–1.3)
BILIRUB UR QL STRIP: NEGATIVE
BUN SERPL-MCNC: 7 MG/DL (ref 7–30)
CALCIUM SERPL-MCNC: 9.6 MG/DL (ref 8.5–10.1)
CHLORIDE SERPL-SCNC: 92 MMOL/L (ref 94–109)
CK SERPL-CCNC: 72 U/L (ref 30–225)
CO2 SERPL-SCNC: 25 MMOL/L (ref 20–32)
COLOR UR AUTO: ABNORMAL
CREAT SERPL-MCNC: 0.49 MG/DL (ref 0.52–1.04)
DIFFERENTIAL METHOD BLD: ABNORMAL
EOSINOPHIL # BLD AUTO: 0.2 10E9/L (ref 0–0.7)
EOSINOPHIL NFR BLD AUTO: 1.2 %
ERYTHROCYTE [DISTWIDTH] IN BLOOD BY AUTOMATED COUNT: 12.2 % (ref 10–15)
GFR SERPL CREATININE-BSD FRML MDRD: 85 ML/MIN/{1.73_M2}
GLUCOSE SERPL-MCNC: 126 MG/DL (ref 70–99)
GLUCOSE UR STRIP-MCNC: NEGATIVE MG/DL
HCT VFR BLD AUTO: 44.8 % (ref 35–47)
HGB BLD-MCNC: 15.3 G/DL (ref 11.7–15.7)
HGB UR QL STRIP: NEGATIVE
IMM GRANULOCYTES # BLD: 0.1 10E9/L (ref 0–0.4)
IMM GRANULOCYTES NFR BLD: 0.8 %
KETONES UR STRIP-MCNC: NEGATIVE MG/DL
LABORATORY COMMENT REPORT: NORMAL
LACTATE BLD-SCNC: 1.3 MMOL/L (ref 0.7–2)
LEUKOCYTE ESTERASE UR QL STRIP: ABNORMAL
LYMPHOCYTES # BLD AUTO: 1.1 10E9/L (ref 0.8–5.3)
LYMPHOCYTES NFR BLD AUTO: 8.2 %
MCH RBC QN AUTO: 30 PG (ref 26.5–33)
MCHC RBC AUTO-ENTMCNC: 34.2 G/DL (ref 31.5–36.5)
MCV RBC AUTO: 88 FL (ref 78–100)
MONOCYTES # BLD AUTO: 1.5 10E9/L (ref 0–1.3)
MONOCYTES NFR BLD AUTO: 10.8 %
NEUTROPHILS # BLD AUTO: 10.8 10E9/L (ref 1.6–8.3)
NEUTROPHILS NFR BLD AUTO: 78.5 %
NITRATE UR QL: NEGATIVE
NRBC # BLD AUTO: 0 10*3/UL
NRBC BLD AUTO-RTO: 0 /100
OSMOLALITY UR: 370 MMOL/KG (ref 100–1200)
PH UR STRIP: 6.5 PH (ref 5–7)
PLATELET # BLD AUTO: 397 10E9/L (ref 150–450)
POTASSIUM SERPL-SCNC: 3.7 MMOL/L (ref 3.4–5.3)
PROT SERPL-MCNC: 8 G/DL (ref 6.8–8.8)
RBC # BLD AUTO: 5.1 10E12/L (ref 3.8–5.2)
RBC #/AREA URNS AUTO: 3 /HPF (ref 0–2)
SARS-COV-2 RNA RESP QL NAA+PROBE: NEGATIVE
SODIUM SERPL-SCNC: 125 MMOL/L (ref 133–144)
SODIUM UR-SCNC: 98 MMOL/L
SOURCE: ABNORMAL
SP GR UR STRIP: 1.01 (ref 1–1.03)
SPECIMEN SOURCE: NORMAL
SQUAMOUS #/AREA URNS AUTO: 0 /HPF (ref 0–1)
TSH SERPL DL<=0.005 MIU/L-ACNC: 1.39 MU/L (ref 0.4–4)
UROBILINOGEN UR STRIP-MCNC: 0 MG/DL (ref 0–2)
WBC # BLD AUTO: 13.8 10E9/L (ref 4–11)
WBC #/AREA URNS AUTO: >182 /HPF (ref 0–5)

## 2021-07-07 PROCEDURE — 87186 SC STD MICRODIL/AGAR DIL: CPT | Performed by: INTERNAL MEDICINE

## 2021-07-07 PROCEDURE — 99285 EMERGENCY DEPT VISIT HI MDM: CPT | Mod: 25

## 2021-07-07 PROCEDURE — 96375 TX/PRO/DX INJ NEW DRUG ADDON: CPT

## 2021-07-07 PROCEDURE — 73700 CT LOWER EXTREMITY W/O DYE: CPT | Mod: RT,MG

## 2021-07-07 PROCEDURE — 87086 URINE CULTURE/COLONY COUNT: CPT | Performed by: INTERNAL MEDICINE

## 2021-07-07 PROCEDURE — 99220 PR INITIAL OBSERVATION CARE,LEVEL III: CPT | Performed by: INTERNAL MEDICINE

## 2021-07-07 PROCEDURE — 85025 COMPLETE CBC W/AUTO DIFF WBC: CPT | Performed by: EMERGENCY MEDICINE

## 2021-07-07 PROCEDURE — 73502 X-RAY EXAM HIP UNI 2-3 VIEWS: CPT

## 2021-07-07 PROCEDURE — G0378 HOSPITAL OBSERVATION PER HR: HCPCS

## 2021-07-07 PROCEDURE — 87088 URINE BACTERIA CULTURE: CPT | Performed by: INTERNAL MEDICINE

## 2021-07-07 PROCEDURE — 96376 TX/PRO/DX INJ SAME DRUG ADON: CPT

## 2021-07-07 PROCEDURE — 258N000003 HC RX IP 258 OP 636: Performed by: INTERNAL MEDICINE

## 2021-07-07 PROCEDURE — 250N000013 HC RX MED GY IP 250 OP 250 PS 637: Performed by: INTERNAL MEDICINE

## 2021-07-07 PROCEDURE — 250N000011 HC RX IP 250 OP 636: Performed by: EMERGENCY MEDICINE

## 2021-07-07 PROCEDURE — 83935 ASSAY OF URINE OSMOLALITY: CPT | Performed by: INTERNAL MEDICINE

## 2021-07-07 PROCEDURE — 258N000003 HC RX IP 258 OP 636: Performed by: EMERGENCY MEDICINE

## 2021-07-07 PROCEDURE — 81001 URINALYSIS AUTO W/SCOPE: CPT | Performed by: INTERNAL MEDICINE

## 2021-07-07 PROCEDURE — 84300 ASSAY OF URINE SODIUM: CPT | Performed by: INTERNAL MEDICINE

## 2021-07-07 PROCEDURE — 84443 ASSAY THYROID STIM HORMONE: CPT | Performed by: EMERGENCY MEDICINE

## 2021-07-07 PROCEDURE — 80053 COMPREHEN METABOLIC PANEL: CPT | Performed by: EMERGENCY MEDICINE

## 2021-07-07 PROCEDURE — 250N000011 HC RX IP 250 OP 636: Performed by: INTERNAL MEDICINE

## 2021-07-07 PROCEDURE — 96374 THER/PROPH/DIAG INJ IV PUSH: CPT

## 2021-07-07 PROCEDURE — 87635 SARS-COV-2 COVID-19 AMP PRB: CPT | Performed by: EMERGENCY MEDICINE

## 2021-07-07 PROCEDURE — C9803 HOPD COVID-19 SPEC COLLECT: HCPCS

## 2021-07-07 PROCEDURE — 82550 ASSAY OF CK (CPK): CPT | Performed by: EMERGENCY MEDICINE

## 2021-07-07 PROCEDURE — 96361 HYDRATE IV INFUSION ADD-ON: CPT

## 2021-07-07 PROCEDURE — 83605 ASSAY OF LACTIC ACID: CPT | Performed by: EMERGENCY MEDICINE

## 2021-07-07 PROCEDURE — 73560 X-RAY EXAM OF KNEE 1 OR 2: CPT | Mod: RT

## 2021-07-07 RX ORDER — AMLODIPINE BESYLATE 5 MG/1
5 TABLET ORAL DAILY
Status: DISCONTINUED | OUTPATIENT
Start: 2021-07-07 | End: 2021-07-10 | Stop reason: HOSPADM

## 2021-07-07 RX ORDER — ONDANSETRON 2 MG/ML
4 INJECTION INTRAMUSCULAR; INTRAVENOUS EVERY 6 HOURS PRN
Status: DISCONTINUED | OUTPATIENT
Start: 2021-07-07 | End: 2021-07-10 | Stop reason: HOSPADM

## 2021-07-07 RX ORDER — ATENOLOL 50 MG/1
50 TABLET ORAL DAILY
Status: DISCONTINUED | OUTPATIENT
Start: 2021-07-07 | End: 2021-07-10 | Stop reason: HOSPADM

## 2021-07-07 RX ORDER — HYDRALAZINE HYDROCHLORIDE 20 MG/ML
10 INJECTION INTRAMUSCULAR; INTRAVENOUS ONCE
Status: COMPLETED | OUTPATIENT
Start: 2021-07-07 | End: 2021-07-07

## 2021-07-07 RX ORDER — NALOXONE HYDROCHLORIDE 0.4 MG/ML
0.4 INJECTION, SOLUTION INTRAMUSCULAR; INTRAVENOUS; SUBCUTANEOUS
Status: DISCONTINUED | OUTPATIENT
Start: 2021-07-07 | End: 2021-07-10 | Stop reason: HOSPADM

## 2021-07-07 RX ORDER — SODIUM CHLORIDE 9 MG/ML
INJECTION, SOLUTION INTRAVENOUS CONTINUOUS
Status: DISCONTINUED | OUTPATIENT
Start: 2021-07-07 | End: 2021-07-09

## 2021-07-07 RX ORDER — NALOXONE HYDROCHLORIDE 0.4 MG/ML
0.2 INJECTION, SOLUTION INTRAMUSCULAR; INTRAVENOUS; SUBCUTANEOUS
Status: DISCONTINUED | OUTPATIENT
Start: 2021-07-07 | End: 2021-07-10 | Stop reason: HOSPADM

## 2021-07-07 RX ORDER — ACETAMINOPHEN 500 MG
500-1000 TABLET ORAL EVERY 6 HOURS PRN
Status: DISCONTINUED | OUTPATIENT
Start: 2021-07-07 | End: 2021-07-10 | Stop reason: HOSPADM

## 2021-07-07 RX ORDER — HYDROMORPHONE HYDROCHLORIDE 1 MG/ML
0.3 INJECTION, SOLUTION INTRAMUSCULAR; INTRAVENOUS; SUBCUTANEOUS
Status: DISCONTINUED | OUTPATIENT
Start: 2021-07-07 | End: 2021-07-10 | Stop reason: HOSPADM

## 2021-07-07 RX ORDER — MORPHINE SULFATE 2 MG/ML
1 INJECTION, SOLUTION INTRAMUSCULAR; INTRAVENOUS ONCE
Status: COMPLETED | OUTPATIENT
Start: 2021-07-07 | End: 2021-07-07

## 2021-07-07 RX ORDER — ONDANSETRON 4 MG/1
4 TABLET, ORALLY DISINTEGRATING ORAL EVERY 6 HOURS PRN
Status: DISCONTINUED | OUTPATIENT
Start: 2021-07-07 | End: 2021-07-10 | Stop reason: HOSPADM

## 2021-07-07 RX ORDER — HYDRALAZINE HYDROCHLORIDE 20 MG/ML
10 INJECTION INTRAMUSCULAR; INTRAVENOUS EVERY 4 HOURS PRN
Status: DISCONTINUED | OUTPATIENT
Start: 2021-07-07 | End: 2021-07-10 | Stop reason: HOSPADM

## 2021-07-07 RX ORDER — MORPHINE SULFATE 2 MG/ML
3 INJECTION, SOLUTION INTRAMUSCULAR; INTRAVENOUS ONCE
Status: COMPLETED | OUTPATIENT
Start: 2021-07-07 | End: 2021-07-07

## 2021-07-07 RX ORDER — DIAZEPAM 10 MG/2ML
2.5 INJECTION, SOLUTION INTRAMUSCULAR; INTRAVENOUS ONCE
Status: COMPLETED | OUTPATIENT
Start: 2021-07-07 | End: 2021-07-07

## 2021-07-07 RX ADMIN — MORPHINE SULFATE 3 MG: 2 INJECTION, SOLUTION INTRAMUSCULAR; INTRAVENOUS at 11:59

## 2021-07-07 RX ADMIN — ATENOLOL 50 MG: 50 TABLET ORAL at 17:48

## 2021-07-07 RX ADMIN — SODIUM CHLORIDE: 9 INJECTION, SOLUTION INTRAVENOUS at 14:35

## 2021-07-07 RX ADMIN — SODIUM CHLORIDE 1000 ML: 9 INJECTION, SOLUTION INTRAVENOUS at 11:50

## 2021-07-07 RX ADMIN — HYDROMORPHONE HYDROCHLORIDE 0.3 MG: 1 INJECTION, SOLUTION INTRAMUSCULAR; INTRAVENOUS; SUBCUTANEOUS at 18:42

## 2021-07-07 RX ADMIN — DIAZEPAM 2.5 MG: 5 INJECTION, SOLUTION INTRAMUSCULAR; INTRAVENOUS at 10:16

## 2021-07-07 RX ADMIN — AMLODIPINE BESYLATE 5 MG: 5 TABLET ORAL at 17:48

## 2021-07-07 RX ADMIN — HYDRALAZINE HYDROCHLORIDE 10 MG: 20 INJECTION INTRAMUSCULAR; INTRAVENOUS at 13:42

## 2021-07-07 RX ADMIN — MORPHINE SULFATE 1 MG: 2 INJECTION, SOLUTION INTRAMUSCULAR; INTRAVENOUS at 10:20

## 2021-07-07 ASSESSMENT — ENCOUNTER SYMPTOMS: MYALGIAS: 1

## 2021-07-07 NOTE — H&P
Admitted: 07/07/2021    PRIMARY CARE PROVIDER:  Jon Hancock through [facility].    CHIEF COMPLAINT:  Fall and right hip pain.    HISTORY OF PRESENT ILLNESS:  Steffany Brown is a 89-year-old female with a past medical history significant for stroke, for which she has residual aphasia and right sided hemiparesis, hypertension, dyslipidemia, rheumatoid arthritis and osteoporosis, among other medical problems, who presents to the Emergency Room today for evaluation of right hip pain.  Given the patient's aphasia.  Her history was obtained exclusively per review of the medical record and discussions with her roommate Kamini at bedside.  Also present was Kamini's sister Isreal.    The patient was last hospitalized here in 5/2021 for evaluation of chest pain.  During that hospitalization, she was noted to have associated hypertensive urgency and some mild hematuria.  Her cardiac evaluation including an echo and Lexiscan were negative.  Her atenolol dose was increased and she was recommended to follow up with her PCP for ongoing blood pressure management.  At the time of that discharge home care services were recommended.  Per review of the records, it sounds as though patient was never actually seen by [facility] and there was a recommendation that she should establish with a new home care agency.  It does not appear that she every established with a new home care agency or followed up with his primary care provider.  Kamnii tells me today that at baseline, the patient is essentially wheelchair bound at baseline and gets help with transfers.  (Kamini seems to have some mild situational confusion and repeatedly asks me the same questions within a short time frame).  During her last hospitalization it was deemed that the patient's niece Joy would be her medical decision maker (Kamini is her financial power of ).  Isreal and tells me today that since her last hospitalization,  "they now have notarized paperwork that has confirmed Kamini as the patient's medical power of , Isreal is an associate decision maker.  This was confirmed and I can see those documents in our chart.    It sounds as though Steffany has been in her usual state of health.  Kamini does not report any recent changes in her overall condition.  She has had no recent illness.  She has been eating and drinking okay.  No reports of chest pain, shortness of breath, abdominal pain, nausea and vomiting.  No changes in her bowel or bladder habits.  Today, it sounds like Steffany might have been trying to get up and out of her wheelchair when it looked as though her legs gave out from underneath here.  Kamini describes to me that Steffany had a \"slow fall\" to the ground, landing on her knees.  Shortly thereafter, she began reporting right leg and hip pain.  I asked Kamini if this was more impulsive behavior for Steffany and she didn't really say one way or another.  Ultimately, EMS was called and she was brought to the emergency room for further evaluation.    In the Emergency Room, she was afebrile.  She was profoundly hypertensive with systolic blood pressures as high as the 220s.  O2 sats were initially stable on room air, but after she received IV pain medication she was requiring 1 liter nasal cannula.  Her physical exam was notable for tenderness to palpation with the right hip and the right leg.  Basic labs were notable for a sodium of 125 and a white blood cell count of 13.8.  X-ray imaging of the hip and knee were a right hip and knee were obtained and were negative for acute fracture.  In the Emergency Room, she was given a liter of normal saline, 2.5 mg of IV Valium and a total of 4 mg of IV morphine.  Additionally, she has been given 10 mg of IV hydralazine for persistent hypertension.  Plan at this juncture is to admit her to the hospital under observation status for ongoing evaluation and care.    When I saw the patient " in her room this afternoon, she again has aphasia.  She had minimal ability to participate in my visit.  At one point, she took out her hearing aids and handed them to Kamini and thus she was not really even able to hear the conversations that were being had.  She had just received a dose of morphine and spent most of visit with her eyes closed and appeared to be resting comfortably.    PAST MEDICAL HISTORY:    1.  Hypertension.  With a history of hypertensive urgency during last hospitalization in 2001.  2.  Hyperlipidemia.  3.  Osteoporosis.  4.  Rheumatoid arthritis.  5.  Psoriasis.  6.  Eczema.  7.  History of stroke in  with residual aphasia and right sided hemiparesis.  She is wheelchair bound at baseline.  8.  Macular degeneration.  9.  History of diverticulosis.  10.  History of a pelvic mass, status post total abdominal hysterectomy with bilateral salpingo-oophorectomy in .    PAST SURGICAL HISTORY:    1.  Right ORIF in .  2.  Ankle ORIF in .  3.  Total abdominal hysterectomy and bilateral salpingo-oophorectomy in .  4.  History of a hip fracture repair in .  5.  Remote appendectomy.  6.  History of anterior laparoscopic repair of sigmoid prolapse in the .  7.  Remote D and C.    FAMILY HISTORY:  Her mother had a history of colorectal cancer and  of colon cancer at the age of 89.  Her father had a history of cardiovascular disease and  at the age of 65.  Her brother had a history of lung cancer and  at the age of 65.    SOCIAL HISTORY:  She has a history of tobacco use, though reportedly quit smoking in the 1940s.  She drinks alcohol, the degree of which is not completely clear.  Kamini tells me today that they drink beers during the course of the day, but she was not completely forthcoming to me in regards to how much they actually drink.  She is wheelchair bound at baseline.  Kamini helps assist her with transfers.  Her and Kamini live in a home in  [place].  They have been roommates for the past 30 years now.  Recently paperwork was completed, that now designates Kamini as patient's primary medical power of  (per Care coordinators review of records during her last hospitalization.  This designation and got into her niece Joy who resides in [place], but now formal paperwork is in the chart that indicates Kamini is her medical decision maker.  Isreal is a second medical decision maker.    HOME MEDICATIONS:    1.  Tylenol 500-1000 mg every 6 hours as needed.  2.  Atenolol 50 mg daily.  3.  Plavix 75 mg daily, multivitamin.  4.  Omeprazole 40 mg p.o. daily.   5.  Lovastatin 40 mg at bedtime.  6.  Daily multivitamin.    ALLERGIES:  SIMVASTATIN CAUSES CRAMPS, LISINOPRIL CAUSES SWELLING AND RASH AND SHE HAS SEASONAL ALLERGIES.    REVIEW OF SYSTEMS:  Unable to complete a full 10-point review of systems per discussions with the patient.  But per my discussions with her roommate the review has otherwise been negative.    PHYSICAL EXAMINATION:    VITAL SIGNS:  Temperature 98.1, pulse 65, respirations 16, blood pressure 223/96, O2 sat 99% on 1 liter nasal cannula.  GENERAL:  The patient is an elderly, chronically ill-appearing female, lying quietly in the [place].  She appears to be resting comfortably.  She makes minimal attempts to interact with me during the course of my visit, she is in no acute distress.  HEENT:  Pupils equal, round combinations were intact.  Mucous membranes are moist.  CARDIOVASCULAR:  Heart rate and rhythm regular.  No murmurs, gallops, or rubs.  Pulses are +2 and symmetric in the upper extremities.  There is no lower extremity edema.  RESPIRATORY:  Lungs are clear to auscultation bilaterally.  No rales or rhonchi. No accessory muscle us.  ABDOMEN:  Soft, nontender, nondistended, positive bowel sounds throughout.  INTEGUMENT: Skin is warm and dry.  There are no rashes, lesions, jaundice or ecchymosis.  EXTREMITIES:  She does  have some tenderness with palpation of her right hip.  NEUROLOGIC:  The patient is aphasic.  Her cranial nerves II-XII appear grossly intact.  She has got right sided hemiparesis.  Sensation appears grossly intact to light touch.    LABORATORIES AND IMAGING:    BMP shows sodium of 125, potassium 3.7, creatinine of 4.9.  Lactate CK and LFTs were all within normal limits.  Albumin is 3.5, calcium 9.6, glucose is 126.  CBC showed a white count of 13.8, a hemoglobin of 15.3 and a platelet count of 397.    DIAGNOSTIC DATA:    An x-ray of the right knee showed degenerative neck was negative for fracture or joint malalignment.  It did show degenerative arthritic changes and a small knee joint effusion.  She had an intramedullary nail in the distal femur with interlocking screws.    X-ray of the right hip and pelvis showed no acute fracture in the right hip or femur.  She did have findings of loosening of previously placed hardware. The right hip joint was normally aligned.  She had changes also noted in her left hip, which were unchanged from imaging in 10/2018.    ASSESSMENT AND PLAN:  Steffany Brown is a 89-year-old female with a past medical history significant for hypertension and prior hypertensive urgency, hyperlipidemia, history of stroke with resulting right sided hemiplegia and expressive aphasia, for which she is maintained on Plavix, who presents to the Emergency Room today for evaluation of reported right hip pain after a fall.  Initial x-ray imaging was negative for acute fracture, but CT reports are pending.  Plan at this juncture is to admit her under observation status for ongoing evaluation and care.  1.  Right hip pain.  The patient was initially reported to have a fall out of her wheelchair earlier this morning.  It was later described to me that her legs gave out from underneath her and she kind of was able to slowly lower herself to the ground.  After this incident, she reports of right leg and hip  pain.  Initial x-ray imaging in the Emergency Room is negative for fracture.  CT has been ordered for further evaluation.  Given her history with prior joint procedures I have placed a consult to orthopedic service to review her imaging and their input on cause of pain and treatment options going forward is much appreciated.  She was given several doses of IV morphine in the Emergency Room.  She was also given some Valium for thoughts that some of her pain may have been due to muscle spasms.  At this juncture, we will continue pain management with Tylenol and IV Dilaudid.  My hope is that we can minimize narcotic use.  Considered PT consult, but again at baseline, she is wheelchair bound.  In the past, a home care was recommended, but the patient and her roommate Kamini both declined it.  Social work and care coordinator have been consulted to help coordinate care a plan at discharge.  2.  Hypertension with hypertensive urgency.  Her blood pressures were notably elevated during her recent stay, which was in part contributed thought to be secondary to her chest pain.  It was reported that her atenolol was supposed to be increased to 50 mg b.i.d. and she was supposed to follow up with a home nurse and her PCP for ongoing blood pressure management.  It sounds as though none of these things actually came to fruition.  On her MAR today pharmacy had documented her that she was taking her atenolol 50 mg once daily.  It was initially thought her elevated blood pressures today might be due in part to pain.  However, after she received several doses of IV morphine, Valium was resting comfortably her blood pressures are still elevated.  She will be given a dose of IV hydralazine in the Emergency Room.  Will continue atenolol as ordered.  I have also added amlodipine at 5 mg daily.  It sounds as though she has a history of allergies to ACE inhibitors in the past and I have deferred any use of diuretics given her low sodium.   Continue titration of blood pressure medicines as needed during this hospitalization.  Again, it is recommended she ultimately follow up with her PCP for ongoing blood pressure management.  3.  History of stroke with right sided hemiplegia and expressive aphasia.  Her stroke occurred many years ago.  She is maintained on atenolol, Plavix and a statin.  Her blood pressure management will be continued as above.  Will hold her Plavix until we have final imaging reports and get initial input from the orthopedic surgery team to ensure she does not need any surgical procedures this stay.  Her statin will be held given her admission under observation.  4.  Leukocytosis.  Her white blood cell count today is 13.8.  Etiology is unclear.  Her roommate Kamini did not voice any recent symptoms concerning for occult infection.  She has had no fevers, no respiratory illness, no changes in her urinary habits.  This could be reactive secondary to pain.  That being said, I will check a urinalysis and send for culture.  I note that when she was here last time she was thought to have a probable UTI and a urine culture ultimately grew E. coli, so she was treated with a course of cefuroxime.  Will repeat a CBC in the morning. If she were to spike a fever, would consider initiation of antibiotics.  5.  Hyponatremia.  This appears to be a new finding for her.  When she was here just over a month ago, her sodium levels were normal in the 130s.  Her sodium level today is 125.  I note her chloride level is mildly diminished too.  There were no reports of recent changes in oral intake.  The etiology of this hyponatremia is not clear to me at this time but could include hypovolemia, SIADH or beer potomania (given the amount she drinks and eats on a daily basis is unclear).  She was given a liter of normal saline in the Emergency Room.  I have ordered additional studies for further evaluation including a urine sodium and a urinalysis, though  these studies will be obtained after she has already been given a liter of normal saline.  A TSH has also been ordered.  Will encourage oral intake.  I will recheck a BMP this evening for interval assessment.  Will continue maintenance IV fluids with normal saline until she is steadily eating and drinking.  6.  Alcohol use.  She drinks beer daily, the degree of which is not known as her roommate Kamini is not completely forthcoming.  I do not see a history of previous concerns for withdrawal symptoms during her last hospitalization.  Monitor clinically for now.  Could institute CIWA protocol if needed.  7.  Hard of hearing.  The patient typically wears hearing aids.  It sounds as though they were not working in the Emergency Room, so she took them out.  I told her roommate Kamini that we would put them in a bag with a label and keep them here in case she wanted to try using them again.    DVT prophylaxis.  PCDs when in bed.    CODE STATUS:  DNR/DNI as per her medical decision maker Kamini who was at bedside today.  The patient's other friend Isreal Eaton, who is Kamini's sister (was also at bedside during this conversation who is an alternate medical decision maker, was in agreement with this).    Given the fact I anticipate she will be here for less than 2 midnight stays to coordinate the aforementioned treatment plan and ensure her medical conditions are stable, I have admitted under observation status.    Traci Ordoñez DO        D: 2021   T: 2021   MT: magno    Name:     MARY MAO  MRN:      [id number]        Account:     [id number]   :      1930           Admitted:    2021       Document: [id number]

## 2021-07-07 NOTE — PHARMACY-ADMISSION MEDICATION HISTORY
Pharmacy Medication History  Admission medication history interview status for the 7/7/2021  admission is complete. See EPIC admission navigator for prior to admission medications     Location of Interview: Patient room  Medication history sources: Patient's family/friend and Surescripts    Significant changes made to the medication list:  1. Removed cefuroxime (May 2021) and loratadine  2. Added: multivitamin    In the past week, patient estimated taking medication this percent of the time: unable to assess at time of interview.     Additional medication history information:   1. Patient was unable to provide medication history. Unable to verify recent fill for lovastatin- Unknown if patient is taking.    Medication reconciliation completed by provider prior to medication history? No    Time spent in this activity: 15 minutes    Prior to Admission medications    Medication Sig Last Dose Taking? Auth Provider   acetaminophen (TYLENOL) 500 MG tablet Take 500-1,000 mg by mouth every 6 hours as needed  prn Yes Reported, Patient   atenolol (TENORMIN) 50 MG tablet Take 1 tablet (50 mg) by mouth daily 7/6/2021 at am Yes Leo Hancock MD   clopidogrel (PLAVIX) 75 MG tablet Take 1 tablet (75 mg) by mouth daily 7/6/2021 at am Yes Leo Hancock MD   Multiple Vitamin (MULTIVITAMIN ADULT PO) Take 1 tablet by mouth daily 7/6/2021 at am Yes Unknown, Entered By History   omeprazole (PRILOSEC) 40 MG DR capsule Take 1 capsule (40 mg) by mouth every morning (before breakfast) 7/6/2021 at am Yes Carl Reynolds PA   lovastatin (MEVACOR) 40 MG tablet Take 1 tablet (40 mg) by mouth At Bedtime Unknown at Unknown time  Leo Hancock MD       The information provided in this note is only as accurate as the sources available at the time of update(s)

## 2021-07-07 NOTE — ED PROVIDER NOTES
History   Chief Complaint:  Leg Pain       The history is limited by the condition of the patient.      Steffany Brown is a 89 year old female on Plavix with history of stroke with aphasia who presents with leg pain. The patient is unable to provide further history.    The history is given by a roommate, who tells us that she saw the patient in some pain while walking earlier today, and then sustained a controlled fall to ground.  This was described as a low impact fall by the roommate.      Review of Systems   Unable to perform ROS: Mental status change   Musculoskeletal: Positive for myalgias.       Allergies:  Lipitor  Lisinopril  Seasonal allergies     Medications:  Tenormin  Cefrin  Plavix  Mevacor  Prilosec  Claratin    Past Medical History:    Age related macular degeneration  Cerebral infarction  Diverticulosis of colon  Eczema  Small bowel obstruction  Osteoporosis  Ischemic cerebrovascular disease  Pelvic mass  Colon polyps  Psoriasis  Rheumatoid arthritis  Hypertension  SIADH  Aphasia  Stroke  Hemiplegia  Hyponatremia  Hyperlipidemia    Past Surgical History:    Appendectomy  Colonoscopy x3  D & C  Hip fracture reduction  Hysterectomy  Open reduction internal fixation ankle   Sigmoid prolapse repair  Stress echo  Attempted angioplasty/stent of middle cerebral artery     Family History:    Mother: colon cancer  Father: cardiovascular problems  Brother: lung cancer    Social History:  Patient presents alone    Physical Exam     Patient Vitals for the past 24 hrs:   BP Temp Temp src Pulse Resp SpO2   07/07/21 1232 (!) 229/104 -- -- -- 16 99 %   07/07/21 1200 (!) 211/92 -- -- 60 16 95 %   07/07/21 1152 (!) 222/90 -- -- -- -- 97 %   07/07/21 1100 (!) 227/105 -- -- 71 -- 98 %   07/07/21 1030 (!) 215/96 -- -- 64 -- 98 %   07/07/21 0948 (!) 158/107 98.1  F (36.7  C) Temporal 74 16 93 %       Physical Exam  Vitals: reviewed by me  General: Pt seen on [facility], pleasant, cooperative, and alert to  conversation  Eyes: Tracking well, clear conjunctiva BL  ENT: MMM, midline trachea.   Lungs: No tachypnea, no accessory muscle use. No respiratory distress.   CV: Rate as above  Abd: Soft, non tender, no guarding, no rebound. Non distended  MSK: no joint effusion.  No evidence of trauma  Right hip tender to palpation, does have pain on logroll as well of the right lower extremity.  Posterior muscle groups including hamstrings very tight.  Full range of motion to knee and ankle.  Warm and well-perfused throughout, no evidence of trauma, no skin breaks.  Skin: No rash  Neuro: Nonverbal  Psych: Not RIS, no e/o AH/VH      Emergency Department Course     Imaging:  XR Knee Right 3 views  1.  Right knee negative for fracture or joint malalignment. Moderate  tricompartmental degenerative arthritic changes in the knee, with  joint space narrowing, subchondral sclerosis, and osteophytes,  greatest in the lateral compartment. Small knee joint effusion.     2.  Intramedullary nail in the distal femur, with interlocking screws.  As per radiology  XR Pelvis w Hip Right 2-3 views     1.  No acute fracture identified in the right hip or femur. An old  intratrochanteric fracture of the proximal right femur is status post  ORIF with an IM nail and a proximal cephalometric/femoral neck screw.  The fracture appears healed. No evidence of hardware complication.  Position/alignment of the hardware is unchanged. There is a moderate  amount of cystic lucency around the threads of the femoral neck screw,  in the femoral head, suggesting some amount of loosening. No femoral  head fracture is visualized. Distal interlocking screws are well  seated.     2.  The right hip joint is normally aligned with maintained joint  spacing. Mild tricompartmental degenerative changes are present in the  right knee.     3.  There has also been prior intratrochanteric fracture of the left  femur, which is also status post ORIF. These fractures  are  well-healed. Separately, the femoral head appears chronically  collapsed, with exposure of the tip of the screw to the joint space.  The acetabulum is also remodeled, such that the femoral head is  partially subluxed superiorly. This does not appear acute, although  these findings are new since October 2018  As per radiology    Laboratory:  CBC: WBC 13.8 (H), HGB 15.3,    CMP: Sodium 125 (L), Chloride 92 (L), Glucose 126 (H), Creatinine 0.49 (L) o/w WNL    CK total 72   Lactic acid (result time 1051) 1.3       Emergency Department Course:    Reviewed:  I reviewed nursing notes, vitals and past medical history    Assessments:  1002 I obtained history and examined the patient as noted above.   1220 I rechecked the patient and explained findings.     Consults:   1228 I spoke with Dr. Ordoñez, who agreed to admit the patient    Interventions:  1016 Valium 3.5 mg IV  1020 Morphine 1 mg IV    Disposition:  The patient was admitted to the hospital under the care of Dr. Ordoñez.       Impression & Plan     Medical Decision Making:   Steffany Brown is a very pleasant 89 year old female who presents to the emergency department withright leg pain. Unfortunately, she is aphasic from a previous CVA, and details and history are tough to come by. She does have a roommate here who said that the patient slowly declined in a controlled fall and landed on her right hip. It is unclear if she had leg pain before the fall or after. Xrays are unremarkable, but I am ordering a CT scan as she is still exquisitely tender to the right hip and has a positive log roll. No other evidence of trauma to her person, she definitely has some element of cramping as initially she did have muscle cramps on my exam, in the hamstring and calf regions, which resolved with valium. Her sodium is also low which may have predisposed her to cramping.REgardless of the cause of her leg pain, I do think she needs to come in to the hospital. I spoke with   White of the hospitalist team who kindly agreed to accept care of the patient.      Covid-19  Steffany Brown was evaluated during a global COVID-19 pandemic, which necessitated consideration that the patient might be at risk for infection with the SARS-CoV-2 virus that causes COVID-19.   Applicable protocols for evaluation were followed during the patient's care.   COVID-19 was considered as part of the patient's evaluation. The plan for testing is:  a test was obtained during this visit.    Diagnosis:    ICD-10-CM    1. Hip pain, right  M25.551    2. Pain of right lower leg  M79.661    3. Hyponatremia  E87.1        Discharge Medications:  New Prescriptions    No medications on file       Scribe Disclosure:  I, LEYDI BECKER, am serving as a scribe at 9:52 AM on 7/7/2021 to document services personally performed by Indio Wan MD based on my observations and the provider's statements to me.            Indio Wan MD  07/07/21 1241

## 2021-07-07 NOTE — ED NOTES
Bed: ED25  Expected date: 7/7/21  Expected time: 9:36 AM  Means of arrival: Ambulance  Comments:  513 89f leg pain, wc bound ETA [id number]

## 2021-07-07 NOTE — ED NOTES
[facility]  ED Nurse Handoff Report    ED Chief complaint: Leg Pain      ED Diagnosis:   Final diagnoses:   None       Code Status: Hospitalist to address    Allergies:   Allergies   Allergen Reactions     Lipitor [Atorvastatin Calcium] Cramps     Leg cramps     Lisinopril Swelling and Rash     Seasonal Allergies        Patient Story: Presents with pelvic pain and leg pain. Friend who lives with pt reports pt was sitting on side of bed this morning. Attempted to stand and slid down to ground. 911 activated. Pt is aphasic at baseline with [place].       Focused Assessment:  Alert and oriented. [place]. Wears hearing aids bilaterally. Skin intact. Grimaces in pain clenches fists with position changes. Purewick in place. Friend states she wears depends. Voided before purewick placement.  Hypertensive, despite pain intervention. ER MD aware with no further intervention; otherwise stable vital signs    Treatments and/or interventions provided: 1L NS, Morphine 1mg IV, Morphine 3mg IV, Valium 2.5mg IV, hydralazine 10mg iv  Patient's response to treatments and/or interventions: Pain decreased briefly.     To be done/followed up on inpatient unit:  Admission orders, social work consult for in-home services to be considered.    Does this patient have any cognitive concerns?: none    Activity level - Baseline/Home:  Wheelchair  Activity Level - Current:   bedrest    Patient's Preferred language: English   Needed?: No    Isolation: None  Infection: Not Applicable  Patient tested for COVID 19 prior to admission: YES  Bariatric?: No    Vital Signs:   Vitals:    07/07/21 1100 07/07/21 1152 07/07/21 1200 07/07/21 1232   BP: (!) 227/105 (!) 222/90 (!) 211/92 (!) 229/104   Pulse: 71  60    Resp:   16 16   Temp:       TempSrc:       SpO2: 98% 97% 95% 99%     Labs Ordered and Resulted from Time of ED Arrival Up to the Time of Departure from the ED   CBC WITH PLATELETS DIFFERENTIAL - Abnormal; Notable for the  following components:       Result Value    WBC 13.8 (*)     Absolute Neutrophil 10.8 (*)     Absolute Monocytes 1.5 (*)     All other components within normal limits   COMPREHENSIVE METABOLIC PANEL - Abnormal; Notable for the following components:    Sodium 125 (*)     Chloride 92 (*)     Glucose 126 (*)     Creatinine 0.49 (*)     All other components within normal limits   LACTIC ACID WHOLE BLOOD   CK TOTAL   SARS-COV-2 (COVID-19) VIRUS RT-PCR       Cardiac Rhythm:     Was the PSS-3 completed:   Yes  What interventions are required if any?               Family Comments: friend at bedside  OBS brochure/video discussed/provided to patient/family: N/A              Name of person given brochure if not patient: n/a              Relationship to patient: n/a    For the majority of the shift this patient's behavior was Green.   Behavioral interventions performed were none.    ED NURSE PHONE NUMBER: *[id number]

## 2021-07-07 NOTE — CONSULTS
"[facility]    Orthopedic Consultation    Steffany Brown MRN# [id number]   Age: 89 year old YOB: 1930     Date of Admission: 2021    Reason for consult: Right proximal thigh pain        Requesting physician: Traci Ordoñez       Level of consult: Consult, follow and place orders           Assessment and Plan:   Assessment:   Right proximal thigh hematoma        Plan:   Compression wrap applied to right thigh.  Keep intact until am.    Able to WBAT right LE with walker for transfers   Recheck hemoglobin in am  Pain medication prn            Chief Complaint:   Right thigh pain          History of Present Illness:   This patient is a 89 year old female who presents with the following condition requiring a hospital admission: right thigh pain   HPI per medical records and patient given patient's aphasia.    Steffany has been in her usual state of health.  Today, per reports, Steffany might have been trying to get up and out of her wheelchair when it looked as though her legs gave out from underneath here.  Kamini describes to me that Steffany had a \"slow fall\" to the ground, landing on her knees.  Shortly thereafter, she began reporting right leg and hip pain. Ultimately, EMS was called and she was brought to the emergency room for further evaluation.  Imaging was negative for fracture but did reveal a large hematoma within the medial thigh.  She has a history of bilateral long femorla IM Nails.  Per reports, patient is primarily wheelchair bound.            Past Medical History:     Past Medical History:   Diagnosis Date     Age-related macular degeneration, dry, both eyes 10/24/14    per vitreoretinal specialists     Cerebral infarction (H)     aphasia and chronic right sided waekness     Diverticulosis of colon (without mention of hemorrhage)     Several diverticuli seen on colonoscopy     Eczema      Family history of colon cancer     mother  from colon cancer age 89     " Hip fracture, intertrochanteric (H) 9/10/09    left hip fracture, S/P ORIF     History of small bowel obstruction 1985, 2011     Osteoporosis, unspecified      Other and unspecified hyperlipidemia      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     Other speech disturbance 3/05    chronic dysarthria since CVI     Pelvic mass in female 10/3/11    s/p LEODAN/BSO; pathology:  Serous cystadenofibroma, 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     Personal history of colonic polyps 2000     Psoriasis      Rheumatoid arthritis(714.0)      Unspecified essential hypertension              Past Surgical History:     Past Surgical History:   Procedure Laterality Date     C APPENDECTOMY  1985     COLONOSCOPY  5/14/03    Diverticulosis, single small polyp removed (at [facility])     COLONOSCOPY  6/26/08    diverticulosis, few scattered mild angioectasias, no polyps     COLONOSCOPY  7/17/2013    Procedure: COLONOSCOPY;  COLONOSCOPY ;  Surgeon: Romario Watters MD;  Location:  GI     D & C       FRACTURE TX, HIP RT/LT  9/10/09    Open reduction with IM nailing of left hip fracture using a gamma nail     HC COLONOSCOPY THRU STOMA W BIOPSY/CAUTERY TUMOR/POLYP/LESION  3/00    colonoscopy adenamatous polyp ([facility])     HYSTERECTOMY, LEODAN  10/03/11    s/p LEODAN/BSO to remove pelvic mass; pathology:  Serous cystadenofibroma, 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     OPEN REDUCTION INTERNAL FIXATION ANKLE  7/11/2014    Procedure: OPEN REDUCTION INTERNAL FIXATION ANKLE;  Surgeon: Emmanuel Gomez MD;  Location:  OR     OPEN REDUCTION INTERNAL FIXATION HIP NAILING Right 7/20/2016    Procedure: OPEN REDUCTION INTERNAL FIXATION HIP NAILING;  Surgeon: Jeronimo Giordano MD;  Location:  OR     RECTAL SURGERY  1971    anterior laproscopic repair of sigmoid prolapse     SMALL INTESTINE SURGERY       STRESS ECHO (METRO)  4/98    stress echo (negative)     SURGICAL HISTORY  OF -   3/12/05    attempted angioplasty/stent of right middle cerebral artery ( M1 segment), no effective             Social History:     Social History     Tobacco Use     Smoking status: Former Smoker     Quit date: 1948     Years since quittin.5     Smokeless tobacco: Never Used   Substance Use Topics     Alcohol use: Yes     Comment: rare             Family History:     Family History   Problem Relation Age of Onset     Cancer Mother          of colon ca age 89     Cancer - colorectal Mother      Cardiovascular Father          at age 65     Cancer Brother          of lung ca age 65             Immunizations:     VACCINE/DOSE   Diptheria   DPT   DTAP   HBIG   Hepatitis A   Hepatitis B   HIB   Influenza   Measles   Meningococcal   MMR   Mumps   Pneumococcal   Polio   Rubella   Small Pox   TDAP   Varicella   Zoster             Allergies:     Allergies   Allergen Reactions     Lipitor [Atorvastatin Calcium] Cramps     Leg cramps     Lisinopril Swelling and Rash     Seasonal Allergies              Medications:     Current Facility-Administered Medications   Medication     acetaminophen (TYLENOL) tablet 500-1,000 mg     amLODIPine (NORVASC) tablet 5 mg     atenolol (TENORMIN) tablet 50 mg     hydrALAZINE (APRESOLINE) injection 10 mg     HYDROmorphone (PF) (DILAUDID) injection 0.3 mg     melatonin tablet 1 mg     naloxone (NARCAN) injection 0.2 mg    Or     naloxone (NARCAN) injection 0.4 mg    Or     naloxone (NARCAN) injection 0.2 mg    Or     naloxone (NARCAN) injection 0.4 mg     ondansetron (ZOFRAN-ODT) ODT tab 4 mg    Or     ondansetron (ZOFRAN) injection 4 mg     sodium chloride 0.9% infusion             Review of Systems:   ROS:  10 point ROS neg other than the symptoms noted above in the HPI.          Physical Exam:   All vitals have been reviewed  Patient Vitals for the past 24 hrs:   BP Temp Temp src Pulse Resp SpO2   21 1451 (!) 149/55 -- -- 70 16 98 %   21 1408 (!) 144/54  -- -- -- -- --   07/07/21 1400 (!) 144/54 -- -- 62 -- 99 %   07/07/21 1352 (!) 165/64 -- -- -- 16 97 %   07/07/21 1342 (!) 207/84 -- -- -- -- --   07/07/21 1300 (!) 223/96 -- -- 65 -- 99 %   07/07/21 1232 (!) 229/104 -- -- 76 16 99 %   07/07/21 1230 (!) 229/104 -- -- 76 -- 99 %   07/07/21 1200 (!) 211/92 -- -- 60 16 95 %   07/07/21 1152 (!) 222/90 -- -- -- -- 97 %   07/07/21 1100 (!) 227/105 -- -- 71 -- 98 %   07/07/21 1030 (!) 215/96 -- -- 64 -- 98 %   07/07/21 0948 (!) 158/107 98.1  F (36.7  C) Temporal 74 16 93 %     No intake or output data in the 24 hours ending 07/07/21 1510      Physical Exam   Temp: 98.1  F (36.7  C) Temp src: Temporal BP: (!) 149/55 Pulse: 70   Resp: 16 SpO2: 98 % O2 Device: Nasal cannula Oxygen Delivery: 1 LPM  Vital Signs with Ranges  Temp:  [98.1  F (36.7  C)] 98.1  F (36.7  C)  Pulse:  [60-76] 70  Resp:  [16] 16  BP: (144-229)/() 149/55  SpO2:  [93 %-99 %] 98 %  0 lbs 0 oz    Constitutional: following commands.  Able to answer questions.    HEENT: Head atraumatic normocephalic.  Respiratory: Unlabored breathing no audible wheeze  Cardiovascular: Regular rate and rhythm per pulses   GI: Abdomen soft nontender nondistended.  Lymph/Hematologic: No lymphadenopathy in areas examined  Genitourinary:  No salgado, has purewick   Skin: No rashes, no cyanosis, no edema.  Musculoskeletal: On physical exam of the right lower extremity:   Skin: intact, no erythema or ecchymosis  Swelling: present along right anterior/medial proximal thigh  Alignment: neutral, left leg shortened on left vs right  Tenderness to palpation along the right proximal anterior and medial thigh.  Palpable swelling along anterior thigh.    ROM: pain with rotation of the right and left hip  Dorsi/Plantar flexion:  5/5  Calves:  Soft and non-tender  Distal pulses are intact and equal bilaterally  Sensation to light touch intact and equal bilaterally.     Neurologic: aphasia  Neuropsychiatric: stable          Data:   All  laboratory data reviewed  Results for orders placed or performed during the hospital encounter of 07/07/21   XR Pelvis w Hip Right 1 View     Status: None    Narrative    Examination:  XR PELVIS AND HIP RIGHT 1 VIEW    Date:  7/7/2021 11:25 AM     Clinical Information: Leg pain after trauma.    Comparison: Pelvis CT 10/25/2018..      Impression    Impression:    1.  No acute fracture identified in the right hip or femur. An old  intratrochanteric fracture of the proximal right femur is status post  ORIF with an IM nail and a proximal cephalometric/femoral neck screw.  The fracture appears healed. No evidence of hardware complication.  Position/alignment of the hardware is unchanged. There is a moderate  amount of cystic lucency around the threads of the femoral neck screw,  in the femoral head, suggesting some amount of loosening. No femoral  head fracture is visualized. Distal interlocking screws are well  seated.    2.  The right hip joint is normally aligned with maintained joint  spacing. Mild tricompartmental degenerative changes are present in the  right knee.    3.  There has also been prior intratrochanteric fracture of the left  femur, which is also status post ORIF. These fractures are  well-healed. Separately, the femoral head appears chronically  collapsed, with exposure of the tip of the screw to the joint space.  The acetabulum is also remodeled, such that the femoral head is  partially subluxed superiorly. This does not appear acute, although  these findings are new since October 2018.    YAS RILEY MD         SYSTEM ID:  [id number]   XR Knee Right 1/2 Views     Status: None    Narrative    Examination:  XR KNEE RT 1 /2 VW    Date:  7/7/2021 11:25 AM     Clinical Information: Knee pain after trauma.    Comparison: none.      Impression    Impression:    1.  Right knee negative for fracture or joint malalignment. Moderate  tricompartmental degenerative arthritic changes in the knee,  with  joint space narrowing, subchondral sclerosis, and osteophytes,  greatest in the lateral compartment. Small knee joint effusion.    2.  Intramedullary nail in the distal femur, with interlocking screws.    YAS RILEY MD         SYSTEM ID:  [id number]   CT Femur Thigh Right w/o Contrast     Status: None (Preliminary result)    Narrative    CT FEMUR THIGH RIGHT WITHOUT CONTRAST July 7, 2021 1:45 PM     HISTORY: Right hip pain.    TECHNIQUE: Axial scans were performed through the right hip and femur  to include the tip of the femoral gemma. Radiation dose for this scan  was reduced using automated exposure control, adjustment of the mA  and/or kV according to patient size, or iterative reconstruction  technique.    COMPARISON: X-rays from today.    FINDINGS: Healed right intertrochanteric fracture. Moderate cystic  lucency around the tip of the screw extending in the femoral head and  neck. This suggests resorption and/or some loosening. There is a  persistently displaced lesser trochanter fracture fragment. No lucency  surrounding the femoral gemma or distal cross fixation screws. No  evidence of acute fracture in the right hip or femur. A dynamic  compression screw extending into the femoral head and neck does  protrude approximately 1.6 cm into the soft tissues lateral to the  inferior greater trochanter.    Very prominent swelling of the medial and posterior medial thigh  musculature likely related to the vastus medialis and likely sartorius  musculature. This is likely related to a large hematoma.    Moderate Baker's cyst and likely some fluid extends in the pes  anserine bursa.      Impression    IMPRESSION:  1. Large hematoma involving the musculature of the medial thigh likely  involving the vastus medialis and sartorius muscle.  2. No evidence of acute fracture  3. Healed right intertrochanteric fracture. There are some resorptive  changes around the tip of the femoral head neck screw that could  be  related to loosening. The proximal end of the screw extends into the  soft tissues of the lateral proximal thigh adjacent to the greater  trochanter.  4. Moderate Baker's cyst and fluid in the pes anserine bursa.   CBC with platelets differential     Status: Abnormal   Result Value Ref Range    WBC 13.8 (H) 4.0 - 11.0 10e9/L    RBC Count 5.10 3.8 - 5.2 10e12/L    Hemoglobin 15.3 11.7 - 15.7 g/dL    Hematocrit 44.8 35.0 - 47.0 %    MCV 88 78 - 100 fl    MCH 30.0 26.5 - 33.0 pg    MCHC 34.2 31.5 - 36.5 g/dL    RDW 12.2 10.0 - 15.0 %    Platelet Count 397 150 - 450 10e9/L    Diff Method Automated Method     % Neutrophils 78.5 %    % Lymphocytes 8.2 %    % Monocytes 10.8 %    % Eosinophils 1.2 %    % Basophils 0.5 %    % Immature Granulocytes 0.8 %    Nucleated RBCs 0 0 /100    Absolute Neutrophil 10.8 (H) 1.6 - 8.3 10e9/L    Absolute Lymphocytes 1.1 0.8 - 5.3 10e9/L    Absolute Monocytes 1.5 (H) 0.0 - 1.3 10e9/L    Absolute Eosinophils 0.2 0.0 - 0.7 10e9/L    Absolute Basophils 0.1 0.0 - 0.2 10e9/L    Abs Immature Granulocytes 0.1 0 - 0.4 10e9/L    Absolute Nucleated RBC 0.0    Comprehensive metabolic panel     Status: Abnormal   Result Value Ref Range    Sodium 125 (L) 133 - 144 mmol/L    Potassium 3.7 3.4 - 5.3 mmol/L    Chloride 92 (L) 94 - 109 mmol/L    Carbon Dioxide 25 20 - 32 mmol/L    Anion Gap 8 3 - 14 mmol/L    Glucose 126 (H) 70 - 99 mg/dL    Urea Nitrogen 7 7 - 30 mg/dL    Creatinine 0.49 (L) 0.52 - 1.04 mg/dL    GFR Estimate 85 >60 mL/min/[1.73_m2]    GFR Estimate If Black >90 >60 mL/min/[1.73_m2]    Calcium 9.6 8.5 - 10.1 mg/dL    Bilirubin Total 0.6 0.2 - 1.3 mg/dL    Albumin 3.5 3.4 - 5.0 g/dL    Protein Total 8.0 6.8 - 8.8 g/dL    Alkaline Phosphatase 115 40 - 150 U/L    ALT 18 0 - 50 U/L    AST 21 0 - 45 U/L   Lactic acid whole blood     Status: None   Result Value Ref Range    Lactic Acid 1.3 0.7 - 2.0 mmol/L   CK total     Status: None   Result Value Ref Range    CK Total 72 30 - 225 U/L    Asymptomatic SARS-CoV-2 COVID-19 Virus (Coronavirus) by PCR     Status: None    Specimen: Nasopharyngeal   Result Value Ref Range    SARS-CoV-2 Virus Specimen Source Nasopharyngeal     SARS-CoV-2 PCR Result NEGATIVE     SARS-CoV-2 PCR Comment (Note)           Attestation:  I have reviewed today's vital signs, notes, medications, labs and imaging with Dr. Linder.  Amount of time performed on this consult: 30 minutes.    Jenifer Paredes PA-C

## 2021-07-07 NOTE — PROGRESS NOTES
RECEIVING UNIT ED HANDOFF REVIEW    ED Nurse Handoff Report was reviewed by: Charlotte Angeles RN on July 7, 2021 at 1:08 PM

## 2021-07-07 NOTE — PROGRESS NOTES
Observation goals:    -vital signs normal or at patient baseline- Not met, hypertensive  -adequate pain control on oral analgesics- Not met  -returns to baseline functional status- Not met  -labs improved- Not met, Na+ 125  Nurse to notify provider when observation goals have been met and patient is ready for discharge.

## 2021-07-08 PROBLEM — S80.11XA HEMATOMA OF RIGHT LOWER EXTREMITY, INITIAL ENCOUNTER: Status: ACTIVE | Noted: 2021-07-08

## 2021-07-08 LAB
ANION GAP SERPL CALCULATED.3IONS-SCNC: 5 MMOL/L (ref 3–14)
BUN SERPL-MCNC: 12 MG/DL (ref 7–30)
CALCIUM SERPL-MCNC: 8.6 MG/DL (ref 8.5–10.1)
CHLORIDE SERPL-SCNC: 101 MMOL/L (ref 94–109)
CO2 SERPL-SCNC: 26 MMOL/L (ref 20–32)
CREAT SERPL-MCNC: 0.7 MG/DL (ref 0.52–1.04)
ERYTHROCYTE [DISTWIDTH] IN BLOOD BY AUTOMATED COUNT: 12.5 % (ref 10–15)
GFR SERPL CREATININE-BSD FRML MDRD: 75 ML/MIN/{1.73_M2}
GLUCOSE SERPL-MCNC: 94 MG/DL (ref 70–99)
HCT VFR BLD AUTO: 32.5 % (ref 35–47)
HGB BLD-MCNC: 10.8 G/DL (ref 11.7–15.7)
HGB BLD-MCNC: 11 G/DL (ref 11.7–15.7)
LACTATE BLD-SCNC: 1.7 MMOL/L (ref 0.7–2)
MCH RBC QN AUTO: 30.3 PG (ref 26.5–33)
MCHC RBC AUTO-ENTMCNC: 33.2 G/DL (ref 31.5–36.5)
MCV RBC AUTO: 91 FL (ref 78–100)
PLATELET # BLD AUTO: 332 10E9/L (ref 150–450)
POTASSIUM SERPL-SCNC: 4.1 MMOL/L (ref 3.4–5.3)
RBC # BLD AUTO: 3.56 10E12/L (ref 3.8–5.2)
SODIUM SERPL-SCNC: 132 MMOL/L (ref 133–144)
WBC # BLD AUTO: 14.2 10E9/L (ref 4–11)

## 2021-07-08 PROCEDURE — 85018 HEMOGLOBIN: CPT | Performed by: PHYSICIAN ASSISTANT

## 2021-07-08 PROCEDURE — 250N000013 HC RX MED GY IP 250 OP 250 PS 637: Performed by: INTERNAL MEDICINE

## 2021-07-08 PROCEDURE — 83605 ASSAY OF LACTIC ACID: CPT | Performed by: INTERNAL MEDICINE

## 2021-07-08 PROCEDURE — 250N000011 HC RX IP 250 OP 636: Performed by: HOSPITALIST

## 2021-07-08 PROCEDURE — 258N000003 HC RX IP 258 OP 636: Performed by: INTERNAL MEDICINE

## 2021-07-08 PROCEDURE — 36415 COLL VENOUS BLD VENIPUNCTURE: CPT | Performed by: PHYSICIAN ASSISTANT

## 2021-07-08 PROCEDURE — G0378 HOSPITAL OBSERVATION PER HR: HCPCS

## 2021-07-08 PROCEDURE — 85027 COMPLETE CBC AUTOMATED: CPT | Performed by: INTERNAL MEDICINE

## 2021-07-08 PROCEDURE — 120N000004 HC R&B MS OVERFLOW

## 2021-07-08 PROCEDURE — 80048 BASIC METABOLIC PNL TOTAL CA: CPT | Performed by: INTERNAL MEDICINE

## 2021-07-08 PROCEDURE — 36415 COLL VENOUS BLD VENIPUNCTURE: CPT | Performed by: INTERNAL MEDICINE

## 2021-07-08 PROCEDURE — 96375 TX/PRO/DX INJ NEW DRUG ADDON: CPT

## 2021-07-08 PROCEDURE — 258N000003 HC RX IP 258 OP 636: Performed by: PHYSICIAN ASSISTANT

## 2021-07-08 PROCEDURE — 99232 SBSQ HOSP IP/OBS MODERATE 35: CPT | Performed by: PHYSICIAN ASSISTANT

## 2021-07-08 RX ORDER — CEFTRIAXONE 2 G/1
2 INJECTION, POWDER, FOR SOLUTION INTRAMUSCULAR; INTRAVENOUS EVERY 24 HOURS
Status: DISCONTINUED | OUTPATIENT
Start: 2021-07-08 | End: 2021-07-10 | Stop reason: HOSPADM

## 2021-07-08 RX ADMIN — SODIUM CHLORIDE: 9 INJECTION, SOLUTION INTRAVENOUS at 14:00

## 2021-07-08 RX ADMIN — CEFTRIAXONE SODIUM 2 G: 2 INJECTION, POWDER, FOR SOLUTION INTRAMUSCULAR; INTRAVENOUS at 06:39

## 2021-07-08 RX ADMIN — ACETAMINOPHEN 500 MG: 500 TABLET, FILM COATED ORAL at 09:50

## 2021-07-08 RX ADMIN — AMLODIPINE BESYLATE 5 MG: 5 TABLET ORAL at 08:21

## 2021-07-08 RX ADMIN — ATENOLOL 50 MG: 50 TABLET ORAL at 08:21

## 2021-07-08 RX ADMIN — SODIUM CHLORIDE: 9 INJECTION, SOLUTION INTRAVENOUS at 01:37

## 2021-07-08 ASSESSMENT — ACTIVITIES OF DAILY LIVING (ADL)
ADLS_ACUITY_SCORE: 25
ADLS_ACUITY_SCORE: 26

## 2021-07-08 NOTE — PROGRESS NOTES
MD Notification    Notified Person: MD    Notified Person Name: Alli Goodwin    Notification Date/Time: 7/7/21 @ 10:04 PM    Notification Interaction: Page    Purpose of Notification: Obs 28 BD. FYI, pt bladder scanned for 319. Orders for possible straight cath? Thank you.     Orders Received: Yes, orders received for intermittent catherterization.    Comments:

## 2021-07-08 NOTE — PLAN OF CARE
Observation goals PRIOR TO DISCHARGE     Comments:   -Diagnostic tests and consults completed and resulted-Not met     -Vital signs normal or at patient baseline -Met    -Adequate pain control on oral analgesics-Met     -Returns to baseline functional status-Not met    -labs improved -Not met    Nurse to notify provider when observation goals have been met and patient is ready for discharge.

## 2021-07-08 NOTE — PLAN OF CARE
Inpatient. Orientation unable to assess with previous stroke baseline. R arm contracted baseline. Up in room with assist of 2 and mechanical lift. Wheelchair baseline and can not stand at all. Wrap in place on right thigh. Some bruising present. Intake and output adequate, purewick in place. Tolerating regular diet, can feed herself but needs help ordering food. PIV infusing fluids @ 75 mL/hr. Hgb recheck in AM. Sodium level low but increasing from last level. Discharge pending, will need to look at safe discharging plans, pt lives with roommate who is her POA but is forgetful. Will need SW/CC input and PT assessment.

## 2021-07-08 NOTE — PROGRESS NOTES
Attempted getting patient out of bed this morning, patient was crying due to painfulness from standing. Spoke to provider and will wait for ortho about hgb. Will use lift as of now to move patient as needed.

## 2021-07-08 NOTE — UTILIZATION REVIEW
Admission Status; Secondary Review Determination    Under the authority of the Utilization Management Committee, the utilization review process indicated a secondary review on the above patient. The review outcome is based on review of the medical records, discussions with staff, and applying clinical experience noted on the date of the review.    (x) Inpatient Status Appropriate - This patient's medical care is consistent with medical management for inpatient care and reasonable inpatient medical practice.    RATIONALE FOR DETERMINATION: 89-year-old female with significant history of stroke with residual aphasia and right-sided hemiparesis, hypertension, rheumatoid arthritis who had an unexplained weakness and fall resulting in right hip pain and a significant hematoma.  Patient found to have leukocytosis and pyuria consistent with a UTI.  After initial intravenous antibiotics patient's white blood count is slightly worse on hospital day 2 as well as a significant 5 g drop in hemoglobin.  Patient will require greater than 2 nights in the hospital for ongoing management appropriate for inpatient care.    At the time of admission with the information available to the attending physician more than 2 nights Hospital complex care was anticipated, based on patient risk of adverse outcome if treated as outpatient and complex care required. Inpatient admission is appropriate based on the Medicare guidelines.    This document was produced using voice recognition software    The information on this document is developed by the utilization review team in order for the business office to ensure compliance. This only denotes the appropriateness of proper admission status and does not reflect the quality of care rendered.    The definitions of Inpatient Status and Observation Status used in making the determination above are those provided in the CMS Coverage Manual, Chapter 1 and Chapter 6, section  70.4.    Sincerely,    Roney Schilling MD  Utilization Review  Physician Advisor  [facility].

## 2021-07-08 NOTE — PLAN OF CARE
Observation goals PRIOR TO DISCHARGE     Comments:   -Diagnostic tests and consults completed and resulted-Not met     -Vital signs normal or at patient baseline -Met    -Adequate pain control on oral analgesics-Met     -Returns to baseline functional status-Not met    -labs improved -Not met    Nurse to notify provider when observation goals have been met and patient is ready for discharge.        Summary:   Pt is alert, unable to determine orientation because of severe aphasia and [place].VSS on RA, weaned off oxygen overnight.W/C bound baseline,AX2, total care/feed,kevin reg diet,incontinent, pure wick in place,draining cloudy irma urine.Started on I.V Rocephin for +ve urine result.IVF Nacl con@100.Ortho following, compression wrap right thigh, to stay in place per ortho.CC/SW consult pending.Lactic fired, ordered, result pending.

## 2021-07-08 NOTE — PROGRESS NOTES
Observation Goals  -diagnostic tests and consults completed and resulted   Not Met (Ortho following)  -vital signs normal or at patient baseline   Not Met (Pain)  -adequate pain control on oral analgesics   Met  -returns to baseline functional status   Not Met  -labs improved   Not Met (NA and Hgb)      Nurse to notify provider when observation goals have been met and patient is ready for discharge.

## 2021-07-08 NOTE — PROVIDER NOTIFICATION
MD Notification    Notified Person: MD    Notified Person Name:Kaushik    Notification Date/Time:7/8/21    Notification Interaction:paged    Purpose of Notification:Urine result came back positive last night, any new order?    Orders Received:I.V Rocephin daily, culture pending    Comments:

## 2021-07-08 NOTE — PROVIDER NOTIFICATION
MD Notification    Notified Person: MD    Notified Person Name: Dr. Reynolds    Notification Date/Time: 7/8/2021 9215    Notification Interaction:Web based paging    Purpose of Notification: Hgb went from 15.3 yesterday to 10.8 this  morning.     Orders Received: Ortho saw patient    Comments:

## 2021-07-08 NOTE — PROGRESS NOTES
[facility]    Hospitalist Progress Note    Date of Service (when I saw the patient): 07/08/2021    Assessment & Plan   Steffany Brown is a 89-year-old female with a past medical history significant for hypertension and prior hypertensive urgency, hyperlipidemia, history of stroke with resulting right sided hemiplegia and expressive aphasia, for which she is maintained on Plavix, who presents to the Emergency Room for evaluation of reported right hip pain after a fall.  Initial x-ray imaging was negative for acute fracture, but CT reports show evidence of large hematoma with the medial thigh, and some possible hardware loosening. Patient is registered under observation status for ongoing evaluation and care.    Right hip pain after mechanical fall  Right proximal thigh hematoma  Patient initially reported to have fallen out of her wheelchair the morning of presentation.  It was later stated that her legs gave out from underneath her and she was able to slowly lower herself to the ground.  Afterwards, she reported right leg and hip pain.  Initial x-ray imaging in the ED negative for fracture.   CT of the right femur shows large hematoma involving the musculature of the medial thigh likely involving the vastus medialis and sartorius muscle.  No evidence of fracture.  Possible loosening of femoral head neck screw.    --Orthopedic surgery consulted, input appreciated   --Continue current pain management with Tylenol and IV Dilaudid with a goal of minimizing narcotic use.    --PT can be consulted pending orthopedic surgery plans, but at baseline, she is wheelchair bound.   --In the past, a home care was recommended, but the patient and her roommate Kamini both declined it.  Social work and care coordinator have been consulted to help coordinate care a plan at discharge.    Acute blood loss anemia  Hemoglobin dropped from 15.3 down to 10.8 this morning.  On repeat at noon is 11.0.  Most likely  related to hematoma.  No evidence of ongoing bleeding.  No evidence of compartment syndrome.  Vital signs are stable.  --Continue to hold Plavix  --Monitor hemoglobin, full CBC in a.m.  --Appreciate input from orthopedic surgery    Hypertension with hypertensive urgency.    Blood pressure notably elevated during recent stay, which was in part was thought to be secondary to her chest pain.  It was reported that her atenolol was supposed to be increased to 50 mg b.i.d. and she was supposed to follow up with a home nurse and her PCP for ongoing blood pressure management.  It sounds as though none of these things actually happenened.  On her MAR today pharmacy had documented her that she was taking her atenolol 50 mg once daily.    It was initially thought her elevated blood pressures this presentation might be due in part to pain.  However, after she received several doses of IV morphine, Valium was resting comfortably her blood pressures are still elevated.    --Continue atenolol  ---Added amlodipine 5 mg daily.    --History of allergies to ACE inhibitors in the past and will defer any use of diuretics given her low sodium.    --Continue titration of blood pressure medicines as needed during this hospitalization.    --PCP follow-up for ongoing blood pressure management.    History of stroke with right sided hemiplegia and expressive aphasia.  Her stroke occurred many years ago.  She is maintained on atenolol, Plavix and a statin.   --Blood pressure management will be continued as above.    --Hold Plavix until we have further input from orthopedic surgery and stable hemoglobin levels.    --Her statin will be held given observation status.    Urinary tract infection    WBC elevated at 13.8--14.2.  Her roommate Kamini did not voice any recent symptoms concerning for occult infection.  She has had no fevers, no respiratory illness, no changes in her urinary habits.  This could be reactive secondary to pain.    --UA  abnormal  --Urine culture pending  --Last time she was thought to have a probable UTI and a urine culture ultimately grew E. coli, so she was treated with a course of cefuroxime.    --Start IV ceftriaxone  --Repeat CBC in a.m. and follow fever and urine culture    Hyponatremia.    Appears to be a new finding for her.  When she was here just over a month ago, her sodium levels were normal in the 130s.  Her sodium level on admission is 125.  Noted the chloride level is mildly diminished too.  There were no reports of recent changes in oral intake.  Etiology of this hyponatremia is not clear at this time but could include hypovolemia, SIADH or beer potomania (given the amount she drinks and eats on a daily basis is unclear).    Patient was given a liter of normal saline in the ED. Additional studies ordered for further evaluation including a urine sodium and a urinalysis, though these studies were obtained after she has already been given a liter of normal saline.    TSH is within normal limits.    --Repeat sodium up to 132  --Will encourage oral intake.    --Recheck a BMP in a.m.  --Will continue maintenance IV fluids (decreased to 75 mL/h) with normal saline until she is steadily eating and drinking.    Alcohol use.    She drinks beer daily, the degree of which is not known as her roommate Kamini cannot quantify it.  Do not see a history of previous concerns for withdrawal symptoms during her last hospitalization.    --Monitor clinically for now.  Could institute CIWA protocol if needed.    Hard of hearing.  The patient typically wears hearing aids.  Is a been brought to the hospital and can be used by patient.      DVT prophylaxis.  PCDs when in bed.     CODE STATUS:  DNR/DNI   as per her medical decision maker Kamini who was at bedside 7/7.  The patient's other friend Isreal Eaton, who is Kamini's sister (was also at bedside during this conversation who is an alternate medical decision maker, was in agreement with  this).    Disposition: Expected discharge in 1+ days pending orthopedic surgery input, stable hemoglobin, as well as safe disposition planning.  Patient and longtime roommate Kamini have previously declined placement, but probably need to revisit this during this hospitalization.  Social work consulted.    Carl Reynolds    Interval History   Patient resting comfortably in bed on my arrival.  She does not have any pain at rest, does have pain with palpation of the right thigh and calf.  Moves all 4 extremities.  Right hand is contracted from previous stroke.  Denies chest pain, shortness of breath, abdominal pain, nausea or vomiting.  Visitors including sister and roommate at bedside.    -Data reviewed today: I reviewed all new labs and imaging results over the last 24 hours.    Physical Exam   Temp: (P) 96.8  F (36  C) Temp src: (P) Oral BP: (!) (P) 144/50 Pulse: (P) 61   Resp: (P) 17 SpO2: (P) 94 % O2 Device: (P) None (Room air) Oxygen Delivery: 1 LPM(turned off)  There were no vitals filed for this visit.  Vital Signs with Ranges  Temp:  [96.2  F (35.7  C)-98.1  F (36.7  C)] (P) 96.8  F (36  C)  Pulse:  [57-76] (P) 61  Resp:  [16-18] (P) 17  BP: (112-229)/() (P) 144/50  SpO2:  [93 %-100 %] (P) 94 %  I/O last 3 completed shifts:  In: -   Out: 440 [Urine:440]    Constitutional: Elderly and frail-appearing adult female.  Alert, oriented to self, grossly to situation.  Cooperative, lying in bed in NAD.  Respiratory:  Lungs CTAB.  No crackles, wheezes, or rhonchi, no labored breathing.  Cardiovascular:  Heart regular rate and rhythm, no murmurs, no edema.  GI:  Abdomen soft, NT/ND and with normoactive BS  Skin/Integumen:  Warm, dry, non-diaphoretic.  MSK: CMS x4 intact.  Right thigh wrapped with Ace dressing.  Firmness under palpation of medial right thigh, extending behind the knee and to upper calf.    Medications     sodium chloride 100 mL/hr at 07/08/21 0137       amLODIPine  5 mg Oral Daily      atenolol  50 mg Oral Daily     cefTRIAXone  2 g Intravenous Q24H       Data   Recent Labs   Lab 07/08/21  0621 07/07/21  1007   WBC 14.2* 13.8*   HGB 10.8* 15.3   MCV 91 88    397   * 125*   POTASSIUM 4.1 3.7   CHLORIDE 101 92*   CO2 26 25   BUN 12 7   CR 0.70 0.49*   ANIONGAP 5 8   NICA 8.6 9.6   GLC 94 126*   ALBUMIN  --  3.5   PROTTOTAL  --  8.0   BILITOTAL  --  0.6   ALKPHOS  --  115   ALT  --  18   AST  --  21       Recent Results (from the past 24 hour(s))   XR Pelvis w Hip Right 1 View    Narrative    Examination:  XR PELVIS AND HIP RIGHT 1 VIEW    Date:  7/7/2021 11:25 AM     Clinical Information: Leg pain after trauma.    Comparison: Pelvis CT 10/25/2018..      Impression    Impression:    1.  No acute fracture identified in the right hip or femur. An old  intratrochanteric fracture of the proximal right femur is status post  ORIF with an IM nail and a proximal cephalometric/femoral neck screw.  The fracture appears healed. No evidence of hardware complication.  Position/alignment of the hardware is unchanged. There is a moderate  amount of cystic lucency around the threads of the femoral neck screw,  in the femoral head, suggesting some amount of loosening. No femoral  head fracture is visualized. Distal interlocking screws are well  seated.    2.  The right hip joint is normally aligned with maintained joint  spacing. Mild tricompartmental degenerative changes are present in the  right knee.    3.  There has also been prior intratrochanteric fracture of the left  femur, which is also status post ORIF. These fractures are  well-healed. Separately, the femoral head appears chronically  collapsed, with exposure of the tip of the screw to the joint space.  The acetabulum is also remodeled, such that the femoral head is  partially subluxed superiorly. This does not appear acute, although  these findings are new since October 2018.    YAS RILEY MD         SYSTEM ID:  [id number]   XR Knee  Right 1/2 Views    Narrative    Examination:  XR KNEE RT 1 /2 VW    Date:  7/7/2021 11:25 AM     Clinical Information: Knee pain after trauma.    Comparison: none.      Impression    Impression:    1.  Right knee negative for fracture or joint malalignment. Moderate  tricompartmental degenerative arthritic changes in the knee, with  joint space narrowing, subchondral sclerosis, and osteophytes,  greatest in the lateral compartment. Small knee joint effusion.    2.  Intramedullary nail in the distal femur, with interlocking screws.    YAS RILEY MD         SYSTEM ID:  [id number]   CT Femur Thigh Right w/o Contrast    Narrative    CT FEMUR THIGH RIGHT WITHOUT CONTRAST July 7, 2021 1:45 PM     HISTORY: Right hip pain.    TECHNIQUE: Axial scans were performed through the right hip and femur  to include the tip of the femoral gemma. Radiation dose for this scan  was reduced using automated exposure control, adjustment of the mA  and/or kV according to patient size, or iterative reconstruction  technique.    COMPARISON: X-rays from today.    FINDINGS: Healed right intertrochanteric fracture. Moderate cystic  lucency around the tip of the screw extending in the femoral head and  neck. This suggests resorption and/or some loosening. There is a  persistently displaced lesser trochanter fracture fragment. No lucency  surrounding the femoral gemma or distal cross fixation screws. No  evidence of acute fracture in the right hip or femur. A dynamic  compression screw extending into the femoral head and neck does  protrude approximately 1.6 cm into the soft tissues lateral to the  inferior greater trochanter.    Very prominent swelling of the medial and posterior medial thigh  musculature likely related to the vastus medialis and likely sartorius  musculature. This is likely related to a large hematoma.    Moderate Baker's cyst and likely some fluid extends in the pes  anserine bursa.      Impression    IMPRESSION:  1.  Large hematoma involving the musculature of the medial thigh likely  involving the vastus medialis and sartorius muscle.  2. No evidence of acute fracture  3. Healed right intertrochanteric fracture. There are some resorptive  changes around the tip of the femoral head neck screw that could be  related to loosening. The proximal end of the screw extends into the  soft tissues of the lateral proximal thigh adjacent to the greater  trochanter.  4. Moderate Baker's cyst and fluid in the pes anserine bursa.    AVANI SANDOVAL MD         SYSTEM ID:  [id number]

## 2021-07-08 NOTE — PROGRESS NOTES
Orthopedic Surgery  Steffany FRANCY Kevin  7/8/2021  Admit Date:  7/7/2021  Right proximal thigh hematoma    Patient resting comfortably in chair  No pain at rest    Able to range the hip with much less discomfort today  Compartments remain soft with compression.    Swelling remains stable  Ecchymosis right anterior thigh  Able to dorsi and plantar flex  Pulses present    Hemoglobin 15.3-->10.8-->11.0    Plan:    Keep compression wrap applied to right thigh.  Ok to remove tomorrow am.    Able to WBAT right LE with walker for transfers.   Recheck hemoglobin again in am  Pain medication prn            Jenifer Paredes PA-C

## 2021-07-08 NOTE — PROGRESS NOTES
VSS on 1.5L O2. Total cares. Repo q2hrs. Purewick in place, patent. Aphasia. C/o BRAIN upper leg pain, R upper thigh hematoma, RICKY, ortho wrapped in compression bandage until AM. IV dilaudid and Tylenol given for pain. UA collected, pending results. [place], hearing aids. Regular diet, tolerating well. R arm contracted, baseline. Baseline wheelchair bound. Na+ 125, NS running at 100ml/hr. SW consulted. Continue to monitor.

## 2021-07-09 LAB
ANION GAP SERPL CALCULATED.3IONS-SCNC: 1 MMOL/L (ref 3–14)
BACTERIA SPEC CULT: ABNORMAL
BUN SERPL-MCNC: 9 MG/DL (ref 7–30)
CALCIUM SERPL-MCNC: 8.3 MG/DL (ref 8.5–10.1)
CHLORIDE SERPL-SCNC: 106 MMOL/L (ref 94–109)
CO2 SERPL-SCNC: 29 MMOL/L (ref 20–32)
CREAT SERPL-MCNC: 0.55 MG/DL (ref 0.52–1.04)
ERYTHROCYTE [DISTWIDTH] IN BLOOD BY AUTOMATED COUNT: 12.8 % (ref 10–15)
GFR SERPL CREATININE-BSD FRML MDRD: 82 ML/MIN/{1.73_M2}
GLUCOSE SERPL-MCNC: 89 MG/DL (ref 70–99)
HCT VFR BLD AUTO: 30.8 % (ref 35–47)
HGB BLD-MCNC: 10.4 G/DL (ref 11.7–15.7)
HGB BLD-MCNC: 9.9 G/DL (ref 11.7–15.7)
LACTATE BLD-SCNC: 1.1 MMOL/L (ref 0.7–2)
Lab: ABNORMAL
MCH RBC QN AUTO: 29.6 PG (ref 26.5–33)
MCHC RBC AUTO-ENTMCNC: 32.1 G/DL (ref 31.5–36.5)
MCV RBC AUTO: 92 FL (ref 78–100)
PLATELET # BLD AUTO: 299 10E9/L (ref 150–450)
POTASSIUM SERPL-SCNC: 4.1 MMOL/L (ref 3.4–5.3)
RBC # BLD AUTO: 3.35 10E12/L (ref 3.8–5.2)
SODIUM SERPL-SCNC: 136 MMOL/L (ref 133–144)
SPECIMEN SOURCE: ABNORMAL
WBC # BLD AUTO: 12 10E9/L (ref 4–11)

## 2021-07-09 PROCEDURE — 83605 ASSAY OF LACTIC ACID: CPT | Performed by: PHYSICIAN ASSISTANT

## 2021-07-09 PROCEDURE — 36415 COLL VENOUS BLD VENIPUNCTURE: CPT | Performed by: PHYSICIAN ASSISTANT

## 2021-07-09 PROCEDURE — 250N000013 HC RX MED GY IP 250 OP 250 PS 637: Performed by: INTERNAL MEDICINE

## 2021-07-09 PROCEDURE — 258N000003 HC RX IP 258 OP 636: Performed by: PHYSICIAN ASSISTANT

## 2021-07-09 PROCEDURE — 250N000011 HC RX IP 250 OP 636: Performed by: HOSPITALIST

## 2021-07-09 PROCEDURE — 250N000011 HC RX IP 250 OP 636: Performed by: INTERNAL MEDICINE

## 2021-07-09 PROCEDURE — 85018 HEMOGLOBIN: CPT | Performed by: PHYSICIAN ASSISTANT

## 2021-07-09 PROCEDURE — 80048 BASIC METABOLIC PNL TOTAL CA: CPT | Performed by: PHYSICIAN ASSISTANT

## 2021-07-09 PROCEDURE — 85027 COMPLETE CBC AUTOMATED: CPT | Performed by: PHYSICIAN ASSISTANT

## 2021-07-09 PROCEDURE — 99232 SBSQ HOSP IP/OBS MODERATE 35: CPT | Performed by: PHYSICIAN ASSISTANT

## 2021-07-09 PROCEDURE — 120N000001 HC R&B MED SURG/OB

## 2021-07-09 RX ADMIN — SODIUM CHLORIDE: 9 INJECTION, SOLUTION INTRAVENOUS at 05:35

## 2021-07-09 RX ADMIN — ATENOLOL 50 MG: 50 TABLET ORAL at 08:56

## 2021-07-09 RX ADMIN — CEFTRIAXONE SODIUM 2 G: 2 INJECTION, POWDER, FOR SOLUTION INTRAMUSCULAR; INTRAVENOUS at 05:38

## 2021-07-09 RX ADMIN — HYDROMORPHONE HYDROCHLORIDE 0.3 MG: 1 INJECTION, SOLUTION INTRAMUSCULAR; INTRAVENOUS; SUBCUTANEOUS at 01:39

## 2021-07-09 RX ADMIN — AMLODIPINE BESYLATE 5 MG: 5 TABLET ORAL at 08:57

## 2021-07-09 RX ADMIN — ACETAMINOPHEN 500 MG: 500 TABLET, FILM COATED ORAL at 04:20

## 2021-07-09 ASSESSMENT — ACTIVITIES OF DAILY LIVING (ADL)
ADLS_ACUITY_SCORE: 31
ADLS_ACUITY_SCORE: 27
ADLS_ACUITY_SCORE: 27
ADLS_ACUITY_SCORE: 26
ADLS_ACUITY_SCORE: 27
ADLS_ACUITY_SCORE: 27

## 2021-07-09 NOTE — PROGRESS NOTES
[facility]    Hospitalist Progress Note    Date of Service (when I saw the patient): 07/09/2021    Assessment & Plan   Steffany Brown is a 89-year-old female with a past medical history significant for hypertension and prior hypertensive urgency, hyperlipidemia, history of stroke with resulting right sided hemiplegia and expressive aphasia, for which she is maintained on Plavix, who presents to the Emergency Room for evaluation of reported right hip pain after a fall.  Initial x-ray imaging was negative for acute fracture, but CT reports show evidence of large hematoma with the medial thigh, and some possible hardware loosening. Patient is registered under observation status for ongoing evaluation and care.    Right hip pain after mechanical fall  Right proximal thigh hematoma  Patient initially reported to have fallen out of her wheelchair the morning of presentation.  It was later stated that her legs gave out from underneath her and she was able to slowly lower herself to the ground.  Afterwards, she reported right leg and hip pain.  Initial x-ray imaging in the ED negative for fracture.   CT of the right femur shows large hematoma involving the musculature of the medial thigh likely involving the vastus medialis and sartorius muscle.  No evidence of fracture.  Possible loosening of femoral head neck screw.    --Orthopedic surgery consulted, input appreciated   --Continue current pain management with Tylenol and IV Dilaudid with a goal of minimizing narcotic use.    --PT is consulted, but at baseline, patient is wheelchair bound.   --In the past, a TCU or home care was recommended, but the patient and her roommate Kamini both declined it.  Social work and care coordinator have been consulted to help coordinate care a plan at discharge.  ----7/9: Patient's hemoglobin still trending down, but seems to have stabilized.  Per Ortho, reapply Ace if swelling increases in right thigh.  Able to  weight-bear as tolerated with walker for transfers.  Could consider CTA if there is concern for active bleed, but overall patient is improving.    Acute blood loss anemia  Hemoglobin dropped from 15.3 down to 10.8.  This seems to be stabilizing.  Most likely related to hematoma.  No evidence of ongoing bleeding.  No evidence of compartment syndrome.  Vital signs are stable.  Recent Labs   Lab 07/09/21  1528 07/09/21  0622 07/08/21  1135 07/08/21  0621 07/07/21  1007   HGB 10.4* 9.9* 11.0* 10.8* 15.3     --Continue to hold Plavix  --Monitor hemoglobin, full CBC in a.m.  --Appreciate input from orthopedic surgery    Hypertension with hypertensive urgency.    BP notably elevated during recent stay, which was in part was thought to be secondary to her chest pain.  It was reported that her atenolol was supposed to be increased to 50 mg b.i.d. and she was supposed to follow up with a home nurse and her PCP for ongoing blood pressure management.  It sounds as though none of these things actually happenened.  On her MAR, pharmacy had documented she was taking her atenolol 50 mg once daily.    It was initially thought her elevated blood pressures this presentation might be due in part to pain.  However, after she received several doses of IV morphine, Valium was resting comfortably her blood pressures were still elevated.    --Continue atenolol 50 mg daily, consider increasing to 50 twice daily as above  --Added amlodipine 5 mg daily.    --History of allergies to ACE inhibitors in the past and will defer any use of diuretics given her low sodium.    --Continue titration of blood pressure medicines as needed during this hospitalization.    --PCP follow-up for ongoing blood pressure management.    History of stroke with right sided hemiplegia and expressive aphasia.  Her stroke occurred many years ago.  She is maintained on atenolol, Plavix and a statin.   --Blood pressure management will be continued as above.    --Hold  Plavix for now, can likely resume if hemoglobin levels remained stable and if cleared by orthopedic surgery.  --Her statin can be resumed at discharge    Urinary tract infection    WBC elevated at 13.8--14.2.  Her roommate Kamini did not voice any recent symptoms concerning for occult infection.  She has had no fevers, no respiratory illness, no changes in her urinary habits.  This could be reactive secondary to pain.    --UA abnormal  --Urine culture growing E. coli, resistant to penicillin, otherwise sensitive  --Continue IV ceftriaxone, started 7/8 with plans to transition to p.o. Abx at discharge    Hyponatremia.    Appears to be a new finding for her.  When she was here just over a month ago, her sodium levels were normal in the 130s.  Her sodium level on admission is 125.  Noted the chloride level is mildly diminished too.  There were no reports of recent changes in oral intake.  Etiology of this hyponatremia is not clear at this time but could include hypovolemia, SIADH or beer potomania (given the amount she drinks and eats on a daily basis is unclear).    Patient was given a liter of normal saline in the ED. Additional studies ordered for further evaluation including a urine sodium and a urinalysis, though these studies were obtained after she has already been given a liter of normal saline.    TSH is within normal limits.    --Repeat sodium up to 132--136  --Encourage oral intake.    --Recheck BMP in a.m.  --Discontinue IV fluids today    Alcohol use.    She drinks beer daily, the degree of which is not known as her roommate Kamini cannot quantify it.  Do not see a history of previous concerns for withdrawal symptoms during her last hospitalization.    --Monitor clinically for now.  Could institute CIWA protocol if needed, but currently no evidence of withdrawal symptoms.    Hard of hearing.  The patient typically wears hearing aids, these have been brought to the hospital and can be used by patient.       DVT prophylaxis.  PCDs when in bed.     CODE STATUS:  DNR/DNI   as per her medical decision maker Kamini who was at bedside 7/7.  The patient's other friend Isreal Eaton, who is Kamini's sister (was also at bedside during this conversation who is an alternate medical decision maker, was in agreement with this).    Disposition: Expected discharge in 1-2 days pending orthopedic surgery input, stable hemoglobin, as well as safe disposition planning.  Patient and longtime roommate Kamini have previously declined placement, but probably need to revisit this during this hospitalization.  Social work consulted.  Discussed with patient and Kamini today 7/9.     Carl Indio     Interval History   Patient resting comfortably in bed on my arrival.  She does not have any pain at rest, does have pain with palpation of the right thigh and calf.  Moves all 4 extremities.  Right hand is contracted from previous stroke.  Denies chest pain, shortness of breath, abdominal pain, nausea or vomiting.  Swelling in the right thigh appears slightly increased from yesterday, however Ace wrap is no longer on.    -Data reviewed today: I reviewed all new labs and imaging results over the last 24 hours.    Physical Exam   Temp: 96.2  F (35.7  C) Temp src: Axillary BP: 133/45 Pulse: 58   Resp: 18 SpO2: 99 % O2 Device: Nasal cannula Oxygen Delivery: 1 LPM  There were no vitals filed for this visit.  Vital Signs with Ranges  Temp:  [96.1  F (35.6  C)-97.4  F (36.3  C)] 96.2  F (35.7  C)  Pulse:  [58-70] 58  Resp:  [16-20] 18  BP: (115-133)/(44-56) 133/45  SpO2:  [88 %-99 %] 99 %  I/O last 3 completed shifts:  In: 510 [P.O.:510]  Out: 950 [Urine:950]    Constitutional: Elderly and frail-appearing adult female.  Alert, oriented to self, and loosely to situation only.  Cooperative, lying in bed in NAD.  Respiratory:  Lungs CTAB.  No crackles, wheezes, or rhonchi, no labored breathing.  Cardiovascular:  Heart regular rate and rhythm, no  murmurs, no edema.  GI:  Abdomen soft, NT/ND and with normoactive BS  Skin/Integumen:  Warm, dry, non-diaphoretic.   MSK: CMS x4 intact.  Right thigh swollen.  Firmness under palpation of medial right thigh, extending behind the knee and to upper calf.  Remainder of limbs appear atraumatic    Medications     sodium chloride 75 mL/hr at 07/09/21 0535       amLODIPine  5 mg Oral Daily     atenolol  50 mg Oral Daily     cefTRIAXone  2 g Intravenous Q24H       Data   Recent Labs   Lab 07/09/21  1528 07/09/21  0622 07/08/21  1135 07/08/21  0621 07/07/21  1007   WBC  --  12.0*  --  14.2* 13.8*   HGB 10.4* 9.9* 11.0* 10.8* 15.3   MCV  --  92  --  91 88   PLT  --  299  --  332 397   NA  --  136  --  132* 125*   POTASSIUM  --  4.1  --  4.1 3.7   CHLORIDE  --  106  --  101 92*   CO2  --  29  --  26 25   BUN  --  9  --  12 7   CR  --  0.55  --  0.70 0.49*   ANIONGAP  --  1*  --  5 8   NICA  --  8.3*  --  8.6 9.6   GLC  --  89  --  94 126*   ALBUMIN  --   --   --   --  3.5   PROTTOTAL  --   --   --   --  8.0   BILITOTAL  --   --   --   --  0.6   ALKPHOS  --   --   --   --  115   ALT  --   --   --   --  18   AST  --   --   --   --  21       No results found for this or any previous visit (from the past 24 hour(s)).

## 2021-07-09 NOTE — PLAN OF CARE
Date/Time 7/8/21 [id number]  Mental Status: RICKY, pt has severe aphasia  Activity/dangle: Lift, turn/repo  Diet: Regular  Pain: Dilaudid given x 1  [place]/Voiding: Purewick in place  Tele/Restraints/Iso: NA  02/LDA: PIV infusing NS at 75 mL/h  D/C Date: Per MD note, Expected discharge in 1+ days pending orthopedic surgery input, stable hemoglobin, as well as safe disposition planning.  Other Info: R upper thigh wrapped in compression bandage. [place], hearing aids in room. R arm contracted, baseline. Ortho following.    Lactic acid alert fired, results pending.

## 2021-07-09 NOTE — PROGRESS NOTES
Pt is transferring to station [id number] at [facility] [place] time w/ all pt's belongings including hearing aids. Right thigh hematoma,ACE wrap removed this morning, refer to ortho note. Hgb trending down, recheck again later today.

## 2021-07-09 NOTE — PLAN OF CARE
Unable to assess orientation due to severe aphasia. R sided weakness at baseline. Appears to be in no pain. Ax2 with lift. Turn and repo. Wheelchair bound at baseline. VSS on 1 L nc. Tolerating regular diet. IVSL. Purewick in place. R anterior thigh hematoma/bruising present. Hemoglobin trending up and last value 10.4. On antibiotics for UTI. Discharge pending.

## 2021-07-09 NOTE — PROGRESS NOTES
Orthopedic Surgery  Steffany FRANCY Kevin  7/8/2021  Admit Date:  7/7/2021  Right proximal thigh hematoma     Patient resting comfortably in chair  No pain at rest  She feels her pain has improved today vs yesterday      Able to range the hip without pain   Compartments remain soft with compression.    Swelling remains along proximal thigh but has decreased distally.    Ecchymosis right anterior thigh and groin  Able to dorsi and plantar flex  Pain with active SLR but able to hold leg up with hip flexion   Pulses present     Hemoglobin 15.3-->10.8-->11.0--> 9     Plan:    Re-apply ace if swelling increases in right thigh.    Able to WBAT right LE with walker for transfers.   Recheck hemoglobin this afternoon.  If continues to trend down, could consider a CTA if concern for active bleed but overall patient is improving.    Pain medication prn

## 2021-07-09 NOTE — PROGRESS NOTES
Pt alert, unable to determine orientation d/t aphagia. VSS. Total cares. Using mechanical lift for transfers. Repo q2hrs. Incontinent. Purewick in place, patent, voiding. C/o BRAIN upper leg pain, R upper thigh hematoma, RICKY, ortho wrapped in compression bandage. Tylenol for pain. UTI, IV rocephin q24hrs. [place], hearing aids in room. Regular diet, tolerating well, needs assistance with ordering and feeding. R arm contracted, baseline. Baseline wheelchair bound.Na 132, NS running at 75ml/hr.hemoglobin recheck in AM. SW/CC consulted. Discharge pending, need PT assessment. Continue to monitor.

## 2021-07-10 VITALS
DIASTOLIC BLOOD PRESSURE: 50 MMHG | HEART RATE: 67 BPM | WEIGHT: 109.5 LBS | HEIGHT: 60 IN | RESPIRATION RATE: 16 BRPM | SYSTOLIC BLOOD PRESSURE: 119 MMHG | TEMPERATURE: 99.2 F | BODY MASS INDEX: 21.5 KG/M2 | OXYGEN SATURATION: 93 %

## 2021-07-10 LAB
ANION GAP SERPL CALCULATED.3IONS-SCNC: 8 MMOL/L (ref 3–14)
BUN SERPL-MCNC: 6 MG/DL (ref 7–30)
CALCIUM SERPL-MCNC: 8.3 MG/DL (ref 8.5–10.1)
CHLORIDE SERPL-SCNC: 99 MMOL/L (ref 94–109)
CO2 SERPL-SCNC: 26 MMOL/L (ref 20–32)
CREAT SERPL-MCNC: 0.42 MG/DL (ref 0.52–1.04)
ERYTHROCYTE [DISTWIDTH] IN BLOOD BY AUTOMATED COUNT: 12.5 % (ref 10–15)
ERYTHROCYTE [DISTWIDTH] IN BLOOD BY AUTOMATED COUNT: NORMAL % (ref 10–15)
GFR SERPL CREATININE-BSD FRML MDRD: 90 ML/MIN/{1.73_M2}
GLUCOSE SERPL-MCNC: 94 MG/DL (ref 70–99)
HCT VFR BLD AUTO: 30.4 % (ref 35–47)
HCT VFR BLD AUTO: NORMAL % (ref 35–47)
HGB BLD-MCNC: 10.2 G/DL (ref 11.7–15.7)
HGB BLD-MCNC: NORMAL G/DL (ref 11.7–15.7)
MCH RBC QN AUTO: 30.4 PG (ref 26.5–33)
MCH RBC QN AUTO: NORMAL PG (ref 26.5–33)
MCHC RBC AUTO-ENTMCNC: 33.6 G/DL (ref 31.5–36.5)
MCHC RBC AUTO-ENTMCNC: NORMAL G/DL (ref 31.5–36.5)
MCV RBC AUTO: 91 FL (ref 78–100)
MCV RBC AUTO: NORMAL FL (ref 78–100)
PLATELET # BLD AUTO: 335 10E9/L (ref 150–450)
PLATELET # BLD AUTO: NORMAL 10E9/L (ref 150–450)
POTASSIUM SERPL-SCNC: 4.1 MMOL/L (ref 3.4–5.3)
RBC # BLD AUTO: 3.35 10E12/L (ref 3.8–5.2)
RBC # BLD AUTO: NORMAL 10E12/L (ref 3.8–5.2)
SODIUM SERPL-SCNC: 133 MMOL/L (ref 133–144)
WBC # BLD AUTO: 13.2 10E9/L (ref 4–11)
WBC # BLD AUTO: NORMAL 10E9/L (ref 4–11)

## 2021-07-10 PROCEDURE — 85027 COMPLETE CBC AUTOMATED: CPT | Performed by: PHYSICIAN ASSISTANT

## 2021-07-10 PROCEDURE — 85018 HEMOGLOBIN: CPT | Performed by: STUDENT IN AN ORGANIZED HEALTH CARE EDUCATION/TRAINING PROGRAM

## 2021-07-10 PROCEDURE — 250N000013 HC RX MED GY IP 250 OP 250 PS 637: Performed by: INTERNAL MEDICINE

## 2021-07-10 PROCEDURE — 85027 COMPLETE CBC AUTOMATED: CPT | Performed by: STUDENT IN AN ORGANIZED HEALTH CARE EDUCATION/TRAINING PROGRAM

## 2021-07-10 PROCEDURE — 36415 COLL VENOUS BLD VENIPUNCTURE: CPT | Performed by: PHYSICIAN ASSISTANT

## 2021-07-10 PROCEDURE — 250N000011 HC RX IP 250 OP 636: Performed by: HOSPITALIST

## 2021-07-10 PROCEDURE — 36415 COLL VENOUS BLD VENIPUNCTURE: CPT | Performed by: STUDENT IN AN ORGANIZED HEALTH CARE EDUCATION/TRAINING PROGRAM

## 2021-07-10 PROCEDURE — 80048 BASIC METABOLIC PNL TOTAL CA: CPT | Performed by: PHYSICIAN ASSISTANT

## 2021-07-10 PROCEDURE — 99239 HOSP IP/OBS DSCHRG MGMT >30: CPT | Performed by: STUDENT IN AN ORGANIZED HEALTH CARE EDUCATION/TRAINING PROGRAM

## 2021-07-10 RX ORDER — AMLODIPINE BESYLATE 5 MG/1
5 TABLET ORAL DAILY
Qty: 30 TABLET | Refills: 1 | Status: SHIPPED | OUTPATIENT
Start: 2021-07-10 | End: 2021-09-03

## 2021-07-10 RX ORDER — CEPHALEXIN 500 MG/1
500 CAPSULE ORAL 4 TIMES DAILY
Qty: 8 CAPSULE | Refills: 0 | Status: SHIPPED | OUTPATIENT
Start: 2021-07-10 | End: 2021-07-12

## 2021-07-10 RX ADMIN — AMLODIPINE BESYLATE 5 MG: 5 TABLET ORAL at 09:38

## 2021-07-10 RX ADMIN — ACETAMINOPHEN 500 MG: 500 TABLET, FILM COATED ORAL at 09:40

## 2021-07-10 RX ADMIN — CEFTRIAXONE SODIUM 2 G: 2 INJECTION, POWDER, FOR SOLUTION INTRAMUSCULAR; INTRAVENOUS at 05:45

## 2021-07-10 RX ADMIN — ATENOLOL 50 MG: 50 TABLET ORAL at 09:38

## 2021-07-10 ASSESSMENT — ACTIVITIES OF DAILY LIVING (ADL)
ADLS_ACUITY_SCORE: 27
ADLS_ACUITY_SCORE: 31
ADLS_ACUITY_SCORE: 27

## 2021-07-10 NOTE — CONSULTS
Care Management Discharge Note    Discharge Date: 07/10/21  Expected Time of Departure: 7pm via w/c transport    Discharge Disposition: Home, Home Care    Discharge Services: Transportation Services    Discharge DME: None    Discharge Transportation: agency    Private pay costs discussed: transportation costs    PAS Confirmation Code:    Patient/family educated on Medicare website which has current facility and service quality ratings: yes    Education Provided on the Discharge Plan:    Persons Notified of Discharge Plans: pt, friends  Patient/Family in Agreement with the Plan: yes    Handoff Referral Completed: No    Additional Information:  Writer spoke with pt and friends. Plan is for pt to return home with roommate and roommates sister assisting. Provider ordered homecare PT/OT/RN (writer notified [facility]). Friends requested [facility] w/c transport. Ride set for 7pm (that was next available time), friends aware.    Address in chart correct. Kamini to be contact for setting up homecare appointments.    GERSON Walker

## 2021-07-10 NOTE — PROVIDER NOTIFICATION
MD Notification    Notified Person: MD    Notified Person Name: Martha    Notification Date/Time: 7/10/21 @ 1200    Notification Interaction:    Purpose of Notification: Pt has been refusing lab draws, lactic fired and she is uncooperative/tearful.    Orders Received: MD aware and says as long as Hgb is stable, we can discharge her.    Comments:

## 2021-07-10 NOTE — CONSULTS
Care Management Discharge Note    Discharge Date: 07/10/21      Discharge Disposition: Home     Discharge Services:  Home Care-Referral sent to [facility] for RN/OT/PT    Discharge DME: None    Discharge Transportation: [facility] via      Private pay costs discussed: transportation costs    PAS Confirmation Code:  N/A  Patient/family educated on Medicare website which has current facility and service quality ratings: Yes    Education Provided on the Discharge Plan:  yes  Persons Notified of Discharge Plans: Patient, Niece Joy  Patient/Family in Agreement with the Plan: yes    Handoff Referral Completed: Yes    Additional Information:  Patient discharging home today with her roommate Kamini. Home Care PT/OT/RN has been ordered. Patient has agreed to use [facility]. Referral sent via e-mail.  Writer also called Joy, patient's niece who lives in [place]. She informed that Kamini the roommate has been living with patient for over 50 years. Kamini (in her late 80's) is the POA and Health Care Agent according to Joy. Isreal lives locally and she is the sister of Kamini. She does all the grocery shopping for Steffany and Kamini and picks up their prescriptions. Kamini has always refused having Steffany go to a TCU and would rather take care of her herself. Isreal said that a Home Care Nurse at one time had done a [facility] report on Steffany but she did not know the details. Will pass this information on to the Home Care Agency via e-mail along with the referral.             Babs Lou RN  Care Coordinator

## 2021-07-10 NOTE — PLAN OF CARE
Severe expressive aphasia, unable to assess orientation. [place], hearing aids at bedside. VSS on RA. Denied pain. Regular diet, needs assistance w/ feeding. Purewick in place with good output. PIV SL w/ intermittent abx. R thigh bruising. Turn/repo q2hrs. Discharge pending hgb.

## 2021-07-10 NOTE — PLAN OF CARE
[id number]:  Pt alert, RICKY orientation but follows commands and able to read. Tylenol given prn for comfort. Hgb stable this morning, pt refused lactic draw. Provider aware. Turned/repositioned with assist of 2, incontinent of urine. Discharging with home care services. Ride set up today @ 7pm. IV removed.

## 2021-07-10 NOTE — DISCHARGE SUMMARY
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  The following labs/tests are recommended:   hgb.           Unresulted Labs Ordered in the Past 30 Days of this Admission     No orders found from 6/7/2021 to 7/8/2021.      These results will be followed up by N/A    Discharge Disposition   Discharged to home  Condition at discharge: Stable    Hospital Course   Steffany Brown is a 89-year-old female with a past medical history significant for hypertension and prior hypertensive urgency, hyperlipidemia, history of stroke with resulting right sided hemiplegia and expressive aphasia, for which she is maintained on Plavix, who presents to the Emergency Room for evaluation of reported right hip pain after a fall.  Initial x-ray imaging was negative for acute fracture, but CT reports show evidence of large hematoma with the medial thigh, and some possible hardware loosening. She developed some initial anemia with blood loss into her thigh, but this stabilized. She was discharge to home with home cares per desire of decision maker and roommate.      Right hip pain after mechanical fall  Right proximal thigh hematoma, stable  Patient initially reported to have fallen out of her wheelchair the morning of presentation.  It was later stated that her legs gave out from underneath  her and she was able to slowly lower herself to the ground.  Afterwards, she reported right leg and hip pain.  Initial x-ray imaging in the ED negative for fracture.   CT of the right femur shows large hematoma involving the musculature of the medial thigh likely involving the vastus medialis and sartorius muscle.  No evidence of fracture.  Possible loosening of femoral head neck screw.    --Orthopedic surgery consulted, input appreciated   --plan for tylenol to control pain at discharge, has only been using tylenol for >24 hours prior to discharge  --plan for home PT/OT/RN  --Discussed option of TCU at discharge but decision maker declined, opting to take her home instead     Acute blood loss anemia  Hemoglobin dropped from 15.3 down to 10.8 and was stable thereafter.  Most likely related to hematoma.  No evidence of ongoing bleeding.  No evidence of compartment syndrome.  Vital signs are stable.  --Continue to hold Plavix until 7/15/21   --Repeat hemoglobin on follow up     Hypertension with hypertensive urgency.    It was initially thought her elevated blood pressures this presentation might be due in part to pain.  However, after she received several doses of IV morphine, Valium was resting comfortably her blood pressures were still elevated.    --Continue atenolol 50 mg daily  --Added amlodipine 5 mg daily during stay    --PCP follow-up for ongoing blood pressure management.     History of stroke with right sided hemiplegia and expressive aphasia.  Her stroke occurred many years ago.  She is maintained on atenolol, Plavix and a statin.   --Blood pressure management will be continued as above.    --Hold Plavix for now, resume 7/15  --Her statin was resumed at discharge     Urinary tract infection    --Urine culture growing E. coli, resistant to penicillin, otherwise sensitive  --Received ceftriaxone while inpatient  --Discharge with 2 days Keflex QID to complete 5 days course     Hyponatremia.    Clinically  appeared consistent with hypovolemia as sodium corrected with fluids, but urine sodium was 98 with urine osmolality suggesting a component of SIADH  --Repeat sodium up to 132--136  --Encourage oral intake.        Consultations This Hospital Stay   ORTHOPEDIC SURGERY IP CONSULT  CARE MANAGEMENT / SOCIAL WORK IP CONSULT  CARE MANAGEMENT / SOCIAL WORK IP CONSULT    Code Status   No CPR- Do NOT Intubate    Time Spent on this Encounter   I, Juanito Thomas MD, personally saw the patient today and spent greater than 30 minutes discharging this patient.       Juanito Thomas MD  [place] [id number] [facility]  [address] [place] [address], [address]  [facility] [address]  Phone: [phone]  ______________________________________________________________________    Physical Exam   Vital Signs: Temp: 98  F (36.7  C) Temp src: Oral BP: (!) 143/52 Pulse: 65   Resp: 16 SpO2: 91 % O2 Device: None (Room air)    Weight: 109 lbs 8 oz  Constitutional: Elderly and frail-appearing adult female.  Alert, oriented to self, and loosely to situation only.  Cooperative, lying in bed in NAD.  Respiratory:  Lungs CTAB.  No crackles, wheezes, or rhonchi, no labored breathing.  Cardiovascular:  Heart regular rate and rhythm, no murmurs, no edema.  GI:  Abdomen soft, NT/ND and with normoactive BS  Skin/Integumen:  Warm, dry, non-diaphoretic.   MSK: CMS x4 intact.  Right thigh swollen.  Firmness under palpation of medial right thigh, extending behind the knee and to upper calf.  Remainder of limbs appear atraumatic  Neuro: moves all extremities. CN II-XII grossly intact. Expressive aphasia       Primary Care Physician   Leo Hancock    Discharge Orders      Home care nursing referral      Home Care PT Referral for Hospital Discharge      Home Care OT Referral for Hospital Discharge      Reason for your hospital stay    You were in the hospital due to a thigh hematoma.     Follow-up and recommended labs and tests     Follow up with  primary care provider, Leo Hancock, within 7 days for hospital follow- up.  The following labs/tests are recommended: hgb.     MD face to face encounter    Documentation of Face to Face and Certification for Home Health Services    I certify that patient: Steffany Brown is under my care and that I, or a nurse practitioner or physician's assistant working with me, had a face-to-face encounter that meets the physician face-to-face encounter requirements with this patient on: 7/10/2021.    This encounter with the patient was in whole, or in part, for the following medical condition, which is the primary reason for home health care: s/p hip replacement complicated by thigh hematoma, hx of stroke with plegia.    I certify that, based on my findings, the following services are medically necessary home health services: Nursing and Physical Therapy.    My clinical findings support the need for the above services because: Nurse is needed: To assess thigh hematoma, home safety after changes in medications or other medical regimen.. and Physical Therapy Services are needed to assess and treat the following functional impairments: deconditioning weakness. Occupational Therapy Services are needed to assess and treat functional impairments in self care and transfers.    Further, I certify that my clinical findings support that this patient is homebound (i.e. absences from home require considerable and taxing effort and are for medical reasons or [facility] services or infrequently or of short duration when for other reasons) because: Requires assistance of another person or specialized equipment to access medical services because patient: Range of motion limitations prevents ability to exit home safely. and Requires supervision of another for safe transfer...    Based on the above findings. I certify that this patient is confined to the home and needs intermittent skilled nursing care, physical therapy and/or speech  therapy.  The patient is under my care, and I have initiated the establishment of the plan of care.  This patient will be followed by a physician who will periodically review the plan of care.  Physician/Provider to provide follow up care: Leo Hancock    Attending hospital physician (the Medicare certified [place] provider): Juanito Thomas MD  Physician Signature: See electronic signature associated with these discharge orders.  Date: 7/10/2021     Activity    Your activity upon discharge: activity as tolerated     Discharge Instructions    1. Take the antibiotic Keflex starting on 7/11/21 in the morning. You will have to take this medication for two days. You will take it four times daily (with breakfast, lunch, dinner, and before bed).     2. We added a medication to control your blood pressure better. Take Amlodipine once daily.    3. You should not take your Plavix (clopidogrel) until 7/15/21.     4. You should take tylenol as needed to control your leg pain.     Diet    Follow this diet upon discharge: Orders Placed This Encounter      Regular Diet Adult       Significant Results and Procedures   Most Recent 3 CBC's:  Recent Labs   Lab Test 07/10/21  1055 07/10/21  0820 07/09/21  1528 07/09/21  0622   WBC 13.2* Canceled, Test credited  --  12.0*   HGB 10.2* Canceled, Test credited 10.4* 9.9*   MCV 91 Canceled, Test credited  --  92    Canceled, Test credited  --  299     Most Recent 3 BMP's:  Recent Labs   Lab Test 07/10/21  0820 07/09/21  0622 07/08/21  0621    136 132*   POTASSIUM 4.1 4.1 4.1   CHLORIDE 99 106 101   CO2 26 29 26   BUN 6* 9 12   CR 0.42* 0.55 0.70   ANIONGAP 8 1* 5   NICA 8.3* 8.3* 8.6   GLC 94 89 94   ,   Results for orders placed or performed during the hospital encounter of 07/07/21   XR Pelvis w Hip Right 1 View    Narrative    Examination:  XR PELVIS AND HIP RIGHT 1 VIEW    Date:  7/7/2021 11:25 AM     Clinical Information: Leg pain after trauma.    Comparison:  Pelvis CT 10/25/2018..      Impression    Impression:    1.  No acute fracture identified in the right hip or femur. An old  intratrochanteric fracture of the proximal right femur is status post  ORIF with an IM nail and a proximal cephalometric/femoral neck screw.  The fracture appears healed. No evidence of hardware complication.  Position/alignment of the hardware is unchanged. There is a moderate  amount of cystic lucency around the threads of the femoral neck screw,  in the femoral head, suggesting some amount of loosening. No femoral  head fracture is visualized. Distal interlocking screws are well  seated.    2.  The right hip joint is normally aligned with maintained joint  spacing. Mild tricompartmental degenerative changes are present in the  right knee.    3.  There has also been prior intratrochanteric fracture of the left  femur, which is also status post ORIF. These fractures are  well-healed. Separately, the femoral head appears chronically  collapsed, with exposure of the tip of the screw to the joint space.  The acetabulum is also remodeled, such that the femoral head is  partially subluxed superiorly. This does not appear acute, although  these findings are new since October 2018.    YAS RILEY MD         SYSTEM ID:  [id number]   XR Knee Right 1/2 Views    Narrative    Examination:  XR KNEE RT 1 /2 VW    Date:  7/7/2021 11:25 AM     Clinical Information: Knee pain after trauma.    Comparison: none.      Impression    Impression:    1.  Right knee negative for fracture or joint malalignment. Moderate  tricompartmental degenerative arthritic changes in the knee, with  joint space narrowing, subchondral sclerosis, and osteophytes,  greatest in the lateral compartment. Small knee joint effusion.    2.  Intramedullary nail in the distal femur, with interlocking screws.    YAS RILEY MD         SYSTEM ID:  [id number]   CT Femur Thigh Right w/o Contrast    Narrative    CT FEMUR THIGH  RIGHT WITHOUT CONTRAST July 7, 2021 1:45 PM     HISTORY: Right hip pain.    TECHNIQUE: Axial scans were performed through the right hip and femur  to include the tip of the femoral gemma. Radiation dose for this scan  was reduced using automated exposure control, adjustment of the mA  and/or kV according to patient size, or iterative reconstruction  technique.    COMPARISON: X-rays from today.    FINDINGS: Healed right intertrochanteric fracture. Moderate cystic  lucency around the tip of the screw extending in the femoral head and  neck. This suggests resorption and/or some loosening. There is a  persistently displaced lesser trochanter fracture fragment. No lucency  surrounding the femoral gemma or distal cross fixation screws. No  evidence of acute fracture in the right hip or femur. A dynamic  compression screw extending into the femoral head and neck does  protrude approximately 1.6 cm into the soft tissues lateral to the  inferior greater trochanter.    Very prominent swelling of the medial and posterior medial thigh  musculature likely related to the vastus medialis and likely sartorius  musculature. This is likely related to a large hematoma.    Moderate Baker's cyst and likely some fluid extends in the pes  anserine bursa.      Impression    IMPRESSION:  1. Large hematoma involving the musculature of the medial thigh likely  involving the vastus medialis and sartorius muscle.  2. No evidence of acute fracture  3. Healed right intertrochanteric fracture. There are some resorptive  changes around the tip of the femoral head neck screw that could be  related to loosening. The proximal end of the screw extends into the  soft tissues of the lateral proximal thigh adjacent to the greater  trochanter.  4. Moderate Baker's cyst and fluid in the pes anserine bursa.    AVANI SANDOVAL MD         SYSTEM ID:  [id number]       Discharge Medications   Current Discharge Medication List      START taking these medications     Details   amLODIPine (NORVASC) 5 MG tablet Take 1 tablet (5 mg) by mouth daily  Qty: 30 tablet, Refills: 1    Comments: Future refills by PCP Dr. Leo Hancock with phone number [phone].  Associated Diagnoses: Benign essential hypertension      cephALEXin (KEFLEX) 500 MG capsule Take 1 capsule (500 mg) by mouth 4 times daily for 2 days  Qty: 8 capsule, Refills: 0    Associated Diagnoses: Urinary tract infection without hematuria, site unspecified         CONTINUE these medications which have NOT CHANGED    Details   acetaminophen (TYLENOL) 500 MG tablet Take 500-1,000 mg by mouth every 6 hours as needed       atenolol (TENORMIN) 50 MG tablet Take 1 tablet (50 mg) by mouth daily  Qty: 90 tablet, Refills: 3    Comments: PROFILE FOR FUTURE REFILLS UNTIL PATIENT CALLS FOR REFILLS  Associated Diagnoses: Benign essential hypertension      clopidogrel (PLAVIX) 75 MG tablet Take 1 tablet (75 mg) by mouth daily  Qty: 90 tablet, Refills: 3    Comments: PROFILE FOR FUTURE REFILLS UNTIL PATIENT CALLS FOR REFILLS  Associated Diagnoses: 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)      Multiple Vitamin (MULTIVITAMIN ADULT PO) Take 1 tablet by mouth daily      omeprazole (PRILOSEC) 40 MG DR capsule Take 1 capsule (40 mg) by mouth every morning (before breakfast)  Qty: 30 capsule, Refills: 1    Associated Diagnoses: Acute chest pain      lovastatin (MEVACOR) 40 MG tablet Take 1 tablet (40 mg) by mouth At Bedtime  Qty: 90 tablet, Refills: 1    Associated Diagnoses: Hyperlipidemia LDL goal <100           Allergies   Allergies   Allergen Reactions     Lipitor [Atorvastatin Calcium] Cramps     Leg cramps     Lisinopril Swelling and Rash     Seasonal Allergies

## 2021-07-12 ENCOUNTER — PATIENT OUTREACH (OUTPATIENT)
Dept: CARE COORDINATION | Facility: CLINIC | Age: 86
End: 2021-07-12

## 2021-07-12 DIAGNOSIS — Z71.89 OTHER SPECIFIED COUNSELING: ICD-10-CM
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
## 2021-07-14 ENCOUNTER — MEDICAL CORRESPONDENCE (OUTPATIENT)
Dept: HEALTH INFORMATION MANAGEMENT | Facility: CLINIC | Age: 86
End: 2021-07-14

## 2021-07-14 ENCOUNTER — TELEPHONE (OUTPATIENT)
Dept: INTERNAL MEDICINE | Facility: CLINIC | Age: 86
End: 2021-07-14

## 2021-07-14 NOTE — TELEPHONE ENCOUNTER
[facility] care RN Amanda called requesting verbal approval for the following orders:    SN 2 x a wk for 1 wk, 1 x a wk for 7wks  PT & OT to evaluate and tx  SW evaluation.    Please advice on approval of orders. Ok to leave a detailed voice message at [phone].

## 2021-07-19 ENCOUNTER — TELEPHONE (OUTPATIENT)
Dept: INTERNAL MEDICINE | Facility: CLINIC | Age: 86
End: 2021-07-19

## 2021-07-19 NOTE — TELEPHONE ENCOUNTER
[place] with [facility] -  [phone]    Called requesting orders for    PT  Once a week for one week  Twice a week for two weeks  Once a week for one week    Verbal Order given    Kay English, MSN, RN

## 2021-07-19 NOTE — TELEPHONE ENCOUNTER
[facility] Ot calling for orders.  OT 1W4W work on shower and toilet transfers and equipment as needed.    Kaitlin [phone].    Verbal orders for homecare given for above and per protocol.    Daya VILA RN  EP Triage

## 2021-07-21 DIAGNOSIS — Z53.9 DIAGNOSIS NOT YET DEFINED: Primary | ICD-10-CM

## 2021-07-21 PROCEDURE — G0180 MD CERTIFICATION HHA PATIENT: HCPCS | Performed by: INTERNAL MEDICINE

## 2021-09-02 ENCOUNTER — TELEPHONE (OUTPATIENT)
Dept: INTERNAL MEDICINE | Facility: CLINIC | Age: 86
End: 2021-09-02

## 2021-09-02 NOTE — TELEPHONE ENCOUNTER
Dr. Hancock,     The plan was discharge her from home care- patient has not been taking her medications as prescribed- it doesn't appear that she has been taking them the past few weeks.     They are worried the caregiver ( Kamini/partner/roomate) is confused, they ( RN/outside family)  have involved  and they are thinking a DA might be needed.     RN- request extension nursing visit medication management 2 a week for 1 week. Verbal orders approved.   B/P 138/72 this week, last week 138/70.         Patient has an appointment on 9/3/21 @ 0940 am.     Can we leave a detailed message on this number? YES  Phone number patient can be reached at: Other phone number:  [facility] - [phone]    Verna Cesar RN  [facility] Triage

## 2021-09-02 NOTE — TELEPHONE ENCOUNTER
OK for the orders.     We will review her issues at the time she is seen and I will try to investigate as best possible.     Close encounter when done.

## 2021-09-03 ENCOUNTER — OFFICE VISIT (OUTPATIENT)
Dept: INTERNAL MEDICINE | Facility: CLINIC | Age: 86
End: 2021-09-03
Payer: MEDICARE

## 2021-09-03 DIAGNOSIS — E78.5 HYPERLIPIDEMIA LDL GOAL <100: ICD-10-CM

## 2021-09-03 DIAGNOSIS — I69.920 APHASIA, LATE EFFECT OF CEREBROVASCULAR DISEASE: ICD-10-CM

## 2021-09-03 DIAGNOSIS — E46 PROTEIN-CALORIE MALNUTRITION, UNSPECIFIED SEVERITY (H): ICD-10-CM

## 2021-09-03 DIAGNOSIS — I69.959 HEMIPLEGIA OF DOMINANT SIDE AS LATE EFFECT FOLLOWING CEREBROVASCULAR DISEASE (H): ICD-10-CM

## 2021-09-03 DIAGNOSIS — E22.2 SIADH (SYNDROME OF INAPPROPRIATE ADH PRODUCTION) (H): ICD-10-CM

## 2021-09-03 DIAGNOSIS — I10 BENIGN ESSENTIAL HYPERTENSION: Primary | ICD-10-CM

## 2021-09-03 LAB
ANION GAP SERPL CALCULATED.3IONS-SCNC: 3 MMOL/L (ref 3–14)
BUN SERPL-MCNC: 8 MG/DL (ref 7–30)
CALCIUM SERPL-MCNC: 10 MG/DL (ref 8.5–10.1)
CHLORIDE BLD-SCNC: 97 MMOL/L (ref 94–109)
CO2 SERPL-SCNC: 28 MMOL/L (ref 20–32)
CREAT SERPL-MCNC: 0.54 MG/DL (ref 0.52–1.04)
GFR SERPL CREATININE-BSD FRML MDRD: 83 ML/MIN/1.73M2
GLUCOSE BLD-MCNC: 93 MG/DL (ref 70–99)
HGB BLD-MCNC: 16 G/DL (ref 11.7–15.7)
POTASSIUM BLD-SCNC: 4.2 MMOL/L (ref 3.4–5.3)
SODIUM SERPL-SCNC: 128 MMOL/L (ref 133–144)

## 2021-09-03 PROCEDURE — 36415 COLL VENOUS BLD VENIPUNCTURE: CPT | Performed by: INTERNAL MEDICINE

## 2021-09-03 PROCEDURE — 80048 BASIC METABOLIC PNL TOTAL CA: CPT | Performed by: INTERNAL MEDICINE

## 2021-09-03 PROCEDURE — 85018 HEMOGLOBIN: CPT | Performed by: INTERNAL MEDICINE

## 2021-09-03 PROCEDURE — 99214 OFFICE O/P EST MOD 30 MIN: CPT | Performed by: INTERNAL MEDICINE

## 2021-09-03 RX ORDER — LOVASTATIN 40 MG
40 TABLET ORAL AT BEDTIME
Qty: 90 TABLET | Refills: 1 | Status: SHIPPED | OUTPATIENT
Start: 2021-09-03

## 2021-09-03 RX ORDER — AMLODIPINE BESYLATE 5 MG/1
5 TABLET ORAL EVERY EVENING
Qty: 90 TABLET | Refills: 1 | Status: SHIPPED | OUTPATIENT
Start: 2021-09-03

## 2021-09-03 RX ORDER — CLOPIDOGREL BISULFATE 75 MG/1
75 TABLET ORAL DAILY
Qty: 90 TABLET | Refills: 3 | Status: SHIPPED | OUTPATIENT
Start: 2021-09-03

## 2021-09-03 RX ORDER — ATENOLOL 50 MG/1
50 TABLET ORAL EVERY MORNING
Qty: 90 TABLET | Refills: 1 | Status: SHIPPED | OUTPATIENT
Start: 2021-09-03

## 2021-09-03 RX ORDER — LOVASTATIN 40 MG
40 TABLET ORAL AT BEDTIME
Qty: 90 TABLET | Refills: 1 | Status: CANCELLED | OUTPATIENT
Start: 2021-09-03

## 2021-09-03 RX ORDER — AMLODIPINE BESYLATE 5 MG/1
5 TABLET ORAL DAILY
Qty: 30 TABLET | Refills: 1 | Status: CANCELLED | OUTPATIENT
Start: 2021-09-03

## 2021-09-03 NOTE — PROGRESS NOTES
Assessment & Plan     (I10) Benign essential hypertension  (primary encounter diagnosis)  Comment:   *Blood pressure slightly elevated, but she slightly agitated today.  She has not yet taken her blood pressure medicines yet today.    *  Rechecking labs today.     *  Continue all medications at the same doses.  Contact your usual pharmacy if you need refills.     *  Take Amlodipine 5 mg once per day in the evening.     *  Take Atenolol 50 mg once per day in the  Morning.     *  Take the Clopidogrel once per day (anytime is OK)    *  Take Lovastatin once per day (take in evening.     *  Return to see me in 3 months.  Use [employer] or Call [phone] to schedule the appointment with me.       Plan: Hemoglobin, Basic metabolic panel, amLODIPine         (NORVASC) 5 MG tablet, atenolol (TENORMIN) 50         MG tablet            (E78.5) Hyperlipidemia LDL goal <100  Comment: Discussed guidelines recommending a statin cholesterol lowering medication for any patient with either diabetes and/or vascular disease, aiming for a LDL goal under 100 for sure, ideally under 70, using whatever dose of statin tolerated.    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  Her baseline sodium between 128 and 130.  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  Discussed secondary risk factor modification and recommended continuing aggressive management of these items.   Plan:     (I69.920) Aphasia, late effect of cerebrovascular disease  Comment: Stable, no new changes in symptoms.  Discussed secondary risk factor modification and recommended continuing aggressive management of these items.   Plan:     (E46) Protein-calorie malnutrition, unspecified severity (H)  Comment: Weight has been  decreasing.  Concerned about her ability for eating, especially the increasing difficulties of independent living between her and her roommate, who is experiencing memory problems on her own.  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   Plan: clopidogrel (PLAVIX) 75 MG tablet            **Provide care coordination referral on behalf of the roommate is having increasing memory problems.  The family is very closely involved and are concerned.  I spoke with the sister of the rommate (Kamini;s sister) and agreed with their concerns about safety.  They have begun to consider assisted-living options.                    Return in about 3 months (around 12/3/2021) for Blood pressure.    Leo Hancock MD  [facility]   Steffany is a 89 year old who presents for the following health issues  accompanied by her friend and sister of her roommate:    HPI     Hypertension Follow-up      Do you check your blood pressure regularly outside of the clinic? Yes     Are you following a low salt diet? Yes    Are your blood pressures ever more than 140 on the top number (systolic) OR more   than 90 on the bottom number (diastolic), for example 140/90? Unknown  Hadn't been getting her medications    2.  History of previous stroke with aphasia.  Much of the history is advised by her remains in her mid sister.  No new neurological deficits.  Patient is able to pivot transfer relies on wheelchair for most transportation.    She is completely reliant on her roommate for almost all ADLs.    3.  Significant concern about the safety of her living condition, her roommate is noted to have increasing memory problems, with difficulty complying with her own medical regimen and helping Steffany manage her medications.    4.   History of SIADH.  Repeat labs.        Review of Systems   Constitutional, HEENT, cardiovascular, pulmonary, gi and gu systems are negative, except as otherwise noted.      Objective    BP (!) 160/70   Pulse 76   Ht 1.524 m (5')   LMP  (LMP Unknown)   SpO2 95%   Breastfeeding No   BMI 21.39 kg/m    Body mass index is 21.39 kg/m .  Physical Exam   GENERAL alert and no distress  EYES:  Normal sclera,conjunctiva, EOMI  HENT: oral and posterior pharynx without lesions or erythema, facies symmetric  NECK: Neck supple. No LAD, without thyroidmegaly.  RESP: Clear to ausculation bilaterally without wheezes or crackles. Normal BS in all fields.  CV: RRR normal S1S2 without murmurs, rubs or gallops.  LYMPH: no cervical lymph adenopathy appreciated  MS: extremities- no gross deformities of the visible extremities noted,   EXT:  no lower extremity edema  PSYCH: Alert and oriented times 3; speech- coherent  SKIN:  No obvious significant skin lesions on visible portions of face     Results for orders placed or performed in visit on 09/03/21   Hemoglobin     Status: Abnormal   Result Value Ref Range    Hemoglobin 16.0 (H) 11.7 - 15.7 g/dL   Basic metabolic panel     Status: Abnormal   Result Value Ref Range    Sodium 128 (L) 133 - 144 mmol/L    Potassium 4.2 3.4 - 5.3 mmol/L    Chloride 97 94 - 109 mmol/L    Carbon Dioxide (CO2) 28 20 - 32 mmol/L    Anion Gap 3 3 - 14 mmol/L    Urea Nitrogen 8 7 - 30 mg/dL    Creatinine 0.54 0.52 - 1.04 mg/dL    Calcium 10.0 8.5 - 10.1 mg/dL    Glucose 93 70 - 99 mg/dL    GFR Estimate 83 >60 mL/min/1.73m2

## 2021-09-03 NOTE — PATIENT INSTRUCTIONS
*  Rechecking labs today.     *  Continue all medications at the same doses.  Contact your usual pharmacy if you need refills.     *  Take Amlodipine 5 mg once per day in the evening.     *  Take Atenolol 50 mg once per day in the  Morning.     *  Take the Clopidogrel once per day (anytime is OK)    *  Take Lovastatin once per day (take in evening.     *  Return to see me in 3 months.  Use [employer] or Call [phone] to schedule the appointment with me.

## 2021-09-05 ENCOUNTER — HEALTH MAINTENANCE LETTER (OUTPATIENT)
Age: 86
End: 2021-09-05

## 2021-09-10 ENCOUNTER — TELEPHONE (OUTPATIENT)
Dept: INTERNAL MEDICINE | Facility: CLINIC | Age: 86
End: 2021-09-10

## 2021-09-10 NOTE — TELEPHONE ENCOUNTER
Call from Home Care RN requested HC orders:     SN - 1x/3 weeks     [facility] [phone]    Verbal given     Lilian DIEZ RN     Detail Level: Detailed Detail Level: Simple

## 2021-09-11 VITALS
OXYGEN SATURATION: 95 % | DIASTOLIC BLOOD PRESSURE: 70 MMHG | SYSTOLIC BLOOD PRESSURE: 160 MMHG | BODY MASS INDEX: 21.39 KG/M2 | HEIGHT: 60 IN | HEART RATE: 76 BPM

## 2021-09-24 DIAGNOSIS — Z53.9 DIAGNOSIS NOT YET DEFINED: Primary | ICD-10-CM

## 2021-09-24 PROCEDURE — G0179 MD RECERTIFICATION HHA PT: HCPCS | Performed by: INTERNAL MEDICINE

## 2021-09-27 ENCOUNTER — TELEPHONE (OUTPATIENT)
Dept: INTERNAL MEDICINE | Facility: CLINIC | Age: 86
End: 2021-09-27

## 2021-09-27 NOTE — TELEPHONE ENCOUNTER
Dr. Hancock,     Skilled nurse visit 1x a week for 5 weeks.     Verbal given.     Can we leave a detailed message on this number? YES  Phone number patient can be reached at: Other phone number:  [facility]- [phone]    Verna Cesar, RN  [facility] Triage

## 2021-12-07 ENCOUNTER — OFFICE VISIT (OUTPATIENT)
Dept: INTERNAL MEDICINE | Facility: CLINIC | Age: 86
End: 2021-12-07
Payer: MEDICARE

## 2021-12-07 DIAGNOSIS — Z23 NEED FOR PROPHYLACTIC VACCINATION AND INOCULATION AGAINST INFLUENZA: ICD-10-CM

## 2021-12-07 DIAGNOSIS — E87.1 HYPONATREMIA: ICD-10-CM

## 2021-12-07 DIAGNOSIS — I69.959 HEMIPLEGIA OF DOMINANT SIDE AS LATE EFFECT FOLLOWING CEREBROVASCULAR DISEASE (H): ICD-10-CM

## 2021-12-07 DIAGNOSIS — Z23 HIGH PRIORITY FOR 2019-NCOV VACCINE: ICD-10-CM

## 2021-12-07 DIAGNOSIS — E22.2 SIADH (SYNDROME OF INAPPROPRIATE ADH PRODUCTION) (H): ICD-10-CM

## 2021-12-07 DIAGNOSIS — I69.920 APHASIA, LATE EFFECT OF CEREBROVASCULAR DISEASE: ICD-10-CM

## 2021-12-07 DIAGNOSIS — Z91.81 AT HIGH RISK FOR INJURY RELATED TO FALL: ICD-10-CM

## 2021-12-07 DIAGNOSIS — I10 BENIGN ESSENTIAL HYPERTENSION: Primary | ICD-10-CM

## 2021-12-07 PROCEDURE — 90662 IIV NO PRSV INCREASED AG IM: CPT | Performed by: INTERNAL MEDICINE

## 2021-12-07 PROCEDURE — 99214 OFFICE O/P EST MOD 30 MIN: CPT | Mod: 25 | Performed by: INTERNAL MEDICINE

## 2021-12-07 PROCEDURE — 0004A COVID-19,PF,PFIZER (12+ YRS): CPT | Performed by: INTERNAL MEDICINE

## 2021-12-07 PROCEDURE — 91300 COVID-19,PF,PFIZER (12+ YRS): CPT | Performed by: INTERNAL MEDICINE

## 2021-12-07 PROCEDURE — G0008 ADMIN INFLUENZA VIRUS VAC: HCPCS | Performed by: INTERNAL MEDICINE

## 2021-12-07 NOTE — PROGRESS NOTES
Assessment & Plan     (I10) Benign essential hypertension  (primary encounter diagnosis)  Comment: This condition is currently controlled on the current medical regimen.  Continue current therapy.   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  Continue current therapy.   No new neurological symptoms.  Discussed secondary risk factor modification and recommended continuing aggressive management of these items.   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  Continue current therapy.   Patient does not drink much fluid, she is effectively on a fluid restriction which is controlled her levels.  Plan:     (E87.1) Hyponatremia  Comment:   Plan:     (I69.920) Aphasia, late effect of cerebrovascular disease  Comment: This condition is currently controlled on the current medical regimen.  Continue current therapy.   Plan:     (Z91.81) At high risk for injury related to fall  Comment: Mostly wheelchair dependent, able to perform pivot transfers.  Plan:     (Z23) Need for prophylactic vaccination and inoculation against influenza  Comment:   Plan: INFLUENZA, QUAD, HIGH DOSE, PF, 65YR + (FLUZONE        HD)            (Z23) High priority for 2019-nCoV vaccine  Comment:   Plan: COVID-19,PF,PFIZER (12+ Yrs PURPLE LABEL)                            Return in about 3 months (around 3/7/2022).    Leo Hancock MD  [facility]    Kee Jeter is a 89 year old who presents for the following health issues  accompanied by her friend (roommate Kamini), and body sister.    HPI     Hypertension Follow-up      Do you check your blood pressure regularly outside of the clinic? No     Are you following a low salt diet? No    Are your blood pressures  ever more than 140 on the top number (systolic) OR more   than 90 on the bottom number (diastolic), for example 140/90? N/a       How many servings of fruits and vegetables do you eat daily?  4 or more    On average, how many sweetened beverages do you drink each day (Examples: soda, juice, sweet tea, etc.  Do NOT count diet or artificially sweetened beverages)?   0    How many days per week do you exercise enough to make your heart beat faster? 3 or less    How many minutes a day do you exercise enough to make your heart beat faster? 9 or less    How many days per week do you miss taking your medication? 0    2.  History of prior strokes resulting in hemiplegia and aphasia.  She is chronic one-sided weakness that greatly limits her ability to ambulate, she is able to perform pivot transfers but uses a wheelchair for any other transportation.  She has near completely reliant on her roommate for assistance in activities of daily living (ADLs).    3.  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  Currently stable.  Laboratory studies reviewed.    Component      Latest Ref Rng & Units 5/12/2021 7/8/2021 7/9/2021 7/10/2021   Sodium      133 - 144 mmol/L 134 132 (L) 136 133   Potassium      3.4 - 5.3 mmol/L 4.0 4.1 4.1 4.1   Chloride      94 - 109 mmol/L 103 101 106 99   Carbon Dioxide      20 - 32 mmol/L 28 26 29 26   Anion Gap      3 - 14 mmol/L 3 5 1 (L) 8   Glucose      70 - 99 mg/dL 91 94 89 94   Urea Nitrogen      7 - 30 mg/dL 16 12 9 6 (L)   Creatinine      0.52 - 1.04 mg/dL 0.70 0.70 0.55 0.42 (L)   GFR Estimate      >60 mL/min/1.73m2 76 75 82 90   GFR Estimate If Black      >60 mL/min/1.73:m2 88 87 >90 >90   Calcium      8.5 - 10.1 mg/dL 8.6 8.6 8.3 (L) 8.3 (L)     Component      Latest Ref Rng & Units 9/3/2021   Sodium      133 - 144 mmol/L 128 (L)   Potassium      3.4 - 5.3 mmol/L 4.2   Chloride      94 - 109 mmol/L 97   Carbon Dioxide      20 - 32 mmol/L 28   Anion Gap      3 - 14 mmol/L 3   Glucose       70 - 99 mg/dL 93   Urea Nitrogen      7 - 30 mg/dL 8   Creatinine      0.52 - 1.04 mg/dL 0.54   GFR Estimate      >60 mL/min/1.73m2 83   GFR Estimate If Black      >60 mL/min/1.73:m2    Calcium      8.5 - 10.1 mg/dL 10.0       **I reviewed the information recorded in the patient's EPIC chart (including but not limited to medical history, surgical history, family history, problem list, medication list, and allergy list) and updated the information as indicated based on the patients reported information.         Review of Systems   Constitutional, HEENT, cardiovascular, pulmonary, gi and gu systems are negative, except as otherwise noted.      Objective    /68   Pulse 63   Temp 97.4  F (36.3  C) (Temporal)   Resp 14   LMP  (LMP Unknown)   SpO2 97%   There is no height or weight on file to calculate BMI.  Physical Exam   GENERAL alert and no distress,   EYES:  Normal sclera,conjunctiva, EOMI  HENT: facies symmetric  MS: extremities- no gross deformities of the visible extremities noted,   PSYCH: Alert and oriented times 3; speech-limited due to aphasia, she can communicate in very short (few words) sentences.  SKIN:  No obvious significant skin lesions on visible portions of face   NEURO:  Normal facial movements.  Appears to move normally

## 2021-12-07 NOTE — PATIENT INSTRUCTIONS
*  Continue all medications at the same doses.  Contact your usual pharmacy if you need refills.     *  Return to see me in 3 months.  Use [employer] or Call [phone] to schedule the appointment with me.

## 2021-12-13 VITALS
RESPIRATION RATE: 14 BRPM | TEMPERATURE: 97.4 F | SYSTOLIC BLOOD PRESSURE: 138 MMHG | OXYGEN SATURATION: 97 % | HEART RATE: 63 BPM | DIASTOLIC BLOOD PRESSURE: 68 MMHG

## 2022-01-01 ENCOUNTER — HEALTH MAINTENANCE LETTER (OUTPATIENT)
Age: 87
End: 2022-01-01

## 2022-02-01 ENCOUNTER — HOSPITAL ENCOUNTER (OUTPATIENT)
Facility: CLINIC | Age: 87
Setting detail: OBSERVATION
Discharge: NURSING, REHAB OR LONG-TERM CARE | End: 2022-02-07
Attending: PHYSICIAN ASSISTANT | Admitting: HOSPITALIST
Payer: MEDICARE

## 2022-02-01 ENCOUNTER — TELEPHONE (OUTPATIENT)
Dept: INTERNAL MEDICINE | Facility: CLINIC | Age: 87
End: 2022-02-01
Payer: MEDICARE

## 2022-02-01 ENCOUNTER — APPOINTMENT (OUTPATIENT)
Dept: CT IMAGING | Facility: CLINIC | Age: 87
End: 2022-02-01
Attending: PHYSICIAN ASSISTANT
Payer: MEDICARE

## 2022-02-01 ENCOUNTER — APPOINTMENT (OUTPATIENT)
Dept: GENERAL RADIOLOGY | Facility: CLINIC | Age: 87
End: 2022-02-01
Attending: PHYSICIAN ASSISTANT
Payer: MEDICARE

## 2022-02-01 DIAGNOSIS — Z86.73 HISTORY OF CVA (CEREBROVASCULAR ACCIDENT): ICD-10-CM

## 2022-02-01 DIAGNOSIS — D72.829 LEUKOCYTOSIS: ICD-10-CM

## 2022-02-01 DIAGNOSIS — M25.511 ACUTE PAIN OF RIGHT SHOULDER: ICD-10-CM

## 2022-02-01 DIAGNOSIS — S80.11XA HEMATOMA OF RIGHT LOWER EXTREMITY, INITIAL ENCOUNTER: Primary | ICD-10-CM

## 2022-02-01 DIAGNOSIS — R47.01 APHASIA: ICD-10-CM

## 2022-02-01 LAB
ALBUMIN SERPL-MCNC: 3.6 G/DL (ref 3.4–5)
ALP SERPL-CCNC: 95 U/L (ref 40–150)
ALT SERPL W P-5'-P-CCNC: 16 U/L (ref 0–50)
ANION GAP SERPL CALCULATED.3IONS-SCNC: 6 MMOL/L (ref 3–14)
AST SERPL W P-5'-P-CCNC: 17 U/L (ref 0–45)
ATRIAL RATE - MUSE: 66 BPM
BASOPHILS # BLD AUTO: 0.1 10E3/UL (ref 0–0.2)
BASOPHILS NFR BLD AUTO: 1 %
BILIRUB SERPL-MCNC: 0.4 MG/DL (ref 0.2–1.3)
BUN SERPL-MCNC: 11 MG/DL (ref 7–30)
CALCIUM SERPL-MCNC: 9.5 MG/DL (ref 8.5–10.1)
CHLORIDE BLD-SCNC: 103 MMOL/L (ref 94–109)
CO2 SERPL-SCNC: 26 MMOL/L (ref 20–32)
CREAT SERPL-MCNC: 0.49 MG/DL (ref 0.52–1.04)
D DIMER PPP FEU-MCNC: 1.04 UG/ML FEU (ref 0–0.5)
DIASTOLIC BLOOD PRESSURE - MUSE: NORMAL MMHG
EOSINOPHIL # BLD AUTO: 0.1 10E3/UL (ref 0–0.7)
EOSINOPHIL NFR BLD AUTO: 1 %
ERYTHROCYTE [DISTWIDTH] IN BLOOD BY AUTOMATED COUNT: 11.7 % (ref 10–15)
FLUAV RNA SPEC QL NAA+PROBE: NEGATIVE
FLUBV RNA RESP QL NAA+PROBE: NEGATIVE
GFR SERPL CREATININE-BSD FRML MDRD: 88 ML/MIN/1.73M2
GLUCOSE BLD-MCNC: 124 MG/DL (ref 70–99)
HCT VFR BLD AUTO: 48.5 % (ref 35–47)
HGB BLD-MCNC: 15.9 G/DL (ref 11.7–15.7)
HOLD SPECIMEN: NORMAL
IMM GRANULOCYTES # BLD: 0.1 10E3/UL
IMM GRANULOCYTES NFR BLD: 1 %
INTERPRETATION ECG - MUSE: NORMAL
LIPASE SERPL-CCNC: 123 U/L (ref 73–393)
LYMPHOCYTES # BLD AUTO: 1.7 10E3/UL (ref 0.8–5.3)
LYMPHOCYTES NFR BLD AUTO: 11 %
MCH RBC QN AUTO: 30.2 PG (ref 26.5–33)
MCHC RBC AUTO-ENTMCNC: 32.8 G/DL (ref 31.5–36.5)
MCV RBC AUTO: 92 FL (ref 78–100)
MONOCYTES # BLD AUTO: 1.7 10E3/UL (ref 0–1.3)
MONOCYTES NFR BLD AUTO: 11 %
NEUTROPHILS # BLD AUTO: 11.6 10E3/UL (ref 1.6–8.3)
NEUTROPHILS NFR BLD AUTO: 75 %
NRBC # BLD AUTO: 0 10E3/UL
NRBC BLD AUTO-RTO: 0 /100
P AXIS - MUSE: 53 DEGREES
PLATELET # BLD AUTO: 365 10E3/UL (ref 150–450)
POTASSIUM BLD-SCNC: 3.8 MMOL/L (ref 3.4–5.3)
PR INTERVAL - MUSE: 184 MS
PROT SERPL-MCNC: 8.2 G/DL (ref 6.8–8.8)
QRS DURATION - MUSE: 134 MS
QT - MUSE: 442 MS
QTC - MUSE: 463 MS
R AXIS - MUSE: -56 DEGREES
RBC # BLD AUTO: 5.27 10E6/UL (ref 3.8–5.2)
SARS-COV-2 RNA RESP QL NAA+PROBE: NEGATIVE
SODIUM SERPL-SCNC: 135 MMOL/L (ref 133–144)
SYSTOLIC BLOOD PRESSURE - MUSE: NORMAL MMHG
T AXIS - MUSE: 105 DEGREES
TROPONIN I SERPL HS-MCNC: 15 NG/L
TROPONIN I SERPL HS-MCNC: 16 NG/L
VENTRICULAR RATE- MUSE: 66 BPM
WBC # BLD AUTO: 15.4 10E3/UL (ref 4–11)

## 2022-02-01 PROCEDURE — 250N000013 HC RX MED GY IP 250 OP 250 PS 637: Performed by: HOSPITALIST

## 2022-02-01 PROCEDURE — 87636 SARSCOV2 & INF A&B AMP PRB: CPT | Performed by: PHYSICIAN ASSISTANT

## 2022-02-01 PROCEDURE — 73060 X-RAY EXAM OF HUMERUS: CPT | Mod: RT

## 2022-02-01 PROCEDURE — 82435 ASSAY OF BLOOD CHLORIDE: CPT | Performed by: PHYSICIAN ASSISTANT

## 2022-02-01 PROCEDURE — 71275 CT ANGIOGRAPHY CHEST: CPT | Mod: ME

## 2022-02-01 PROCEDURE — C9803 HOPD COVID-19 SPEC COLLECT: HCPCS

## 2022-02-01 PROCEDURE — 83690 ASSAY OF LIPASE: CPT | Performed by: PHYSICIAN ASSISTANT

## 2022-02-01 PROCEDURE — 36415 COLL VENOUS BLD VENIPUNCTURE: CPT | Performed by: PHYSICIAN ASSISTANT

## 2022-02-01 PROCEDURE — 250N000011 HC RX IP 250 OP 636: Performed by: PHYSICIAN ASSISTANT

## 2022-02-01 PROCEDURE — G0378 HOSPITAL OBSERVATION PER HR: HCPCS

## 2022-02-01 PROCEDURE — 93005 ELECTROCARDIOGRAM TRACING: CPT

## 2022-02-01 PROCEDURE — 84484 ASSAY OF TROPONIN QUANT: CPT | Performed by: PHYSICIAN ASSISTANT

## 2022-02-01 PROCEDURE — 99285 EMERGENCY DEPT VISIT HI MDM: CPT | Mod: 25

## 2022-02-01 PROCEDURE — 250N000009 HC RX 250: Performed by: PHYSICIAN ASSISTANT

## 2022-02-01 PROCEDURE — 85014 HEMATOCRIT: CPT | Performed by: PHYSICIAN ASSISTANT

## 2022-02-01 PROCEDURE — 250N000013 HC RX MED GY IP 250 OP 250 PS 637: Performed by: PHYSICIAN ASSISTANT

## 2022-02-01 PROCEDURE — 99220 PR INITIAL OBSERVATION CARE,LEVEL III: CPT | Performed by: HOSPITALIST

## 2022-02-01 PROCEDURE — G1004 CDSM NDSC: HCPCS

## 2022-02-01 PROCEDURE — 85379 FIBRIN DEGRADATION QUANT: CPT | Performed by: PHYSICIAN ASSISTANT

## 2022-02-01 RX ORDER — ONDANSETRON 4 MG/1
4 TABLET, ORALLY DISINTEGRATING ORAL EVERY 6 HOURS PRN
Status: DISCONTINUED | OUTPATIENT
Start: 2022-02-01 | End: 2022-02-07 | Stop reason: HOSPADM

## 2022-02-01 RX ORDER — PANTOPRAZOLE SODIUM 40 MG/1
40 TABLET, DELAYED RELEASE ORAL
Status: DISCONTINUED | OUTPATIENT
Start: 2022-02-02 | End: 2022-02-07 | Stop reason: HOSPADM

## 2022-02-01 RX ORDER — AMOXICILLIN 250 MG
2 CAPSULE ORAL 2 TIMES DAILY
Status: DISCONTINUED | OUTPATIENT
Start: 2022-02-01 | End: 2022-02-07 | Stop reason: HOSPADM

## 2022-02-01 RX ORDER — HYDRALAZINE HYDROCHLORIDE 20 MG/ML
10 INJECTION INTRAMUSCULAR; INTRAVENOUS EVERY 4 HOURS PRN
Status: DISCONTINUED | OUTPATIENT
Start: 2022-02-01 | End: 2022-02-07 | Stop reason: HOSPADM

## 2022-02-01 RX ORDER — AMLODIPINE BESYLATE 5 MG/1
5 TABLET ORAL EVERY EVENING
Status: DISCONTINUED | OUTPATIENT
Start: 2022-02-01 | End: 2022-02-07 | Stop reason: HOSPADM

## 2022-02-01 RX ORDER — ATENOLOL 50 MG/1
50 TABLET ORAL EVERY MORNING
Status: DISCONTINUED | OUTPATIENT
Start: 2022-02-02 | End: 2022-02-07 | Stop reason: HOSPADM

## 2022-02-01 RX ORDER — CLOPIDOGREL BISULFATE 75 MG/1
75 TABLET ORAL DAILY
Status: DISCONTINUED | OUTPATIENT
Start: 2022-02-02 | End: 2022-02-07 | Stop reason: HOSPADM

## 2022-02-01 RX ORDER — ACETAMINOPHEN 500 MG
500 TABLET ORAL EVERY 8 HOURS SCHEDULED
Status: DISCONTINUED | OUTPATIENT
Start: 2022-02-01 | End: 2022-02-07 | Stop reason: HOSPADM

## 2022-02-01 RX ORDER — ACETAMINOPHEN 325 MG/1
650 TABLET ORAL EVERY 6 HOURS PRN
Status: DISCONTINUED | OUTPATIENT
Start: 2022-02-01 | End: 2022-02-07 | Stop reason: HOSPADM

## 2022-02-01 RX ORDER — ACETAMINOPHEN 325 MG/1
650 TABLET ORAL ONCE
Status: COMPLETED | OUTPATIENT
Start: 2022-02-01 | End: 2022-02-01

## 2022-02-01 RX ORDER — ACETAMINOPHEN 650 MG/1
650 SUPPOSITORY RECTAL EVERY 6 HOURS PRN
Status: DISCONTINUED | OUTPATIENT
Start: 2022-02-01 | End: 2022-02-07 | Stop reason: HOSPADM

## 2022-02-01 RX ORDER — ONDANSETRON 2 MG/ML
4 INJECTION INTRAMUSCULAR; INTRAVENOUS EVERY 6 HOURS PRN
Status: DISCONTINUED | OUTPATIENT
Start: 2022-02-01 | End: 2022-02-07 | Stop reason: HOSPADM

## 2022-02-01 RX ORDER — IOPAMIDOL 755 MG/ML
54 INJECTION, SOLUTION INTRAVASCULAR ONCE
Status: COMPLETED | OUTPATIENT
Start: 2022-02-01 | End: 2022-02-01

## 2022-02-01 RX ORDER — AMOXICILLIN 250 MG
1 CAPSULE ORAL 2 TIMES DAILY
Status: DISCONTINUED | OUTPATIENT
Start: 2022-02-01 | End: 2022-02-07 | Stop reason: HOSPADM

## 2022-02-01 RX ADMIN — SODIUM CHLORIDE 81 ML: 9 INJECTION, SOLUTION INTRAVENOUS at 16:06

## 2022-02-01 RX ADMIN — AMLODIPINE BESYLATE 5 MG: 5 TABLET ORAL at 21:47

## 2022-02-01 RX ADMIN — ACETAMINOPHEN 500 MG: 500 TABLET ORAL at 21:47

## 2022-02-01 RX ADMIN — ACETAMINOPHEN 650 MG: 325 TABLET, FILM COATED ORAL at 13:52

## 2022-02-01 RX ADMIN — IOPAMIDOL 54 ML: 755 INJECTION, SOLUTION INTRAVENOUS at 16:06

## 2022-02-01 RX ADMIN — SENNOSIDES AND DOCUSATE SODIUM 1 TABLET: 50; 8.6 TABLET ORAL at 21:47

## 2022-02-01 ASSESSMENT — ENCOUNTER SYMPTOMS
SHORTNESS OF BREATH: 0
DIARRHEA: 0
NAUSEA: 1
COUGH: 0
FEVER: 0
VOMITING: 0
ABDOMINAL PAIN: 0
NECK PAIN: 1

## 2022-02-01 NOTE — ED NOTES
[facility]  ED Nurse Handoff Report    ED Chief complaint: Chest Pain      ED Diagnosis:   Final diagnoses:   None       Code Status: To be addressed by admitting MD.     Allergies:   Allergies   Allergen Reactions     Lipitor [Atorvastatin Calcium] Cramps     Leg cramps     Lisinopril Swelling and Rash     Seasonal Allergies        Patient Story: Pt presents to the ED via EMS for evaluation of chest pain.   EMS report: lives at home with friend, hx of stroke at baseline for pt, since 0930 this AM chest pain that travels down right arm, EMS administered 4 baby ASA and 1x nitroglycerin, pre hospital blood sugar: 113.   Focused Assessment:   Cardiac: left bundle branch block. Hypertensive.   Resp: on room air. Respirations even and unlabored.    Neuro: hx of stroke at baseline per EMS.   Musculoskeletal: right arm pain      Treatments and/or interventions provided: IV, labs, imaging, continuous monitoring   Patient's response to treatments and/or interventions: Tolerated well     To be done/followed up on inpatient unit:  Further evaluation, inpatient orders.     Does this patient have any cognitive concerns?: NA per EMS at baseline     Activity level - Baseline/Home:  Unknown  Activity Level - Current:   Total Care    Patient's Preferred language: English   Needed?: No    Isolation: None  Infection: Not Applicable  Patient tested for COVID 19 prior to admission:  Bariatric?: No    Vital Signs:   Vitals:    02/01/22 1259 02/01/22 1300   BP:  (!) 160/58   Pulse: 75 71   Resp: 12 14   Temp: 98.9  F (37.2  C)    TempSrc: Oral    SpO2: 96%        Cardiac Rhythm:     Was the PSS-3 completed:   Yes  What interventions are required if any?               Family Comments: Roommate updated by ED Provider.   OBS brochure/video discussed/provided to patient/family: N/A              Name of person given brochure if not patient: NA              Relationship to patient: NA    For the majority of the shift  this patient's behavior was Green.   Behavioral interventions performed were frequent rounding and encouragement in ED.    ED NURSE PHONE NUMBER: [id number]       '

## 2022-02-01 NOTE — ED NOTES
Pt presents to the ED via EMS for evaluation of chest pain.   EMS report: lives at home with friend, hx of stroke at baseline for pt, since 0930 this AM chest pain that travels down right arm, EMS administered 4 baby ASA and 1x nitroglycerin, pre hospital blood sugar: 113.

## 2022-02-01 NOTE — ED PROVIDER NOTES
History     Chief Complaint:  Right shoulder pain.     The history is provided by the patient.      Steffany Brown is a 89 year old female on Plavix with history of stroke, hypertension, and hyperlipidemia who presents with right shoulder pain. The patient comes to the ED via EMS with the chief complaint of chest pain, however, upon exam, the patient denies any chest pain, and instead complains of right shoulder pain and neck pain which started at around 0900 this morning. She denies any falls or trauma to the area. She also endorses some nausea since yesterday. The patient denies any fever, shortness of breath, cough, abdominal pain, vomiting, diarrhea, or leg swelling.     Echocardiogram - 5/10/21  The visual ejection fraction is estimated at 55-60%.  No regional wall motion abnormalities noted.  The left ventricle is normal in structure, function and size.  The right ventricle is normal in structure, function and size.  The left atrium is moderately dilated.  There is mild to moderate (1-2+) mitral regurgitation.  There is mild (1+) tricuspid regurgitation.  PASP is 40 mmHg + CVP  There is no pericardial effusion.    Review of Systems   Constitutional: Negative for fever.   Respiratory: Negative for cough and shortness of breath.    Cardiovascular: Negative for chest pain and leg swelling.   Gastrointestinal: Positive for nausea. Negative for abdominal pain, diarrhea and vomiting.   Musculoskeletal: Positive for neck pain.        + shoulder pain   All other systems reviewed and are negative.    Allergies:  Atorvastatin Calcium  Lisinopril  Seasonal Allergies    Medications:    Norvasc  Tenormin  Plavix  Mevacor  Prilosec    Past Medical History:    Age-related macular degeneration  Stroke  Diverticulosis of colon  Hip fracture, intertrochanteric  Small bowel obstruction  Osteoporosis  Hyperlipidemia   Psoriasis  Rheumatoid arthritis  Hypertension   Hyponatremia    Past Surgical History:    Colonoscopy  x3  Dilation and curettage  Open reduction with IM nailing left hip fracture using Gamma nail   ORIF ankle  Anterior laparoscopic repair of sigmoid prolapse  Small intestine surgery  Stress echo  Appendectomy    Family History:    Mother: colon cancer  Father: cardiovascular  Brother: lung cancer     Social History:  The patient presents to the ED alone per EMS.  Steffany lives in a care facility.      Physical Exam     Patient Vitals for the past 24 hrs:   BP Temp Temp src Pulse Resp SpO2   02/01/22 1500 139/80 -- -- 68 15 95 %   02/01/22 1445 -- -- -- 74 15 96 %   02/01/22 1435 -- -- -- 67 18 94 %   02/01/22 1430 -- -- -- 64 20 94 %   02/01/22 1415 -- -- -- 67 18 97 %   02/01/22 1400 -- -- -- 66 17 95 %   02/01/22 1300 (!) 160/58 -- -- 71 14 --   02/01/22 1259 -- 98.9  F (37.2  C) Oral 75 12 96 %       Physical Exam  Constitutional: Pleasant. Cooperative.   Eyes: Pupils equally round and reactive  HENT: Head is normal in appearance. Oropharynx is normal with moist mucus membranes.  Cardiovascular: Regular rate and rhythm and without murmurs.  Respiratory: Normal respiratory effort, lungs are clear bilaterally.  GI: Abdomen is soft, non-tender, non-distended. No guarding, rebound, or rigidity.  Musculoskeletal: TTP to R shoulder, R humerus, R elbow. Able to range RUE, however in pain with movement of R shoulder.  Skin: Normal, without rash.  Neurologic: Difficult to assess due to patient's expressive aphasia.    Psychiatric: Normal affect.  Nursing notes and vital signs reviewed.      Emergency Department Course   ECG:  ECG taken at 1313, ECG read at 1319  Normal sinus rhythm. Left axis deviation. Left bundle branch block. Abnormal ECG.    No significant change as compared to prior, dated 5/11/21.  Rate 66 bpm. SD interval 184 ms. QRS duration 134 ms. QT/QTc 442/463 ms. P-R-T axes 53 -56 105.     Imaging:  CT Chest Pulmonary Embolism w Contrast   Final Result   IMPRESSION:   1.  No evidence for pulmonary artery  embolism.   2.  Extensive atherosclerosis. This includes coronary artery   calcifications but no definite dissection or aneurysm. There is a   small focal penetrating ulcer left lateral aspect of the ascending   thoracic aorta at the level of the hiatus (similar to the prior CT   from 5/10/2021).   3.  Patchy groundglass densities in the lungs likely represent   atelectasis.   4.  Hypoattenuating lesion in the left axilla is new since the prior   study and likely represents a small hematoma, seroma, small abscess,   or other necrotic lesion. Recommend clinical correlation. Ultrasound   may be helpful as clinically indicated.   5.  No definite lymphadenopathy is seen in the chest or axillary   regions.   6.  Motion artifact limits evaluation of the posterior lungs and   posterior pulmonary arteries.      RICKY STAFFORD MD            SYSTEM ID:  [id number]      Humerus XR, G/E 2 views, right   Final Result   IMPRESSION: Superior migration of the humeral head, suggestive of   rotator cuff tear. Narrowing of the acromiohumeral space. No acute   right humerus fracture or miguel angel dislocation identified. 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 Numerous surgical   clips present in the ventral right elbow. Diffuse bone   demineralization. Chronic healed posterolateral right-sided rib   fracture deformities.      JOSE PAULA MD            SYSTEM ID:  [id number]          Laboratory:  Labs Ordered and Resulted from Time of ED Arrival to Time of ED Departure   COMPREHENSIVE METABOLIC PANEL - Abnormal       Result Value    Sodium 135      Potassium 3.8      Chloride 103      Carbon Dioxide (CO2) 26      Anion Gap 6      Urea Nitrogen 11      Creatinine 0.49 (*)     Calcium 9.5      Glucose 124 (*)     Alkaline Phosphatase 95      AST 17      ALT 16      Protein Total 8.2      Albumin 3.6      Bilirubin Total 0.4      GFR Estimate 88     D DIMER QUANTITATIVE - Abnormal    D-Dimer Quantitative 1.04 (*)    CBC  WITH PLATELETS AND DIFFERENTIAL - Abnormal    WBC Count 15.4 (*)     RBC Count 5.27 (*)     Hemoglobin 15.9 (*)     Hematocrit 48.5 (*)     MCV 92      MCH 30.2      MCHC 32.8      RDW 11.7      Platelet Count 365      % Neutrophils 75      % Lymphocytes 11      % Monocytes 11      % Eosinophils 1      % Basophils 1      % Immature Granulocytes 1      NRBCs per 100 WBC 0      Absolute Neutrophils 11.6 (*)     Absolute Lymphocytes 1.7      Absolute Monocytes 1.7 (*)     Absolute Eosinophils 0.1      Absolute Basophils 0.1      Absolute Immature Granulocytes 0.1      Absolute NRBCs 0.0     TROPONIN I - Normal    Troponin I High Sensitivity 15     LIPASE - Normal    Lipase 123     INFLUENZA A/B & SARS-COV2 PCR MULTIPLEX - Normal    Influenza A PCR Negative      Influenza B PCR Negative      SARS CoV2 PCR Negative     TROPONIN I - Normal    Troponin I High Sensitivity 16         Emergency Department Course:    Reviewed:  I reviewed nursing notes, vitals, past history and care everywhere    Assessments:  1320 I obtained history and examined the patient as noted above.   1558 I rechecked the patient and explained findings.     Consults:   1713 I spoke with the patient's relative regarding her presentation and workup in the ED today.    Discussed the case with the hospitalist.    Interventions:  1352 Tylenol 650 mg PO     Disposition:  The patient was admitted to the hospital.    Impression & Plan      Medical Decision Making:  Steffany Brown is a 89 year old female who presents to the ED for evaluation of right shoulder pain.  Patient is aphasic, and thus history was difficult to obtain. Patient able to answer yes/no questions for history.  Per EMS and patient, it sounds as though patient may have struck her shoulder on a wall.  She is wheelchair-bound at baseline, and states that this did happen today.  See HPI as above for additional details.  Vitals and physical exam as above.  Differential was broad and included  atypical ACS, fracture, sprain, strain, dislocation, pneumothorax, PE, arrhythmia, pneumonia, among others. Work up obtained as above suggestive for MSK etiology of patient's symptoms at this time. Delta troponins negative, and again patient denied any chest pain or dyspnea to me. CT PE obtained as above without evidence for PTX, PE, PNA to suggest as etiology for patient's symptoms. ECG unchanged from previous and without any other concerning new findings. XR of humerus notable for superior migration of the humeral head suggestive for rotator cuff. Given pain with palpation and ROM of right shoulder, I suspect this as etiology of patient's symptoms. I discussed the case with the healthcare agent and her family member, who agreed that admission was likely in patient's best interest. Patient ultimately in agreement for admission. Patient may ultimately need placement as she lives at a private residence without any cares available to her. I discussed the case with the hospitalist, who agreed to admit the patient. Of note, patient has leukocytosis of unclear etiology at this time. Patient denies any infectious symptoms at this time.    Diagnosis:    ICD-10-CM    1. Acute pain of right shoulder  M25.511    2. Aphasia  R47.01     at baseline   3. History of CVA (cerebrovascular accident)  Z86.73    4. Leukocytosis  D72.829        Scribe Disclosure:  I, Kit Ricketts, am serving as a scribe at 1:10 PM on 2/1/2022 to document services personally performed by King Boateng PA-C based on my observations and the provider's statements to me.     This record was created at least in part using electronic voice recognition software, so please excuse any typographical errors.       King Boateng PA-C  02/01/22 2098

## 2022-02-01 NOTE — ED NOTES
Bed: ED02  Expected date:   Expected time:   Means of arrival:   Comments:  [place] - 512 - 89 F chest pain eta 1248

## 2022-02-01 NOTE — ED PROVIDER NOTES
Emergency Department Attending Supervision Note  2/1/2022  5:32 PM      I evaluated this patient in conjunction with Advanced Practice Clinician:    King Burch PA-C    Briefly, the patient presented with right shoulder pain. She originally had a chief complaint of chest pain, but now denies chest pain and states she has right shoulder pain and neck pain that started around 0900 this morning. She denies any possible trauma or fall. She has had some nausea since yesterday, but denies any fever, shortness of breath, cough, abdominal pain, vomiting, diarrhea, or leg swelling.    Examination:   ED Triage Vitals   Enc Vitals Group      BP 02/01/22 1300 (!) 160/58      Pulse 02/01/22 1259 75      Resp 02/01/22 1259 12      Temp 02/01/22 1259 98.9  F (37.2  C)      Temp src 02/01/22 1259 Oral      SpO2 02/01/22 1259 96 %      Weight --       Height --       Head Circumference --       Peak Flow --       Pain Score --       Pain Loc --       Pain Edu? --       Excl. in GC? --      General: Resting comfortably on the gurney  Head:  The scalp, face, and head appear normal  Eyes:  The pupils are normal    Conjunctivae and sclera appear normal  ENT:    The nose is normal    Ears/pinnae are normal  Neck:  Normal range of motion  Lungs: The lungs are clear  Heart:  Regular rate, normal rhythm, no murmur  Skin:  No rash or acute lesions noted.  Neuro:  Speech is normal and fluent.  No focal deficits.  Psych: Awake. Alert.  Normal affect.  Appropriate interactions    My impression/diagnosis is:    ***    Favian Story MD, MD

## 2022-02-01 NOTE — TELEPHONE ENCOUNTER
Calling with shoulder injury, and pain.   Pt thinking she bumped shoulder getting in and out of bed this morning.   Right shoulder pain started this morning, pt is WC bound. And Uses her arms to get around.   Severe pain with light touch.  Family plans on getting her into the ER for evaluation. The only way they can transport her is via EMS . They are going to call for non-urgent EMS.

## 2022-02-01 NOTE — H&P
[facility]  History and Physical   Hospitalist  Luis Hernandez MD       Steffany Brown MRN# [id number]   YOB: 1930 Age: 89 year old      Date of Admission:  2/1/2022         Assessment and Plan:   Steffany Brown is a 89 year old female with PMH significant for hypertension, CVA (with expressive aphasia, baseline right hemiparesis ), wheelchair-bound status , dyslipidemia , GERD was brought to ED by EMS for right shoulder pain.    History is extremely limited from the patient due to her expressive aphasia and history reviewed from her friend Isreal whose sister Kamini lives with the patient. However, per Kamini Bach herself has some memory issues and is unable to care for the patient or provide any meaningful history.      Right shoulder pain  likely rotator cuff tear  -admit under observation  -as noted above, unable to elaborate more history regarding the shoulder pain or any mechanical injury   -x-ray shoulder noted with superior migration of humeral head and concern for rotator cuff tear; no acute fracture or dislocation  -normal troponin, CT chest noted with no PE  -will schedule Tylenol 500 mg PO TID and PRN Tylenol  -will consult orthopedics    H/o CVA   Baseline expressive aphasia and right hemiplegia  wheelchair-bound status  -patient lives independently with a friend Kamini; per Kamini's sister Kamini Bach herself has some memory issues and is unable to care for the patient   -at this point patient seems to be total care and might need short term TCU versus long-term placement  -will have  for disposition planning  -continue PTA Plavix    Hypertension  -continue PTA amlodipine, atenolol; hydralazine IV PRN for SBP >180    Dyslipidemia  -will hold lovastatin while under observation status    GERD  -continue Prilosec    Clinically Significant Risk Factors Present on Admission              # Platelet Defect: home medication list includes an  antiplatelet medication          COVID status: asymptomatic COVID screening on admission 2/1/22 negative    # DVT prophylaxis: mechanical with PCD boots  # Code status: DNR/DNI    Care plan discussed with ED provider, patient and her friend Isreal over the phone           Primary Care Physician:   Leo Hancock [phone]         Chief Complaint:     Right shoulder pain    History is extremely limited from the patient due to her expressive aphasia and history reviewed from her friend Isreal whose sister Kamini lives with the patient. However, per Kamini Bach herself has some memory issues and is unable to care for the patient or provide any meaningful history.         History of Present Illness:     Steffany Brown is a 89 year old female with PMH significant for hypertension, CVA (with expressive aphasia, baseline right hemiparesis ), wheelchair-bound status , dyslipidemia , GERD was brought to ED by EMS for right shoulder pain. As noted history is extremely limited due to her expressive aphasia. Patient lives independently with a friend Kamini; per Kamini's sister Kamini Bach herself has some memory issues and is unable to care for the patient. Today Kamini called Isreal stating Steffany needs help. The patient was pointing to her right shoulder which was tender with worsened pain with any movement. EMS was called and patient was brought to ED for further evaluation.    In ED patient was seen by TJ Boateng, workup included x-ray which was noted with rotator cuff tear with superior migration of femoral head; hospitalist was requested admission for further evaluation as patient unable to manage herself at home .    The patient denies any fever, chills, rigors, chest pain or shortness of breath.  Denies pain abdomen.  No bowel or bladder disturbances.           Past Medical History:     Hypertension  CVA (with expressive aphasia, baseline right hemiparesis )  wheelchair-bound  status  Dyslipidemia  GERD             Past Surgical History:     Past Surgical History:   Procedure Laterality Date     COLONOSCOPY  5/14/03    Diverticulosis, single small polyp removed (at [facility])     COLONOSCOPY  6/26/08    diverticulosis, few scattered mild angioectasias, no polyps     COLONOSCOPY  7/17/2013    Procedure: COLONOSCOPY;  COLONOSCOPY ;  Surgeon: Romario Watters MD;  Location:  GI     [facility]       FRACTURE TX, HIP RT/LT  9/10/09    Open reduction with IM nailing of left hip fracture using a gamma nail     HYSTERECTOMY, LEODAN  10/03/11    s/p LEODAN/BSO to remove pelvic mass; pathology:  Serous cystadenofibroma, 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     OPEN REDUCTION INTERNAL FIXATION ANKLE  7/11/2014    Procedure: OPEN REDUCTION INTERNAL FIXATION ANKLE;  Surgeon: Emmanuel Gomez MD;  Location: [facility] OR     OPEN REDUCTION INTERNAL FIXATION HIP NAILING Right 7/20/2016    Procedure: OPEN REDUCTION INTERNAL FIXATION HIP NAILING;  Surgeon: Jeronimo Giordano MD;  Location:  OR     RECTAL SURGERY  1971    anterior laproscopic repair of sigmoid prolapse     SMALL INTESTINE SURGERY       STRESS ECHO ([facility])  4/98    stress echo (negative)     SURGICAL HISTORY OF -   3/12/05    attempted angioplasty/stent of right middle cerebral artery ( M1 segment), no effective     Z APPENDECTOMY  1985     [facility] COLONOSCOPY THRU STOMA W BIOPSY/CAUTERY TUMOR/POLYP/LESION  3/00    colonoscopy adenamatous polyp ([facility])              Home Medications:     Prior to Admission Medications   Prescriptions Last Dose Informant Patient Reported? Taking?   Multiple Vitamin (MULTIVITAMIN ADULT PO) Past Week at Unknown time Self Yes Yes   Sig: Take 1 tablet by mouth daily   acetaminophen (TYLENOL) 500 MG tablet as needed Self Yes Yes   Sig: Take 500-1,000 mg by mouth every 6 hours as needed    amLODIPine (NORVASC) 5 MG tablet Past Week at Unknown time Self No Yes   Sig: Take 1 tablet (5 mg) by mouth every evening    atenolol (TENORMIN) 50 MG tablet Past Week at Unknown time Self No Yes   Sig: Take 1 tablet (50 mg) by mouth every morning   clopidogrel (PLAVIX) 75 MG tablet Past Week at Unknown time Self No Yes   Sig: Take 1 tablet (75 mg) by mouth daily   lovastatin (MEVACOR) 40 MG tablet Past Week at Unknown time Self No Yes   Sig: Take 1 tablet (40 mg) by mouth At Bedtime   omeprazole (PRILOSEC) 40 MG DR capsule Past Week at Unknown time Self No Yes   Sig: Take 1 capsule (40 mg) by mouth every morning (before breakfast)      Facility-Administered Medications: None            Allergies:     Allergies   Allergen Reactions     Lipitor [Atorvastatin Calcium] Cramps     Leg cramps     Lisinopril Swelling and Rash     Seasonal Allergies             Social History:   Steffany Brown  reports that she quit smoking about 74 years ago. She has never used smokeless tobacco. She reports current alcohol use. She reports that she does not use drugs.              Family History:   Steffany Brown family history includes Cancer in her brother and mother; Cancer - colorectal in her mother; Cardiovascular in her father.    Family history was reviewed by myself from the charts and not pertinent to current presentation.           Review of Systems:   A10 point Review of Systems was done and were negative other than noted in the HPI.             Physical Exam:   Blood pressure 139/80, pulse 68, temperature 98.9  F (37.2  C), temperature source Oral, resp. rate 15, SpO2 95 %, not currently breastfeeding.  0 lbs 0 oz        Constitutional: Alert, awake , seems oriented; profound expressive aphasia, difficult understand speech; lying comfortably in bed in no apparent distress   HEENT: Pupils equal and reactive to light and accomodation, EOMI intact; neck supple no raised JVD or rigidity    Oral cavity: Moist mucosa   Cardiovascular: Normal s1 s2, regular rate and rhythm, no murmur   Lungs: B/l clear to auscultation, no wheezes or crepitations    Abdomen: Soft, nt, nd, no guarding, rigidity or rebound; BS +   LE : No edema   Musculoskeletal:  chronic right hemiparesis ; limited range of motion right shoulder    Neuro:  chronic right hemiparesis and expressive aphasia at baseline   Psychiatry: normal mood and affect  Skin: No obvious skin rashes or ulcers             Data:   All new lab and imaging data was reviewed in Epic.   Significant labs and imagings include:    As noted above in HPI  CBC with WC 15.4  EKG with normal sinus rhythm, old LBBB  negative COVID    x-ray humerus:  IMPRESSION: Superior migration of the humeral head, suggestive of  rotator cuff tear. Narrowing of the acromiohumeral space. No acute  right humerus fracture or miguel angel dislocation identified. 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 Numerous surgical  clips present in the ventral right elbow. Diffuse bone  demineralization. Chronic healed posterolateral right-sided rib  fracture deformities.    CT chest:   IMPRESSION:  1.  No evidence for pulmonary artery embolism.  2.  Extensive atherosclerosis. This includes coronary artery  calcifications but no definite dissection or aneurysm. There is a  small focal penetrating ulcer left lateral aspect of the ascending  thoracic aorta at the level of the hiatus (similar to the prior CT  from 5/10/2021).  3.  Patchy groundglass densities in the lungs likely represent  atelectasis.  4.  Hypoattenuating lesion in the left axilla is new since the prior  study and likely represents a small hematoma, seroma, small abscess,  or other necrotic lesion. Recommend clinical correlation. Ultrasound  may be helpful as clinically indicated.  5.  No definite lymphadenopathy is seen in the chest or axillary  regions.  6.  Motion artifact limits evaluation of the posterior lungs and  posterior pulmonary arteries.             Luis Hernandez MD  Hospitalist

## 2022-02-01 NOTE — PHARMACY-ADMISSION MEDICATION HISTORY
Pharmacy Medication History  Admission medication history interview status for the 2/1/2022  admission is complete. See EPIC admission navigator for prior to admission medications     Location of Interview: Patient room  Medication history sources: Patient and Surescripts    Significant changes made to the medication list:  none    In the past week, patient estimated taking medication this percent of the time: greater than 90%    Additional medication history information:   Patient did not take her medications yet today.    Medication reconciliation completed by provider prior to medication history? No    Time spent in this activity: 20 minutes    Prior to Admission medications    Medication Sig Last Dose Taking? Auth Provider   acetaminophen (TYLENOL) 500 MG tablet Take 500-1,000 mg by mouth every 6 hours as needed  as needed Yes Reported, Patient   amLODIPine (NORVASC) 5 MG tablet Take 1 tablet (5 mg) by mouth every evening Past Week at Unknown time Yes Leo Hancock MD   atenolol (TENORMIN) 50 MG tablet Take 1 tablet (50 mg) by mouth every morning Past Week at Unknown time Yes Leo Hancock MD   clopidogrel (PLAVIX) 75 MG tablet Take 1 tablet (75 mg) by mouth daily Past Week at Unknown time Yes Leo Hancock MD   lovastatin (MEVACOR) 40 MG tablet Take 1 tablet (40 mg) by mouth At Bedtime Past Week at Unknown time Yes Leo Hancock MD   Multiple Vitamin (MULTIVITAMIN ADULT PO) Take 1 tablet by mouth daily Past Week at Unknown time Yes Unknown, Entered By History   omeprazole (PRILOSEC) 40 MG DR capsule Take 1 capsule (40 mg) by mouth every morning (before breakfast) Past Week at Unknown time Yes Carl Reynolds PA       The information provided in this note is only as accurate as the sources available at the time of update(s)

## 2022-02-02 LAB
ALBUMIN UR-MCNC: 20 MG/DL
ANION GAP SERPL CALCULATED.3IONS-SCNC: 4 MMOL/L (ref 3–14)
APPEARANCE UR: ABNORMAL
BACTERIA #/AREA URNS HPF: ABNORMAL /HPF
BILIRUB UR QL STRIP: NEGATIVE
BUN SERPL-MCNC: 11 MG/DL (ref 7–30)
CALCIUM SERPL-MCNC: 8.8 MG/DL (ref 8.5–10.1)
CHLORIDE BLD-SCNC: 104 MMOL/L (ref 94–109)
CO2 SERPL-SCNC: 26 MMOL/L (ref 20–32)
COLOR UR AUTO: ABNORMAL
CREAT SERPL-MCNC: 0.52 MG/DL (ref 0.52–1.04)
ERYTHROCYTE [DISTWIDTH] IN BLOOD BY AUTOMATED COUNT: 11.8 % (ref 10–15)
GFR SERPL CREATININE-BSD FRML MDRD: 87 ML/MIN/1.73M2
GLUCOSE BLD-MCNC: 92 MG/DL (ref 70–99)
GLUCOSE UR STRIP-MCNC: NEGATIVE MG/DL
HCT VFR BLD AUTO: 42 % (ref 35–47)
HGB BLD-MCNC: 13.8 G/DL (ref 11.7–15.7)
HGB UR QL STRIP: ABNORMAL
KETONES UR STRIP-MCNC: NEGATIVE MG/DL
LEUKOCYTE ESTERASE UR QL STRIP: ABNORMAL
MCH RBC QN AUTO: 30.4 PG (ref 26.5–33)
MCHC RBC AUTO-ENTMCNC: 32.9 G/DL (ref 31.5–36.5)
MCV RBC AUTO: 93 FL (ref 78–100)
NITRATE UR QL: NEGATIVE
PH UR STRIP: 6.5 [PH] (ref 5–7)
PLATELET # BLD AUTO: 372 10E3/UL (ref 150–450)
POTASSIUM BLD-SCNC: 3.4 MMOL/L (ref 3.4–5.3)
RBC # BLD AUTO: 4.54 10E6/UL (ref 3.8–5.2)
RBC URINE: 3 /HPF
SODIUM SERPL-SCNC: 134 MMOL/L (ref 133–144)
SP GR UR STRIP: 1.02 (ref 1–1.03)
SQUAMOUS EPITHELIAL: <1 /HPF
UROBILINOGEN UR STRIP-MCNC: NORMAL MG/DL
WBC # BLD AUTO: 11.1 10E3/UL (ref 4–11)
WBC URINE: >182 /HPF

## 2022-02-02 PROCEDURE — G0378 HOSPITAL OBSERVATION PER HR: HCPCS

## 2022-02-02 PROCEDURE — 250N000013 HC RX MED GY IP 250 OP 250 PS 637: Performed by: HOSPITALIST

## 2022-02-02 PROCEDURE — 36415 COLL VENOUS BLD VENIPUNCTURE: CPT | Performed by: HOSPITALIST

## 2022-02-02 PROCEDURE — 85027 COMPLETE CBC AUTOMATED: CPT | Performed by: HOSPITALIST

## 2022-02-02 PROCEDURE — 96375 TX/PRO/DX INJ NEW DRUG ADDON: CPT

## 2022-02-02 PROCEDURE — 250N000011 HC RX IP 250 OP 636: Performed by: INTERNAL MEDICINE

## 2022-02-02 PROCEDURE — 99207 PR CDG-CODE CATEGORY CHANGED: CPT | Performed by: INTERNAL MEDICINE

## 2022-02-02 PROCEDURE — 87086 URINE CULTURE/COLONY COUNT: CPT | Performed by: INTERNAL MEDICINE

## 2022-02-02 PROCEDURE — 99225 PR SUBSEQUENT OBSERVATION CARE,LEVEL II: CPT | Performed by: INTERNAL MEDICINE

## 2022-02-02 PROCEDURE — 80048 BASIC METABOLIC PNL TOTAL CA: CPT | Performed by: HOSPITALIST

## 2022-02-02 PROCEDURE — 96374 THER/PROPH/DIAG INJ IV PUSH: CPT

## 2022-02-02 PROCEDURE — 81001 URINALYSIS AUTO W/SCOPE: CPT | Performed by: INTERNAL MEDICINE

## 2022-02-02 PROCEDURE — 250N000011 HC RX IP 250 OP 636: Performed by: PHYSICIAN ASSISTANT

## 2022-02-02 PROCEDURE — 250N000009 HC RX 250: Performed by: PHYSICIAN ASSISTANT

## 2022-02-02 RX ORDER — CEFTRIAXONE 1 G/1
1 INJECTION, POWDER, FOR SOLUTION INTRAMUSCULAR; INTRAVENOUS EVERY 24 HOURS
Status: DISCONTINUED | OUTPATIENT
Start: 2022-02-02 | End: 2022-02-04

## 2022-02-02 RX ORDER — LIDOCAINE HYDROCHLORIDE 10 MG/ML
10 INJECTION, SOLUTION EPIDURAL; INFILTRATION; INTRACAUDAL; PERINEURAL ONCE
Status: COMPLETED | OUTPATIENT
Start: 2022-02-02 | End: 2022-02-02

## 2022-02-02 RX ORDER — METHYLPREDNISOLONE ACETATE 40 MG/ML
40 INJECTION, SUSPENSION INTRA-ARTICULAR; INTRALESIONAL; INTRAMUSCULAR; SOFT TISSUE ONCE
Status: COMPLETED | OUTPATIENT
Start: 2022-02-02 | End: 2022-02-02

## 2022-02-02 RX ADMIN — AMLODIPINE BESYLATE 5 MG: 5 TABLET ORAL at 20:35

## 2022-02-02 RX ADMIN — PANTOPRAZOLE SODIUM 40 MG: 40 TABLET, DELAYED RELEASE ORAL at 06:19

## 2022-02-02 RX ADMIN — SENNOSIDES AND DOCUSATE SODIUM 1 TABLET: 50; 8.6 TABLET ORAL at 20:35

## 2022-02-02 RX ADMIN — LIDOCAINE HYDROCHLORIDE 6 ML: 10 INJECTION, SOLUTION EPIDURAL; INFILTRATION; INTRACAUDAL; PERINEURAL at 14:18

## 2022-02-02 RX ADMIN — METHYLPREDNISOLONE ACETATE 40 MG: 40 INJECTION, SUSPENSION INTRA-ARTICULAR; INTRALESIONAL; INTRAMUSCULAR; SOFT TISSUE at 14:19

## 2022-02-02 RX ADMIN — SENNOSIDES AND DOCUSATE SODIUM 1 TABLET: 50; 8.6 TABLET ORAL at 10:09

## 2022-02-02 RX ADMIN — ACETAMINOPHEN 500 MG: 500 TABLET ORAL at 14:25

## 2022-02-02 RX ADMIN — ACETAMINOPHEN 650 MG: 325 TABLET, FILM COATED ORAL at 20:34

## 2022-02-02 RX ADMIN — ATENOLOL 50 MG: 50 TABLET ORAL at 10:09

## 2022-02-02 RX ADMIN — ACETAMINOPHEN 500 MG: 500 TABLET ORAL at 06:19

## 2022-02-02 RX ADMIN — CEFTRIAXONE SODIUM 1 G: 1 INJECTION, POWDER, FOR SOLUTION INTRAMUSCULAR; INTRAVENOUS at 14:24

## 2022-02-02 RX ADMIN — CLOPIDOGREL BISULFATE 75 MG: 75 TABLET ORAL at 10:09

## 2022-02-02 NOTE — UTILIZATION REVIEW
"  Admission Status; Secondary Review Determination         Under the authority of the Utilization Management Committee, the utilization review process indicated a secondary review on the above patient.  The review outcome is based on review of the medical records, discussions with staff, and applying clinical experience noted on the date of the review.       (x) Observation Status Appropriate - This patient does not meet hospital inpatient criteria and is placed in observation status. If this patient's primary payer is Medicare and was admitted as an inpatient, Condition Code 44 should be used and patient status changed to \"observation\".         RATIONALE FOR DETERMINATION   The patient is a 89-year-old female who came to the emergency room due to persisting and worsening pain in her right shoulder.  She has severe rotator cuff disease with subluxation of the humeral head on the glenoid and migration superiorly.  She also has a history of a stroke and is unable to communicate verbally.  She has expressive aphasia with baseline right hemiparesis.  Evaluation done in the emergency room including troponin and chest x-ray and CT of her chest are unremarkable.  She does live with a friend who is unable to care for her if she is not able to move and a wheelchair.  No orthopedic consultation was done and a subacromial space steroid injection was injected to help with discomfort.  Patient was also found to have a UTI was started on Rocephin but does not appear to have sepsis or systemic significant involvement.  Based on current diagnosis and treatment, observation status remains appropriate.      The severity of illness, intensity of service provided, expected LOS and risk for adverse outcome make the care complex, high risk and appropriate for hospital admission.        The information on this document is developed by the utilization review team in order for the business office to ensure compliance.  This only denotes the " appropriateness of proper admission status and does not reflect the quality of care rendered.         The definitions of Inpatient Status and Observation Status used in making the determination above are those provided in the CMS Coverage Manual, Chapter 1 and Chapter 6, section 70.4.      Sincerely,     Alexander Lopez MD  Physician Advisor  Utilization Review/ Case Management  [facility].

## 2022-02-02 NOTE — PLAN OF CARE
Observation goals  PRIOR TO DISCHARGE        Comments:   Pain control -Partially Met   orthopedics evaluation and clearance  NOT MET  Nurse to notify provider when observation goals have been met and patient is ready for discharge.     Pt arrived from from ED at 2050. Hard to determine orientatiation due to hx of CVA and expressive aphasia. Hard of hearing, pt has hearing aids in. VSS on RA hypertensive, hans when sleeping. TELE Sinus hans with BBB. Pt complained of some pain in right shoulder, scheduled tylenol given. Right hand and leg contracted with some movement. Reg diet, needs help with ordering and feeding, pills whole in applesauce. Encouraging fluids each time staff is in room. Total Care, hard to determine pts baseline. Incontinent. Purewick in place Skin looks good other than scabs on legs. Turn and repo q2. Pt stated she has numbness and tingling bilateral upper and lower extremities. PCDs on. IV SL. Ortho and social work consulted to see pt. Discharge pending.

## 2022-02-02 NOTE — PROGRESS NOTES
Observation goals  PRIOR TO DISCHARGE        Comments:   Pain control -Partially Met   orthopedics evaluation and clearance  NOT MET  Nurse to notify provider when observation goals have been met and patient is ready for discharge.

## 2022-02-02 NOTE — PLAN OF CARE
Observation goals  PRIOR TO DISCHARGE        Comments:   Pain control -Partially Met   orthopedics evaluation and clearance  NOT MET  Nurse to notify provider when observation goals have been met and patient is ready for discharge.     Pt arrived from from ED at 2050. Hard to determine orientatiation due to hx of CVA and expressive aphasia. VSS on RA hypertensive. TELE Sinus hans with BBB. Pt complained of some pain in right shoulder, scheduled tylenol given. Right hand and leg contracted with some movement. Reg diet, needs help with ordering and feeding, pills whole in applesauce. Total Care, hard to determine pts baseline. Incontinent. Skin looks good other than scabs on legs. Turn and repo q2. Pt stated she has numbness and tingling bilateral upper and lower extremities. PCDs on. IV SL. Ortho and social work consulted to see pt

## 2022-02-02 NOTE — PROGRESS NOTES
Observation goals  PRIOR TO DISCHARGE        Pain control, orthopedics evaluation and clearance -partially met. Awaiting orthopedic consult. Pain controlled with tylenol.  Nurse to notify provider when observation goals have been met and patient is ready for discharge.

## 2022-02-02 NOTE — CONSULTS
[facility]    Orthopedic Consultation    Steffany Brown MRN# [id number]   Age: 89 year old YOB: 1930     Date of Admission: 2022    Reason for consult: Right shoulder pain, likely large rotator cuff tear        Requesting provider: Luis Hernandez       Level of consult: Consult, follow and place orders           Assessment and Plan:   Assessment:   Right shoulder pain, likely large rotator cuff tear      Plan:   No sign of infection.    Patient would like to proceed with a right shoulder subacromial injection (supplies ordered).  This will be completed later today.   Able to WBAT right UE  Ice to shoulder prn           Chief Complaint:   Right shoulder pain          History of Present Illness:   Steffany Brown is a 89 year old female with history of a CVA (with expressive aphasia, baseline right hemiparesis ), wheelchair-bound status brought to ED by EMS for right shoulder pain.  History is very limited due to her expressive aphasia but she does answer yes/no questions.  Per medical records, patient lives independently with a friend.  Yesterday, the patient was pointing to her right shoulder which was tender with worsened pain with any movement.  It is uncertain if any trauma has occurred to the shoulder.  Xrays were negative for fracture but did reveal a high riding humeral head.           Past Medical History:     Past Medical History:   Diagnosis Date     Age-related macular degeneration, dry, both eyes 10/24/14    per vitreoretinal specialists     Cerebral infarction (H)     aphasia and chronic right sided waekness     Diverticulosis of colon (without mention of hemorrhage)     Several diverticuli seen on colonoscopy     Eczema      Family history of colon cancer     mother  from colon cancer age 89     Hip fracture, intertrochanteric (H) 9/10/09    left hip fracture, S/P ORIF     History of small bowel obstruction ,      Osteoporosis, unspecified       Other and unspecified hyperlipidemia      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     Other speech disturbance 3/05    chronic dysarthria since CVI     Pelvic mass in female 10/3/11    s/p LEODAN/BSO; pathology:  Serous cystadenofibroma, 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     Personal history of colonic polyps 2000     Psoriasis      Rheumatoid arthritis(714.0)      Unspecified essential hypertension              Past Surgical History:     Past Surgical History:   Procedure Laterality Date     COLONOSCOPY  5/14/03    Diverticulosis, single small polyp removed (at [facility])     COLONOSCOPY  6/26/08    diverticulosis, few scattered mild angioectasias, no polyps     COLONOSCOPY  7/17/2013    Procedure: COLONOSCOPY;  COLONOSCOPY ;  Surgeon: Romario Watters MD;  Location:  GI     [facility]       FRACTURE TX, HIP RT/LT  9/10/09    Open reduction with IM nailing of left hip fracture using a gamma nail     HYSTERECTOMY, LEODAN  10/03/11    s/p LEODAN/BSO to remove pelvic mass; pathology:  Serous cystadenofibroma, 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     OPEN REDUCTION INTERNAL FIXATION ANKLE  7/11/2014    Procedure: OPEN REDUCTION INTERNAL FIXATION ANKLE;  Surgeon: Emmanuel Gomez MD;  Location:  OR     OPEN REDUCTION INTERNAL FIXATION HIP NAILING Right 7/20/2016    Procedure: OPEN REDUCTION INTERNAL FIXATION HIP NAILING;  Surgeon: Jeronimo Giordano MD;  Location: [facility] OR     RECTAL SURGERY  1971    anterior laproscopic repair of sigmoid prolapse     SMALL INTESTINE SURGERY       STRESS ECHO (METRO)  4/98    stress echo (negative)     SURGICAL HISTORY OF -   3/12/05    attempted angioplasty/stent of right middle cerebral artery ( M1 segment), no effective     [facility] APPENDECTOMY  1985     [facility] COLONOSCOPY THRU STOMA W BIOPSY/CAUTERY TUMOR/POLYP/LESION  3/00    colonoscopy adenamatous polyp ([facility])             Social History:     Social History      Tobacco Use     Smoking status: Former Smoker     Quit date: 1948     Years since quittin.1     Smokeless tobacco: Never Used   Substance Use Topics     Alcohol use: Yes     Comment: rare             Family History:     Family History   Problem Relation Age of Onset     Cancer Mother          of colon ca age 89     Cancer - colorectal Mother      Cardiovascular Father          at age 65     Cancer Brother          of lung ca age 65             Immunizations:     VACCINE/DOSE   Diptheria   DPT   DTAP   HBIG   Hepatitis A   Hepatitis B   HIB   Influenza   Measles   Meningococcal   MMR   Mumps   Pneumococcal   Polio   Rubella   Small Pox   TDAP   Varicella   Zoster             Allergies:     Allergies   Allergen Reactions     Lipitor [Atorvastatin Calcium] Cramps     Leg cramps     Lisinopril Swelling and Rash     Seasonal Allergies              Medications:     Current Facility-Administered Medications   Medication     acetaminophen (TYLENOL) tablet 650 mg    Or     acetaminophen (TYLENOL) Suppository 650 mg     acetaminophen (TYLENOL) tablet 500 mg     amLODIPine (NORVASC) tablet 5 mg     atenolol (TENORMIN) tablet 50 mg     clopidogrel (PLAVIX) tablet 75 mg     hydrALAZINE (APRESOLINE) injection 10 mg     lidocaine (PF) (XYLOCAINE) 1 % injection 10 mL     melatonin tablet 1 mg     methylPREDNISolone (DEPO-MEDROL) injection 40 mg     ondansetron (ZOFRAN-ODT) ODT tab 4 mg    Or     ondansetron (ZOFRAN) injection 4 mg     pantoprazole (PROTONIX) EC tablet 40 mg     senna-docusate (SENOKOT-S/PERICOLACE) 8.6-50 MG per tablet 1 tablet    Or     senna-docusate (SENOKOT-S/PERICOLACE) 8.6-50 MG per tablet 2 tablet             Review of Systems:   ROS:  10 point ROS neg other than the symptoms noted above in the HPI.            Physical Exam:   All vitals have been reviewed  Patient Vitals for the past 24 hrs:   BP Temp Temp src Pulse Resp SpO2 Weight   22 0810 133/58 97.5  F (36.4  C) Oral 66  16 94 % --   02/02/22 0511 (!) 153/58 97.6  F (36.4  C) Oral 61 16 91 % --   02/01/22 2300 (!) 169/78 98.4  F (36.9  C) Oral 65 16 94 % --   02/01/22 2232 -- -- -- -- 16 -- --   02/01/22 2050 (!) 163/68 98  F (36.7  C) Oral 63 18 93 % 48.9 kg (107 lb 12.9 oz)   02/01/22 1500 139/80 -- -- 68 15 95 % --   02/01/22 1445 -- -- -- 74 15 96 % --   02/01/22 1435 -- -- -- 67 18 94 % --   02/01/22 1430 -- -- -- 64 20 94 % --   02/01/22 1415 -- -- -- 67 18 97 % --   02/01/22 1400 -- -- -- 66 17 95 % --   02/01/22 1300 (!) 160/58 -- -- 71 14 -- --   02/01/22 1259 -- 98.9  F (37.2  C) Oral 75 12 96 % --       Intake/Output Summary (Last 24 hours) at 2/2/2022 1135  Last data filed at 2/2/2022 0800  Gross per 24 hour   Intake 240 ml   Output --   Net 240 ml         Physical Exam   Temp: 97.5  F (36.4  C) Temp src: Oral BP: 133/58 Pulse: 66   Resp: 16 SpO2: 94 % O2 Device: None (Room air)    Vital Signs with Ranges  Temp:  [97.5  F (36.4  C)-98.9  F (37.2  C)] 97.5  F (36.4  C)  Pulse:  [61-75] 66  Resp:  [12-20] 16  BP: (133-169)/(58-80) 133/58  SpO2:  [91 %-97 %] 94 %  107 lbs 12.88 oz    Constitutional: Pleasant, alert, answers yes/no questions.   HEENT: Head atraumatic normocephalic.  Respiratory: Unlabored breathing no audible wheeze  Cardiovascular: Regular rate and rhythm per pulses  GI: Abdomen non-distended.  Lymph/Hematologic: No lymphadenopathy in areas examined  Genitourinary:  No salgado  Skin: No rashes, no cyanosis, no edema.  Musculoskeletal: On exam of the right shoulder:  Skin is intact, no erythema or ecchymosis.  No effusion present.  Mild tenderness globally along shoulder.  Increased pain at 80 degrees of flexion and abduction.  Very limited ROM in these planes.  Able to actively flex to 60 degrees.  No pain with passive rotation.  Reports sensation to light touch and pulses are present.  Positive impingement testing.   Neurologic: normal without focal findings            Data:   All laboratory data  reviewed  Results for orders placed or performed during the hospital encounter of 02/01/22   Humerus XR, G/E 2 views, right     Status: None    Narrative    HUMERUS RIGHT TWO OR MORE VIEWS  DATE/TIME: 2/1/2022 2:44 PM    INDICATION: Pain, unclear if trauma.  COMPARISON: None available.      Impression    IMPRESSION: Superior migration of the humeral head, suggestive of  rotator cuff tear. Narrowing of the acromiohumeral space. No acute  right humerus fracture or miguel angel dislocation identified. 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 Numerous surgical  clips present in the ventral right elbow. Diffuse bone  demineralization. Chronic healed posterolateral right-sided rib  fracture deformities.    JOSE PAULA MD         SYSTEM ID:  [id number]   CT Chest Pulmonary Embolism w Contrast     Status: None    Narrative    CT CHEST PULMONARY EMBOLISM WITH CONTRAST 2/1/2022 4:25 PM    CLINICAL HISTORY: PE suspected, low/intermediate prob, positive  D-dimer; Chest pain/Right shoulder pain, elevated D-dimer.    TECHNIQUE: CT angiogram chest during arterial phase injection IV  contrast. 2D and 3D MIP reconstructions were performed by the CT  technologist. Dose reduction techniques were used.     CONTRAST: 54 mL [id number]    COMPARISON: CT aortic survey dated 5/10/2021.    FINDINGS:  ANGIOGRAM CHEST: Pulmonary arteries are normal caliber and negative  for pulmonary emboli. Thoracic aorta is negative for dissection. No CT  evidence of right heart strain.    LUNGS AND PLEURA: Mild patchy groundglass densities in bilateral lungs  are similar to the prior CT aortic survey and likely represent  atelectasis. Lungs are otherwise grossly clear. No significant nodule,  mass, infiltrate, effusion, or pneumothorax. Pleura is unremarkable.    MEDIASTINUM/AXILLAE: Heart is upper normal size. Extensive coronary  artery calcifications and/or stents are noted. Thoracic aorta is  grossly of normal caliber. There are thoracic  aortic calcifications  but no evidence for dissection or aneurysm. Probable small penetrating  ulcer in the left lateral aspect of the descending thoracic aorta at  level of the hiatus (image 208 series 4) is similar to the prior CT  angiogram dated 5/10/2021. No mediastinal, hilar, or axillary  lymphadenopathy is identified. Hypoattenuating structure in the left  axilla (image 50 series 4) measures approximately 2.1 x 2.7 cm  transaxially and 4.2 cm in craniocaudal dimension (image 36 series  10). This was not definitely seen on the prior CT examination and  could represent a hematoma, seroma, or small abscess. Recommend  clinical correlation. Thyroid is not well-seen on this study. Motion  artifact limits evaluation of the pulmonary arteries of the bilateral  lower lobes.    UPPER ABDOMEN: There is nonaneurysmal atherosclerosis. Radiopacities  in left intrarenal collecting system likely represent contrast  excretion. Visualized portions of the upper abdominal contents are  otherwise unremarkable.    MUSCULOSKELETAL: No aggressive osseous lesions or acute osseous  fractures are identified. There are degenerative changes in the spine  and left shoulder.      Impression    IMPRESSION:  1.  No evidence for pulmonary artery embolism.  2.  Extensive atherosclerosis. This includes coronary artery  calcifications but no definite dissection or aneurysm. There is a  small focal penetrating ulcer left lateral aspect of the ascending  thoracic aorta at the level of the hiatus (similar to the prior CT  from 5/10/2021).  3.  Patchy groundglass densities in the lungs likely represent  atelectasis.  4.  Hypoattenuating lesion in the left axilla is new since the prior  study and likely represents a small hematoma, seroma, small abscess,  or other necrotic lesion. Recommend clinical correlation. Ultrasound  may be helpful as clinically indicated.  5.  No definite lymphadenopathy is seen in the chest or axillary  regions.  6.   Motion artifact limits evaluation of the posterior lungs and  posterior pulmonary arteries.    RICKY STAFFORD MD         SYSTEM ID:  [id number]   Comprehensive metabolic panel     Status: Abnormal   Result Value Ref Range    Sodium 135 133 - 144 mmol/L    Potassium 3.8 3.4 - 5.3 mmol/L    Chloride 103 94 - 109 mmol/L    Carbon Dioxide (CO2) 26 20 - 32 mmol/L    Anion Gap 6 3 - 14 mmol/L    Urea Nitrogen 11 7 - 30 mg/dL    Creatinine 0.49 (L) 0.52 - 1.04 mg/dL    Calcium 9.5 8.5 - 10.1 mg/dL    Glucose 124 (H) 70 - 99 mg/dL    Alkaline Phosphatase 95 40 - 150 U/L    AST 17 0 - 45 U/L    ALT 16 0 - 50 U/L    Protein Total 8.2 6.8 - 8.8 g/dL    Albumin 3.6 3.4 - 5.0 g/dL    Bilirubin Total 0.4 0.2 - 1.3 mg/dL    GFR Estimate 88 >60 mL/min/1.73m2   Troponin I (now)     Status: Normal   Result Value Ref Range    Troponin I High Sensitivity 15 <54 ng/L   Extra Blue Top Tube     Status: None   Result Value Ref Range    Hold Specimen [facility]    D dimer quantitative     Status: Abnormal   Result Value Ref Range    D-Dimer Quantitative 1.04 (H) 0.00 - 0.50 ug/mL FEU    Narrative    This D-dimer assay is intended for use in conjunction with a clinical pretest probability assessment model to exclude pulmonary embolism (PE) and deep venous thrombosis (DVT) in outpatients suspected of PE or DVT. The cut-off value is 0.50 ug/mL FEU.   Lipase     Status: Normal   Result Value Ref Range    Lipase 123 73 - 393 U/L   CBC with platelets and differential     Status: Abnormal   Result Value Ref Range    WBC Count 15.4 (H) 4.0 - 11.0 10e3/uL    RBC Count 5.27 (H) 3.80 - 5.20 10e6/uL    Hemoglobin 15.9 (H) 11.7 - 15.7 g/dL    Hematocrit 48.5 (H) 35.0 - 47.0 %    MCV 92 78 - 100 fL    MCH 30.2 26.5 - 33.0 pg    MCHC 32.8 31.5 - 36.5 g/dL    RDW 11.7 10.0 - 15.0 %    Platelet Count 365 150 - 450 10e3/uL    % Neutrophils 75 %    % Lymphocytes 11 %    % Monocytes 11 %    % Eosinophils 1 %    % Basophils 1 %    % Immature Granulocytes 1 %    NRBCs per  100 WBC 0 <1 /100    Absolute Neutrophils 11.6 (H) 1.6 - 8.3 10e3/uL    Absolute Lymphocytes 1.7 0.8 - 5.3 10e3/uL    Absolute Monocytes 1.7 (H) 0.0 - 1.3 10e3/uL    Absolute Eosinophils 0.1 0.0 - 0.7 10e3/uL    Absolute Basophils 0.1 0.0 - 0.2 10e3/uL    Absolute Immature Granulocytes 0.1 <=0.4 10e3/uL    Absolute NRBCs 0.0 10e3/uL   Symptomatic; Unknown Influenza A/B & SARS-CoV2 (COVID-19) Virus PCR Multiplex Nasopharyngeal     Status: Normal    Specimen: Nasopharyngeal; Swab   Result Value Ref Range    Influenza A PCR Negative Negative    Influenza B PCR Negative Negative    SARS CoV2 PCR Negative Negative    Narrative    Testing was performed using the marsha SARS-CoV-2 & Influenza A/B Assay on the marsha Steph System. This test should be ordered for the detection of SARS-CoV-2 and influenza viruses in individuals who meet clinical and/or epidemiological criteria. Test performance is unknown in asymptomatic patients. This test is for in vitro diagnostic use under the FDA EUA for laboratories certified under CLIA to perform moderate and/or high complexity testing. This test has not been FDA cleared or approved. A negative result does not rule out the presence of PCR inhibitors in the specimen or target RNA in concentration below the limit of detection for the assay. If only one viral target is positive but coinfection with multiple targets is suspected, the sample should be re-tested with another FDA cleared, approved or authorized test, if coinfection would change clinical management. [facility] are certified under the Clinical Laboratory Improvement Amendments of 1988 (CLIA-88) as  qualified to perform moderate and/or high complexity laboratory testing.   Troponin I     Status: Normal   Result Value Ref Range    Troponin I High Sensitivity 16 <54 ng/L   Basic metabolic panel     Status: Normal   Result Value Ref Range    Sodium 134 133 - 144 mmol/L    Potassium 3.4 3.4 - 5.3 mmol/L    Chloride  104 94 - 109 mmol/L    Carbon Dioxide (CO2) 26 20 - 32 mmol/L    Anion Gap 4 3 - 14 mmol/L    Urea Nitrogen 11 7 - 30 mg/dL    Creatinine 0.52 0.52 - 1.04 mg/dL    Calcium 8.8 8.5 - 10.1 mg/dL    Glucose 92 70 - 99 mg/dL    GFR Estimate 87 >60 mL/min/1.73m2   CBC with platelets     Status: Abnormal   Result Value Ref Range    WBC Count 11.1 (H) 4.0 - 11.0 10e3/uL    RBC Count 4.54 3.80 - 5.20 10e6/uL    Hemoglobin 13.8 11.7 - 15.7 g/dL    Hematocrit 42.0 35.0 - 47.0 %    MCV 93 78 - 100 fL    MCH 30.4 26.5 - 33.0 pg    MCHC 32.9 31.5 - 36.5 g/dL    RDW 11.8 10.0 - 15.0 %    Platelet Count 372 150 - 450 10e3/uL   EKG 12 lead     Status: None   Result Value Ref Range    Systolic Blood Pressure  mmHg    Diastolic Blood Pressure  mmHg    Ventricular Rate 66 BPM    Atrial Rate 66 BPM    OK Interval 184 ms    QRS Duration 134 ms     ms    QTc 463 ms    P Axis 53 degrees    R AXIS -56 degrees    T Axis 105 degrees    Interpretation ECG       Sinus rhythm  Left axis deviation  Left bundle branch block  Abnormal ECG  When compared with ECG of 11-MAY-2021 03:51,  ST elevation now present in Anterior leads  Confirmed by GENERATED REPORT, COMPUTER ([id number]),  Komal Turpin ([id number]) on 2/1/2022 1:20:11 PM     CBC with platelets + differential *Canceled*     Status: None ()    Narrative    The following orders were created for panel order CBC with platelets + differential.  Procedure                               Abnormality         Status                     ---------                               -----------         ------                     CBC with platelets and d...[[id number]]                                                   Please view results for these tests on the individual orders.   Extra Tube     Status: None    Narrative    The following orders were created for panel order Extra Tube.  Procedure                               Abnormality         Status                     ---------                                -----------         ------                     Extra Blue Top Tube[[id number]]                              Final result                 Please view results for these tests on the individual orders.   CBC with platelets differential     Status: Abnormal    Narrative    The following orders were created for panel order CBC with platelets differential.  Procedure                               Abnormality         Status                     ---------                               -----------         ------                     CBC with platelets and d...[[id number]]  Abnormal            Final result                 Please view results for these tests on the individual orders.          Attestation:  I have reviewed today's vital signs, notes, medications, labs and imaging with Dr. Linder.  Amount of time performed on this consult: 30 minutes.    Jenifer Paredes PA-C

## 2022-02-02 NOTE — PLAN OF CARE
Observation goals  PRIOR TO DISCHARGE        Pain control, orthopedics evaluation and clearance -orthopedic consult-gave steroid injection. Pain controlled with scheduled tylenol and ice packs.  Nurse to notify provider when observation goals have been met and patient is ready for discharge.                  Patient AxOx4 per family friend Isreal.  Updated her and Kamini about patient and let her talk with them. Patient follows commands and is difficult to assess due to expressive aphasia.  BP elevated 160/71, PRN hydralazine >180.  Room air, lungs are clear.  Tolerating regular diet-needs help to call order in and needs to be fed/assist.  Ate well for lunch with assist.  Takes pills whole in applesauce. C/o pain in right shoulder-given scheduled tylenol, ice pack applied. Orthopedic consult today and she received steroid injection.  She reports neuropathy at baseline in bilateral hands and feet.  Turn and repo, incontinent, using purewick with adequate urine output, UA sent, started on rocephin. Tele SR with BBB.

## 2022-02-02 NOTE — PROGRESS NOTES
[facility]    Hospitalist Progress Note    Assessment & Plan   Steffany Brown is a 89 year old female with PMH significant for hypertension, CVA (with expressive aphasia, baseline right hemiparesis ), wheelchair-bound status , dyslipidemia , GERD was brought to ED by EMS for right shoulder pain.  Right shoulder pain  likely rotator cuff tear  -x-ray shoulder noted with superior migration of humeral head and concern for rotator cuff tear; no acute fracture or dislocation  -Appreciate orthopedic surgery input, plan for steroid injection noted.  -normal troponin, CT chest noted with no PE  -will schedule Tylenol 500 mg PO TID and PRN Tylenol  -PT/OT evaluation noted for discharge disposition.  UTI  --We will start on Rocephin, culture pending.  Patient does not appear to be in sepsis.  White count is trending down     H/o CVA   Baseline expressive aphasia and right hemiplegia  wheelchair-bound status  -patient lives independently with a friend Kamini; per Kamini's sister Isreal, Kamini herself has some memory issues and is unable to care for the patient   -at this point patient seems to be total care and might need short term TCU versus long-term placement  -will have  for disposition planning  -continue PTA Plavix     Hypertension  -continue PTA amlodipine, atenolol; hydralazine IV PRN for SBP >180     Dyslipidemia  -will hold lovastatin while under observation status     GERD  -continue Prilosec  DVT Prophylaxis: sequential compression device-patient has baseline wheelchair-bound.  Code Status: No CPR- Do NOT Intubate     Disposition: Expected discharge 1 to 2 days might need TCU on discharge.  Discussed with patient's roommate/sister Natalie Garcia, she mentions that patient might need TCU on discharge as her Sister Kamini cannot manage her at home.  Dianna Robbins MD  Text Page   (7am to 6pm)    Interval History   Patient has expressive aphasia, difficult to obtain history,  contacted patient's roommate sister, she is person to reach out in her situation at this point, she mentions that the patient moves around in a wheelchair, she lives with a friend who is 88 years old, they will not be able to manage her care if she is not able to move around in the wheelchair.  UA is abnormal.    -Data reviewed today: I reviewed all new labs and imaging results over the last 24 hours.  Physical Exam   Temp: 97.9  F (36.6  C) Temp src: Oral BP: (!) 161/71 Pulse: 60   Resp: 16 SpO2: 93 % O2 Device: None (Room air)    Vitals:    02/01/22 2050   Weight: 48.9 kg (107 lb 12.9 oz)     Vital Signs with Ranges  Temp:  [97.5  F (36.4  C)-98.4  F (36.9  C)] 97.9  F (36.6  C)  Pulse:  [60-74] 60  Resp:  [15-20] 16  BP: (133-169)/(58-80) 161/71  SpO2:  [91 %-97 %] 93 %  No intake/output data recorded.    Constitutional: Awake, expressive aphasia present  Respiratory: Clear to auscultation bilaterally, no crackles or wheezing  Cardiovascular: Regular rate and rhythm, normal S1 and S2, and no murmur noted  GI: Normal bowel sounds, soft, non-distended, non-tender  Skin/Integumen: No rashes, no cyanosis, no edema  Neuro : Lower extremity weakness present, right-sided weakness more pronounced    Medications       acetaminophen  500 mg Oral Q8H ABHILASH     amLODIPine  5 mg Oral QPM     atenolol  50 mg Oral QAM     cefTRIAXone  1 g Intravenous Q24H     clopidogrel  75 mg Oral Daily     lidocaine (PF)  10 mL Other Once     methylprednisoLONE acetate  40 mg INTRA-ARTICULAR Once     pantoprazole  40 mg Oral QAM AC     senna-docusate  1 tablet Oral BID    Or     senna-docusate  2 tablet Oral BID       Data   Recent Labs   Lab 02/02/22  0651 02/02/22  0626 02/01/22  1350   WBC 11.1*  --  15.4*   HGB 13.8  --  15.9*   MCV 93  --  92     --  365   NA  --  134 135   POTASSIUM  --  3.4 3.8   CHLORIDE  --  104 103   CO2  --  26 26   BUN  --  11 11   CR  --  0.52 0.49*   ANIONGAP  --  4 6   NICA  --  8.8 9.5   GLC  --  92 124*    ALBUMIN  --   --  3.6   PROTTOTAL  --   --  8.2   BILITOTAL  --   --  0.4   ALKPHOS  --   --  95   ALT  --   --  16   AST  --   --  17   LIPASE  --   --  123     Recent Labs   Lab 02/02/22  0626 02/01/22  1350   GLC 92 124*       Imaging:   Recent Results (from the past 24 hour(s))   Humerus XR, G/E 2 views, right    Narrative    HUMERUS RIGHT TWO OR MORE VIEWS  DATE/TIME: 2/1/2022 2:44 PM    INDICATION: Pain, unclear if trauma.  COMPARISON: None available.      Impression    IMPRESSION: Superior migration of the humeral head, suggestive of  rotator cuff tear. Narrowing of the acromiohumeral space. No acute  right humerus fracture or miguel angel dislocation identified. 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 Numerous surgical  clips present in the ventral right elbow. Diffuse bone  demineralization. Chronic healed posterolateral right-sided rib  fracture deformities.    JOSE PAULA MD         SYSTEM ID:  [id number]   CT Chest Pulmonary Embolism w Contrast    Narrative    CT CHEST PULMONARY EMBOLISM WITH CONTRAST 2/1/2022 4:25 PM    CLINICAL HISTORY: PE suspected, low/intermediate prob, positive  D-dimer; Chest pain/Right shoulder pain, elevated D-dimer.    TECHNIQUE: CT angiogram chest during arterial phase injection IV  contrast. 2D and 3D MIP reconstructions were performed by the CT  technologist. Dose reduction techniques were used.     CONTRAST: 54 mL [id number]-[id number]    COMPARISON: CT aortic survey dated 5/10/2021.    FINDINGS:  ANGIOGRAM CHEST: Pulmonary arteries are normal caliber and negative  for pulmonary emboli. Thoracic aorta is negative for dissection. No CT  evidence of right heart strain.    LUNGS AND PLEURA: Mild patchy groundglass densities in bilateral lungs  are similar to the prior CT aortic survey and likely represent  atelectasis. Lungs are otherwise grossly clear. No significant nodule,  mass, infiltrate, effusion, or pneumothorax. Pleura is unremarkable.    MEDIASTINUM/AXILLAE:  Heart is upper normal size. Extensive coronary  artery calcifications and/or stents are noted. Thoracic aorta is  grossly of normal caliber. There are thoracic aortic calcifications  but no evidence for dissection or aneurysm. Probable small penetrating  ulcer in the left lateral aspect of the descending thoracic aorta at  level of the hiatus (image 208 series 4) is similar to the prior CT  angiogram dated 5/10/2021. No mediastinal, hilar, or axillary  lymphadenopathy is identified. Hypoattenuating structure in the left  axilla (image 50 series 4) measures approximately 2.1 x 2.7 cm  transaxially and 4.2 cm in craniocaudal dimension (image 36 series  10). This was not definitely seen on the prior CT examination and  could represent a hematoma, seroma, or small abscess. Recommend  clinical correlation. Thyroid is not well-seen on this study. Motion  artifact limits evaluation of the pulmonary arteries of the bilateral  lower lobes.    UPPER ABDOMEN: There is nonaneurysmal atherosclerosis. Radiopacities  in left intrarenal collecting system likely represent contrast  excretion. Visualized portions of the upper abdominal contents are  otherwise unremarkable.    MUSCULOSKELETAL: No aggressive osseous lesions or acute osseous  fractures are identified. There are degenerative changes in the spine  and left shoulder.      Impression    IMPRESSION:  1.  No evidence for pulmonary artery embolism.  2.  Extensive atherosclerosis. This includes coronary artery  calcifications but no definite dissection or aneurysm. There is a  small focal penetrating ulcer left lateral aspect of the ascending  thoracic aorta at the level of the hiatus (similar to the prior CT  from 5/10/2021).  3.  Patchy groundglass densities in the lungs likely represent  atelectasis.  4.  Hypoattenuating lesion in the left axilla is new since the prior  study and likely represents a small hematoma, seroma, small abscess,  or other necrotic lesion. Recommend  clinical correlation. Ultrasound  may be helpful as clinically indicated.  5.  No definite lymphadenopathy is seen in the chest or axillary  regions.  6.  Motion artifact limits evaluation of the posterior lungs and  posterior pulmonary arteries.    RICKY STAFFORD MD         SYSTEM ID:  [id number]

## 2022-02-02 NOTE — PROCEDURES
Right shoulder    Following a sterile skin prep with betadine,  The right shoulder subacromial space was injected with 3 ml 1% lidocaine.  The subacromial space was then injected with 40mg depomedrol and 3 ml 1% lidocaine.  Patient tolerated procedure without complication.  All questions answered.

## 2022-02-03 ENCOUNTER — APPOINTMENT (OUTPATIENT)
Dept: OCCUPATIONAL THERAPY | Facility: CLINIC | Age: 87
End: 2022-02-03
Attending: INTERNAL MEDICINE
Payer: MEDICARE

## 2022-02-03 ENCOUNTER — APPOINTMENT (OUTPATIENT)
Dept: PHYSICAL THERAPY | Facility: CLINIC | Age: 87
End: 2022-02-03
Attending: INTERNAL MEDICINE
Payer: MEDICARE

## 2022-02-03 LAB
BACTERIA UR CULT: NO GROWTH
BACTERIA UR CULT: NORMAL
ERYTHROCYTE [DISTWIDTH] IN BLOOD BY AUTOMATED COUNT: 11.4 % (ref 10–15)
HCT VFR BLD AUTO: 44.3 % (ref 35–47)
HGB BLD-MCNC: 14.7 G/DL (ref 11.7–15.7)
MCH RBC QN AUTO: 30.1 PG (ref 26.5–33)
MCHC RBC AUTO-ENTMCNC: 33.2 G/DL (ref 31.5–36.5)
MCV RBC AUTO: 91 FL (ref 78–100)
PLATELET # BLD AUTO: 405 10E3/UL (ref 150–450)
RBC # BLD AUTO: 4.88 10E6/UL (ref 3.8–5.2)
WBC # BLD AUTO: 11.1 10E3/UL (ref 4–11)

## 2022-02-03 PROCEDURE — 96376 TX/PRO/DX INJ SAME DRUG ADON: CPT

## 2022-02-03 PROCEDURE — 97166 OT EVAL MOD COMPLEX 45 MIN: CPT | Mod: GO | Performed by: OCCUPATIONAL THERAPIST

## 2022-02-03 PROCEDURE — 99225 PR SUBSEQUENT OBSERVATION CARE,LEVEL II: CPT | Performed by: INTERNAL MEDICINE

## 2022-02-03 PROCEDURE — 85027 COMPLETE CBC AUTOMATED: CPT | Performed by: INTERNAL MEDICINE

## 2022-02-03 PROCEDURE — 97162 PT EVAL MOD COMPLEX 30 MIN: CPT | Mod: GP

## 2022-02-03 PROCEDURE — G0378 HOSPITAL OBSERVATION PER HR: HCPCS

## 2022-02-03 PROCEDURE — 97110 THERAPEUTIC EXERCISES: CPT | Mod: GP

## 2022-02-03 PROCEDURE — 97530 THERAPEUTIC ACTIVITIES: CPT | Mod: GP

## 2022-02-03 PROCEDURE — 250N000013 HC RX MED GY IP 250 OP 250 PS 637: Performed by: HOSPITALIST

## 2022-02-03 PROCEDURE — 97530 THERAPEUTIC ACTIVITIES: CPT | Mod: GO | Performed by: OCCUPATIONAL THERAPIST

## 2022-02-03 PROCEDURE — 36415 COLL VENOUS BLD VENIPUNCTURE: CPT | Performed by: INTERNAL MEDICINE

## 2022-02-03 PROCEDURE — 250N000011 HC RX IP 250 OP 636: Performed by: INTERNAL MEDICINE

## 2022-02-03 RX ADMIN — ATENOLOL 50 MG: 50 TABLET ORAL at 10:11

## 2022-02-03 RX ADMIN — SENNOSIDES AND DOCUSATE SODIUM 1 TABLET: 50; 8.6 TABLET ORAL at 10:10

## 2022-02-03 RX ADMIN — SENNOSIDES AND DOCUSATE SODIUM 1 TABLET: 50; 8.6 TABLET ORAL at 21:13

## 2022-02-03 RX ADMIN — ACETAMINOPHEN 500 MG: 500 TABLET ORAL at 06:46

## 2022-02-03 RX ADMIN — ACETAMINOPHEN 500 MG: 500 TABLET ORAL at 14:05

## 2022-02-03 RX ADMIN — AMLODIPINE BESYLATE 5 MG: 5 TABLET ORAL at 21:13

## 2022-02-03 RX ADMIN — CLOPIDOGREL BISULFATE 75 MG: 75 TABLET ORAL at 10:11

## 2022-02-03 RX ADMIN — CEFTRIAXONE SODIUM 1 G: 1 INJECTION, POWDER, FOR SOLUTION INTRAMUSCULAR; INTRAVENOUS at 14:05

## 2022-02-03 RX ADMIN — ACETAMINOPHEN 500 MG: 500 TABLET ORAL at 21:12

## 2022-02-03 RX ADMIN — PANTOPRAZOLE SODIUM 40 MG: 40 TABLET, DELAYED RELEASE ORAL at 06:46

## 2022-02-03 NOTE — PLAN OF CARE
Observation goals  PRIOR TO DISCHARGE        Pain control, orthopedics evaluation and clearance -orthopedic consult-gave steroid injection. Pain controlled with tylenol and ice packs.  Nurse to notify provider when observation goals have been met and patient is ready for discharge.

## 2022-02-03 NOTE — PROGRESS NOTES
JANET left generic VM for leah Hamilton, requesting return call. Return call pending.    JANET to continue to follow and assist with discharge planning.      GERSON Webb  Daytime (8:00am-4:30pm): [phone]  After-Hours JANET Pager (4:30pm-11:30pm): [phone]

## 2022-02-03 NOTE — PROGRESS NOTES
Pain control,  Yes   orthopedics evaluation and clearance  Yes  Awaiting TCU placement  Nurse to notify provider when observation goals have been met and patient is ready for discharge

## 2022-02-03 NOTE — PROGRESS NOTES
[facility]    Hospitalist Progress Note    Assessment & Plan   Steffany Brown is a 89 year old female with PMH significant for hypertension, CVA (with expressive aphasia, baseline right hemiparesis ), wheelchair-bound status , dyslipidemia , GERD was brought to ED by EMS for right shoulder pain.  Right shoulder pain  likely rotator cuff tear  -x-ray shoulder noted with superior migration of humeral head and concern for rotator cuff tear; no acute fracture or dislocation  -Appreciate orthopedic surgery input, plan for steroid injection noted.  Patient had steroid injections to the right shoulder on 2/2  -normal troponin, CT chest noted with no PE  -will schedule Tylenol 500 mg PO TID and PRN Tylenol  -PT/OT is currently recommending TCU, considering her living facility she would need TCU for recovery.  UTI  --Started on Rocephin 2/2, culture pending, please follow-up.     H/o CVA   Baseline expressive aphasia and right hemiplegia  wheelchair-bound status  -patient lives independently with a friend Kamini; per Kamini's sister Isreal, Kamini herself has some memory issues and is unable to care for the patient   -at this point patient seems to be total care and might need short term TCU versus long-term placement  -continue PTA Plavix     Hypertension  -continue PTA amlodipine, atenolol; hydralazine IV PRN for SBP >180     Dyslipidemia  -will hold lovastatin while under observation status     GERD  -continue Prilosec  DVT Prophylaxis: sequential compression device-patient has baseline wheelchair-bound.  Code Status: No CPR- Do NOT Intubate     Disposition: Expected discharge to TCU as early as 2/3  Dianna Robbins MD  Text Page   (7am to 6pm)    Interval History   Patient has expressive aphasia at baseline, seems shoulder pain is improved, able to lift her right upper extremity, denies any new concerns.    -Data reviewed today: I reviewed all new labs and imaging results over the last 24  hours.  Physical Exam   Temp: 97.8  F (36.6  C) Temp src: Oral BP: 137/66 Pulse: 64   Resp: 17 SpO2: 96 % O2 Device: None (Room air)    Vitals:    02/01/22 2050   Weight: 48.9 kg (107 lb 12.9 oz)     Vital Signs with Ranges  Temp:  [97.3  F (36.3  C)-98.1  F (36.7  C)] 97.8  F (36.6  C)  Pulse:  [56-70] 64  Resp:  [16-24] 17  BP: (128-161)/(50-86) 137/66  SpO2:  [92 %-98 %] 96 %  I/O last 3 completed shifts:  In: 780 [P.O.:780]  Out: 640 [Urine:640]    Constitutional: Awake, expressive aphasia present  Respiratory: Clear to auscultation bilaterally, no crackles or wheezing  Cardiovascular: Regular rate and rhythm, normal S1 and S2, and no murmur noted  GI: Normal bowel sounds, soft, non-distended, non-tender  Skin/Integumen: No rashes, no cyanosis, no edema  Neuro : Lower extremity weakness present, right-sided weakness more pronounced    Medications       acetaminophen  500 mg Oral Q8H ABHILASH     amLODIPine  5 mg Oral QPM     atenolol  50 mg Oral QAM     cefTRIAXone  1 g Intravenous Q24H     clopidogrel  75 mg Oral Daily     pantoprazole  40 mg Oral QAM AC     senna-docusate  1 tablet Oral BID    Or     senna-docusate  2 tablet Oral BID       Data   Recent Labs   Lab 02/03/22  0701 02/02/22  0651 02/02/22  0626 02/01/22  1350   WBC 11.1* 11.1*  --  15.4*   HGB 14.7 13.8  --  15.9*   MCV 91 93  --  92    372  --  365   NA  --   --  134 135   POTASSIUM  --   --  3.4 3.8   CHLORIDE  --   --  104 103   CO2  --   --  26 26   BUN  --   --  11 11   CR  --   --  0.52 0.49*   ANIONGAP  --   --  4 6   NICA  --   --  8.8 9.5   GLC  --   --  92 124*   ALBUMIN  --   --   --  3.6   PROTTOTAL  --   --   --  8.2   BILITOTAL  --   --   --  0.4   ALKPHOS  --   --   --  95   ALT  --   --   --  16   AST  --   --   --  17   LIPASE  --   --   --  123     Recent Labs   Lab 02/02/22  0626 02/01/22  1350   GLC 92 124*       Imaging:   No results found for this or any previous visit (from the past 24 hour(s)).

## 2022-02-03 NOTE — PROGRESS NOTES
02/03/22 1100   Quick Adds   Quick Adds Certification   Type of Visit Initial PT Evaluation       Present no   Living Environment   People in home other (see comments)  (Friend, Kamini)   Current Living Arrangements house   Home Accessibility wheelchair accessible   Transportation Anticipated family or friend will provide   Living Environment Comments Patient lives with friendKamini, in wheelchair accessible home.   Self-Care   Usual Activity Tolerance fair   Current Activity Tolerance poor   Equipment Currently Used at Home wheelchair, manual   Activity/Exercise/Self-Care Comment Patient is aphasic at baseline so difficulty providing information on prior level of function.  From the chart, it appears that patient's friend assists with all dressing, bathing, and toileting task.  This friend also assists patient from bed to manual wheelchair, which is her primary mode on transportation in the home.   Disability/Function   Hearing Difficulty or Deaf yes   Use of hearing assistive devices bilateral hearing aids   Walking or Climbing Stairs Difficulty yes   Walking or Climbing Stairs ambulation difficulty, dependent;transferring difficulty, assistance 1 person   Mobility Management Ax1 for transfers, manual wheelchair for mobility   Dressing/Bathing Difficulty yes   Dressing/Bathing bathing difficulty, assistance 1 person;dressing difficulty, assistance 1 person   Dressing/Bathing Management Ax1 for dressing and bathing   Toileting issues yes   Toileting Assistance toileting difficulty, assistance 1 person   Doing Errands Independently Difficulty (such as shopping) yes   Errands Management Friends assist with transportation.   Fall history within last six months   (Patient unable to state.)   Change in Functional Status Since Onset of Current Illness/Injury yes   General Information   Onset of Illness/Injury or Date of Surgery 02/01/22   Referring Physician Dianna Robbins MD   Patient/Family  Therapy Goals Statement (PT) Patient does not state.   Pertinent History of Current Problem (include personal factors and/or comorbidities that impact the POC) 89-year-old female with PMH significant for hypertension, CVA (with expressive aphasia, right hemiparesis), wheelchair-bound status, dyslipidemia , GERD was brought to ED by EMS for right shoulder pain.  Patient received right shoulder subacromial lidocaine injection 2/2/2022.   Existing Precautions/Restrictions fall   Weight-Bearing Status - RUE weight-bearing as tolerated   Cognition   Orientation Status (Cognition) oriented to;person   Affect/Mental Status (Cognition) emotionally labile;sad/depressed affect   Follows Commands (Cognition) unable/difficult to assess;verbal cues/prompting required;repetition of directions required   Pain Assessment   Patient Currently in Pain Yes, see Vital Sign flowsheet  (Right shoulder pain (pt unable to state number d/t aphasia))   Integumentary/Edema   Integumentary/Edema Comments Age-related skin changes   Posture    Posture Forward head position;Protracted shoulders   Range of Motion (ROM)   ROM Quick Adds ROM deficits secondary to pain;ROM deficits secondary to weakness   ROM Comment Right shoulder grossly limited with all active range of motion due to pain, right hand appears in flexion contracture but able to actively extend through limited range, left upper extremity and bilateral lower extremities present within functional limits   Strength   Manual Muscle Testing Quick Adds Able to perform R SLR;Able to perform L SLR;Deficits observed during functional mobility   Strength Comments Generalized lower extremity weakness, grossly 4-/5   Bed Mobility   Comment (Bed Mobility) Not formally assessed on evaluation as patient starting and ending session seated in bedside chair   Transfers   Transfer Safety Comments Patient performs sit <> stand transfer from bedside chair with Charmaine Galvin and min-modAx2.  Patient able to  position bilateral feet on Charmaine Stedy with cuing and reaching for bar with left upper extremity, patient unable to reach with right lower extremity due to increased pain.  Patient performs x1 sit <> stand with Ax2, able to maintain standing position 10 seconds before seated rest.  Patient becoming tearful, unable to state why due to aphasia, so returned to sitting in bedside chair.  RN notified that patient upset, may be due to right shoulder pain but unclear.  Patient left seated in chair with lower extremities elevated on stool and pillow positioned to support right upper extremity, left with chair alarm activated and call light in reach.   Gait/Stairs (Locomotion)   Comment (Gait/Stairs) Gait not initiated on evaluation.  From chart, patient with wheelchair-bound status at baseline.   Balance   Balance Comments Impaired dynamic balance   Sensory Examination   Sensory Perception Comments Baseline neuropathy of bilateral hands and feet   Clinical Impression   Criteria for Skilled Therapeutic Intervention yes, treatment indicated   PT Diagnosis (PT) Impaired functional mobility   Influenced by the following impairments Generalized weakness, poor activity tolerance, impaired dynamic balance, Baseline neuropathy of bilateral hands and feet, aphasia   Functional limitations due to impairments Impaired independence with bed mobility, transfers, and gait   Clinical Presentation Evolving/Changing   Clinical Presentation Rationale Clinical judgement, PMH, social support   Clinical Decision Making (Complexity) moderate complexity   Therapy Frequency (PT) 5x/week   Predicted Duration of Therapy Intervention (days/wks) 4 days   Planned Therapy Interventions (PT) bed mobility training;home exercise program;patient/family education;ROM (range of motion);strengthening;stretching;wheelchair management/propulsion training;transfer training;progressive activity/exercise   Anticipated Equipment Needs at Discharge (PT)   (Will  continue to assess equipment needs, pt owns manual w/c)   Risk & Benefits of therapy have been explained evaluation/treatment results reviewed;care plan/treatment goals reviewed;risks/benefits reviewed;current/potential barriers reviewed;participants included;patient   PT Discharge Planning    PT Discharge Recommendation (DC Rec) Long term care facility;Transitional Care Facility   PT Rationale for DC Rec From chart review (due to baseline aphasia), patient appears below prior level of function.  Patient was living with a friend who was assisting with all ADLs and mobility tasks.  Patient was primarily utilizing a manual wheelchair to mobilize in the home.  At this time, patient with significant right shoulder pain which limited her independence with transfers causing her to require Ax2.  Patient's friend unable to provide this level of care; therefore, recommend transition to long term care facility.  Patient does not appear to be good candidate for TCU given wheelchair-bound status and chronicity of condition.  Patient owns a manual wheelchair but would benefit from a hospital bed, bedside commode, and Neida lift at discharge.   Therapy Certification   Start of care date 02/03/22   Certification date from 02/03/22   Certification date to 02/10/22   Medical Diagnosis Acute pain of right shoulder   Total Evaluation Time   Total Evaluation Time (Minutes) 8

## 2022-02-03 NOTE — PROGRESS NOTES
[facility]      OUTPATIENT PHYSICAL THERAPY EVALUATION  PLAN OF TREATMENT FOR OUTPATIENT REHABILITATION  (COMPLETE FOR INITIAL CLAIMS ONLY)  Patient's Last Name, First Name, M.I.  YOB: 1930  LexiidarlinSteffany SEN                        Provider's Name  [facility] Medical Record No.  [id number]                               Onset Date:  02/01/22   Start of Care Date:  02/03/22      Type:     _X_PT   ___OT   ___SLP Medical Diagnosis:  Acute pain of right shoulder                        PT Diagnosis:  Impaired functional mobility   Visits from SOC:  1   _________________________________________________________________________________  Plan of Treatment/Functional Goals    Planned Interventions: bed mobility training,home exercise program,patient/family education,ROM (range of motion),strengthening,stretching,wheelchair management/propulsion training,transfer training,progressive activity/exercise     Goals: See Physical Therapy Goals on Care Plan in [place] electronic health record.    Therapy Frequency: 5x/week  Predicted Duration of Therapy Intervention: 4 days  _________________________________________________________________________________    I CERTIFY THE NEED FOR THESE SERVICES FURNISHED UNDER        THIS PLAN OF TREATMENT AND WHILE UNDER MY CARE     (Physician co-signature of this document indicates review and certification of the therapy plan).                Certification date from: 02/03/22, Certification date to: 02/10/22    Referring Physician: Dianna Robbins MD            Initial Assessment        See Physical Therapy evaluation dated 02/03/22 in Epic electronic health record.

## 2022-02-03 NOTE — PROGRESS NOTES
Orthopedic Surgery    Assessment: Right shoulder pain, probable large rotator cuff tear    Patient resting comfortably in bed.    States that her shoulder pain has improved since receiving the injection    /61 (BP Location: Left arm)   Pulse 67   Temp 97.3  F (36.3  C) (Oral)   Resp 18   Wt 48.9 kg (107 lb 12.9 oz)   LMP  (LMP Unknown)   SpO2 93%   BMI 21.05 kg/m      Patient remains unable to actively flex or extend the shoulder but has much less pain with passive ROM.  Able to flex and extend her wrist and fingers.  Sensation and pulses intact.    Plan:  Ice shoulder prn  WBAT with gentle ROM  Follow-up with shoulder specialist in outpatient setting if continued pain  Ortho to sign off, call if questions.      Jenifer Paredes PA-C on 2/3/2022 at 11:10 AM

## 2022-02-03 NOTE — PROGRESS NOTES
Pain control,  Yes   orthopedics evaluation and clearance  Yes  Awaiting TCU placement  Nurse to notify provider when observation goals have been met and patient is ready for discharge.

## 2022-02-03 NOTE — PROGRESS NOTES
"Alert, expressive aphasia, replies \"yes\" to most questions, but incons follows commands. Hx CVA, R hemiparesis, wheelchair bound. Repo, lift. Tele NS BBB. Purewick. Retention of urine, but able to void with encouragement. Assist with feeding, encourage fluids. Steroid shot yesterday R shoulder rotator cuff tear. Pain tx tylenol, ice. UTI rochephin. Discharge to TCU pending placement.   "

## 2022-02-03 NOTE — PLAN OF CARE
[facility]      OUTPATIENT OCCUPATIONAL THERAPY  EVALUATION  PLAN OF TREATMENT FOR OUTPATIENT REHABILITATION  (COMPLETE FOR INITIAL CLAIMS ONLY)  Patient's Last Name, First Name, M.I.  YOB: 1930  Steffany Brown                          Provider's Name  [facility] Medical Record No.  [id number]                               Onset Date:  02/02/22   Start of Care Date:  02/03/22     Type:     ___PT   _X_OT   ___SLP Medical Diagnosis:  RC tear                        OT Diagnosis:  impaired ADLs   Visits from SOC:  1   _________________________________________________________________________________  Plan of Treatment/Functional Goals    Planned Interventions: strengthening,transfer training   Goals: See Occupational Therapy Goals on Care Plan in [facility] electronic health record.    Therapy Frequency: 5x/week  Predicted Duration of Therapy Intervention: 3 days  _________________________________________________________________________________    I CERTIFY THE NEED FOR THESE SERVICES FURNISHED UNDER        THIS PLAN OF TREATMENT AND WHILE UNDER MY CARE     (Physician co-signature of this document indicates review and certification of the therapy plan).              Certification date from: 02/03/22, Certification date to: 02/03/22    Referring Physician: Dianna Robbins            Initial Assessment        See Occupational Therapy evaluation dated 02/03/22 in Epic electronic health record.

## 2022-02-03 NOTE — PROGRESS NOTES
02/03/22 0941   Quick Adds   Type of Visit Initial Occupational Therapy Evaluation   Living Environment   People in home other (see comments)   Current Living Arrangements house   Home Accessibility wheelchair accessible   Transportation Anticipated family or friend will provide   Living Environment Comments Per friend, Isreal, pt lives with friend Kamini.    Self-Care   Usual Activity Tolerance fair   Current Activity Tolerance poor   Equipment Currently Used at Home wheelchair, manual  (has walker available)   Activity/Exercise/Self-Care Comment Friend Kamini assists with all ADLs, including dressing, meals, toilet tx, bed <> wheelchair, wheelchair mobility   Instrumental Activities of Daily Living (IADL)   IADL Comments friend completes; MOW 3x/week; neighbor and Kamini's sister (Isreal) check in regularly   Disability/Function   Hearing Difficulty or Deaf yes   Patient's preferred means of communication verbal   Describe hearing loss bilateral hearing loss   Use of hearing assistive devices bilateral hearing aids   Were auxiliary aids offered? no   Hearing Management speak loud and clear   Walking or Climbing Stairs Difficulty yes   Walking or Climbing Stairs ambulation difficulty, dependent;stair climbing difficulty, dependent   Mobility Management wheelchair; Ax1 for transfers   Dressing/Bathing Difficulty yes   Dressing/Bathing bathing difficulty, dependent;dressing difficulty, dependent   Toileting issues yes   Toileting Assistance toileting difficulty, assistance 1 person   Doing Errands Independently Difficulty (such as shopping) yes   Change in Functional Status Since Onset of Current Illness/Injury yes   General Information   Onset of Illness/Injury or Date of Surgery 02/02/22   Referring Physician Dianna Robbins   Patient/Family Therapy Goal Statement (OT) not stated   Additional Occupational Profile Info/Pertinent History of Current Problem 89 year old female with PMH significant for  "hypertension, CVA (with expressive aphasia, baseline right hemiparesis ), wheelchair-bound status , dyslipidemia , GERD was brought to ED by EMS for right shoulder pain.   Existing Precautions/Restrictions fall   Right Upper Extremity (Weight-bearing Status) weight-bearing as tolerated (WBAT)   General Observations and Info Activity order: ambulate with assist 4x daily   Cognitive Status Examination   Cognitive Status Comments difficult to assess due to expressive aphasia; pt saying \"yes\" to water offers, followed 1 step directions with some repetition of commands   Pain Assessment   Patient Currently in Pain Yes, see Vital Sign flowsheet   Posture   Posture forward head position;protracted shoulders   Range of Motion Comprehensive   Comment, General Range of Motion pt demonstrated functional LUE AROM during transfers; RUE limited but able to flex shd enough to assist with bed mobility; arthritic changes B hands   Strength Comprehensive (MMT)   Comment, General Manual Muscle Testing (MMT) Assessment LUE generally 4/5   Coordination   Functional Limitations Impaired ability to perform bilateral tasks   Bed Mobility   Bed Mobility scooting/bridging;supine-sit;sit-supine   Scooting/Bridging [place] (Bed Mobility) maximum assist (25% patient effort);2 person assist   Supine-Sit [place] (Bed Mobility) minimum assist (75% patient effort)   Sit-Supine [place] (Bed Mobility) maximum assist (25% patient effort);2 person assist   Assistive Device (Bed Mobility) bed rails   Transfers   Transfers sit-stand transfer;toilet transfer   Sit-Stand Transfer   Sit-Stand [place] (Transfers) maximum assist (25% patient effort);2 person assist   Sit/Stand Transfer Comments B knees blocked   Toilet Transfer   Toilet Transfer Comments per friend, pt has been transferring to regular toilet at home   Balance   Balance Comments good seated; poor standing   Activities of Daily Living   BADL Assessment lower body " dressing;feeding   Lower Body Dressing Assessment   [place] Level (Lower Body Dressing) maximum assist (25% patient effort)   Eating/Self Feeding   [place] Level (Feeding) maximum assist (25% patient effort)   Clinical Impression   Criteria for Skilled Therapeutic Interventions Met (OT) yes;meets criteria;skilled treatment is necessary   OT Diagnosis impaired ADLs   OT Problem List-Impairments impacting ADL problems related to;activity tolerance impaired;communication;coordination;hearing;inability to direct their own care;mobility;range of motion (ROM);strength;pain   Assessment of Occupational Performance 3-5 Performance Deficits   Identified Performance Deficits toilet transfer, bed <> wheelchair transfer, bed mobiltiy   Planned Therapy Interventions (OT) strengthening;transfer training   Clinical Decision Making Complexity (OT) moderate complexity   Therapy Frequency (OT) 5x/week   Predicted Duration of Therapy 3 days   Risk & Benefits of therapy have been explained evaluation/treatment results reviewed;care plan/treatment goals reviewed;participants included;patient   OT Discharge Planning    OT Discharge Recommendation (DC Rec) Transitional Care Facility   OT Rationale for DC Rec Pt below baseline, limited by pain and weakness, and would benefit from continued therapy to increase safety and independence with functional transfers.  Pt has been living with friend, who has been assisting with all ADLs/transfers.  Pt currently needing Ax2 for transfers.     OT Brief overview of current status  Nursing use Lift for transfers.  Mod-MaxAx2 sit <> stand. MaxA dressing. MaxA feeding.    Therapy Certification   Start of Care Date 02/03/22   Certification date from 02/03/22   Certification date to 02/03/22   Medical Diagnosis RC tear   Total Evaluation Time (Minutes)   Total Evaluation Time (Minutes) 10

## 2022-02-04 PROCEDURE — 99225 PR SUBSEQUENT OBSERVATION CARE,LEVEL II: CPT | Performed by: INTERNAL MEDICINE

## 2022-02-04 PROCEDURE — 99207 PR CDG-MDM COMPONENT: MEETS MODERATE - UP CODED: CPT | Performed by: INTERNAL MEDICINE

## 2022-02-04 PROCEDURE — G0378 HOSPITAL OBSERVATION PER HR: HCPCS

## 2022-02-04 PROCEDURE — 250N000013 HC RX MED GY IP 250 OP 250 PS 637: Performed by: HOSPITALIST

## 2022-02-04 RX ADMIN — ACETAMINOPHEN 500 MG: 500 TABLET ORAL at 14:56

## 2022-02-04 RX ADMIN — ACETAMINOPHEN 500 MG: 500 TABLET ORAL at 22:15

## 2022-02-04 RX ADMIN — PANTOPRAZOLE SODIUM 40 MG: 40 TABLET, DELAYED RELEASE ORAL at 06:11

## 2022-02-04 RX ADMIN — ATENOLOL 50 MG: 50 TABLET ORAL at 09:53

## 2022-02-04 RX ADMIN — ACETAMINOPHEN 500 MG: 500 TABLET ORAL at 06:11

## 2022-02-04 RX ADMIN — CLOPIDOGREL BISULFATE 75 MG: 75 TABLET ORAL at 09:53

## 2022-02-04 RX ADMIN — AMLODIPINE BESYLATE 5 MG: 5 TABLET ORAL at 20:38

## 2022-02-04 RX ADMIN — SENNOSIDES AND DOCUSATE SODIUM 1 TABLET: 50; 8.6 TABLET ORAL at 09:53

## 2022-02-04 RX ADMIN — SENNOSIDES AND DOCUSATE SODIUM 1 TABLET: 50; 8.6 TABLET ORAL at 20:38

## 2022-02-04 NOTE — PROGRESS NOTES
[facility]    Medicine Progress Note - Hospitalist Service       Date of Admission:  2/1/2022  Assessment & Plan   Steffany Brown is a 89 year old female with PMHx significant for hypertension, CVA (with expressive aphasia, baseline wheelchair bound with right hemiparesis), dyslipidemia, GERD who was brought to ED by EMS for right shoulder pain. Admitted 2/1/2022     Right shoulder pain, probable large rotator cuff tear  *Presents with shoulder pain  *XR with superior migration of humeral head and concern for rotator cuff tear; no acute fracture or dislocation  *Normal troponin, CT chest noted with no PE  *Received right shoulder subacromial injection 2/2/22 with improvement in pain  - Orthopedic surgery consulted, injection as above, now signed off  - PT/OT consulted, anticipate will need placement for additional assistance  - Continue scheduled acetaminophen    Pyuria  UA with >182 WBC, large LE, negative nitrite. Started on ceftriaxone IV.   - Urine culture with <10k urogenital mini, will discontinue ceftriaxone IV     H/o CVA   Baseline expressive aphasia and right hemiplegia with wheelchair-bound status  *Patient lives independently with a friend Kamini; per Kamini's sister Isreal, Kamini herself has some memory issues and is unable to care for the patient   - Continue prior to admission clopidogrel  - SW following for placement      Hypertension  - Continue prior to admission amlodipine, atenolol  - Hydralazine IV PRN      Dyslipidemia  - Hold lovastatin while under observation status     GERD  - Continue prior to admission PPI (or formulary equivalent)    Clinically Significant Risk Factors Present on Admission               # Platelet Defect: home medication list includes an antiplatelet medication          Diet: Regular Diet Adult    DVT Prophylaxis: Pneumatic Compression Devices  Boland Catheter: Not present  Code Status: No CPR- Do NOT Intubate      Disposition Plan   Expected  Discharge: 02/05/2022     Anticipated discharge location:  Awaiting care coordination huddle  Delays:         Entered: Rishi Hooper MD 02/04/2022, 1:43 PM       The patient's care was discussed with the Patient.    Rishi Hooper MD  Hospitalist Service  [facility]    ______________________________________________________________________    Interval History   No acute events overnight. Unable to obtain history from patient due to her aphasia.     Data reviewed today: I reviewed all medications, new labs and imaging results over the last 24 hours. I personally reviewed no images or EKG's today.    Physical Exam   Vital Signs: Temp: 98.1  F (36.7  C) Temp src: Oral BP: (!) 164/73 Pulse: 56   Resp: 18 SpO2: 94 % O2 Device: None (Room air)    Weight: 107 lbs 12.88 oz    Constitutional: NAD  Respiratory: Normal respiratory effort at rest, non-labored breathing  Cardiovascular:   GI:    Skin/Integumen: Warm, dry  Neuro: Alert. Expressive aphasia.      Data   Recent Labs   Lab 02/03/22  0701 02/02/22  0651 02/02/22  0626 02/01/22  1350   WBC 11.1* 11.1*  --  15.4*   HGB 14.7 13.8  --  15.9*   MCV 91 93  --  92    372  --  365   NA  --   --  134 135   POTASSIUM  --   --  3.4 3.8   CHLORIDE  --   --  104 103   CO2  --   --  26 26   BUN  --   --  11 11   CR  --   --  0.52 0.49*   ANIONGAP  --   --  4 6   NICA  --   --  8.8 9.5   GLC  --   --  92 124*   ALBUMIN  --   --   --  3.6   PROTTOTAL  --   --   --  8.2   BILITOTAL  --   --   --  0.4   ALKPHOS  --   --   --  95   ALT  --   --   --  16   AST  --   --   --  17   LIPASE  --   --   --  123       No results found for this or any previous visit (from the past 24 hour(s)).  Medications       acetaminophen  500 mg Oral Q8H ABHILASH     amLODIPine  5 mg Oral QPM     atenolol  50 mg Oral QAM     cefTRIAXone  1 g Intravenous Q24H     clopidogrel  75 mg Oral Daily     pantoprazole  40 mg Oral QAM AC     senna-docusate  1 tablet Oral BID    Or      senna-docusate  2 tablet Oral BID

## 2022-02-04 NOTE — CONSULTS
Care Management Initial Consult    General Information  Assessment completed with: Family, Joy, niece, next-of-kin  Type of CM/SW Visit: Initial Assessment    Primary Care Provider verified and updated as needed:     Readmission within the last 30 days:        Reason for Consult: discharge planning  Advance Care Planning:            Communication Assessment  Patient's communication style: spoken language (English or Bilingual) (aphasia with stroke)    Hearing Difficulty or Deaf: yes   Wear Glasses or Blind: yes    Cognitive  Cognitive/Neuro/Behavioral: .WDL except,speech  Level of Consciousness: alert  Arousal Level: opens eyes spontaneously  Orientation: other (see comments) (RICKY)  Mood/Behavior: calm,cooperative  Best Language: 2 - Severe aphasia  Speech: word-finding difficulty    Living Environment:   People in home: friend(s)  Kamini  Current living Arrangements: house      Able to return to prior arrangements:         Family/Social Support:  Care provided by: friend  Provides care for: no one, unable/limited ability to care for self                Description of Support System:           Current Resources:   Patient receiving home care services:       Community Resources:    Equipment currently used at home: wheelchair, manual  Supplies currently used at home:      Employment/Financial:  Employment Status:          Financial Concerns:             Lifestyle & Psychosocial Needs:  Social Determinants of Health     Tobacco Use: Medium Risk     Smoking Tobacco Use: Former Smoker     Smokeless Tobacco Use: Never Used   Alcohol Use: Not on file   Financial Resource Strain: Medium Risk     Difficulty of Paying Living Expenses: Somewhat hard   Food Insecurity: No Food Insecurity     Worried About Running Out of Food in the Last Year: Never true     Ran Out of Food in the Last Year: Never true   Transportation Needs: No Transportation Needs     Lack of Transportation (Medical): No     Lack of Transportation  (Non-Medical): No   Physical Activity: Not on file   Stress: No Stress Concern Present     Feeling of Stress : Only a little   Social Connections: Not on file   Intimate Partner Violence: Not on file   Depression: Not at risk     PHQ-2 Score: 0   Housing Stability: Not on file       Functional Status:  Prior to admission patient needed assistance:              Mental Health Status:          Chemical Dependency Status:                Values/Beliefs:  Spiritual, Cultural Beliefs, Yazdanism Practices, Values that affect care:                 Additional Information:  JANET consulted to assist with discharge planning, emotional support and transportation arrangements.  JANET reviewed patient chart, discussed in rounds. Patient known to this writer from previous admissions.  Pt is a 89 yr old female who admitted on 2/1/22 with acute pain of right shoulder, aphasia due to history of CVA, leukocytosis.  Pt lives at home with her roommate Kamini who is her financial POA.   -Pt has no Health Care POA, therefore medical decisions fall to next of kin which is patient's niece Joy ([phone]).   -Joy continues to have concerns about patient's safety at home with her roommate who also presents with cognitive impairment.   -The roommate Kamini's niece and patient's niece are in agreement that it has not a safe home environment for some time.   -Roommate Kamini historically declines patient going to TCU after hospital admissions, preferring to care for patient at home. During hospital stay in 2021, JANET filed V.A. report upon her discharge to home.    JANET spoke with pt's niece Joy to discuss TCU options, per therapy recommendations. TCU referrals sent and pending.    JANET to continue to follow and assist with discharge planning.    GERSON Webb  Daytime (8:00am-4:30pm): [phone]  After-Hours JANET Pager (4:30pm-11:30pm): [phone]         GERSON Rock

## 2022-02-04 NOTE — PROGRESS NOTES
Patient is alert and calm. Unable to assess patient orientation. VSS stable on RA. Incontinent of B & B, pure wick connected and draining. Pain is managed with schedule tylenol. Every two hours turn. PIV saline locked, wheel chair bound. Patient is a regular diet, but needs assistant with feeding. Discharge pending TCU placement. Social work following.

## 2022-02-04 NOTE — PLAN OF CARE
Pain control,  Yes   orthopedics evaluation and clearance  Yes  Awaiting TCU placement  Nurse to notify provider when observation goals have been met and patient is ready for discharge    A, orientation unable to assess due to expressive aphasia. Pain in the R shoulder managed with scheduled tylenol, pain improving from yesterday. Turned and repoed Q 2hrs. Purewick in place with good UO. PIV Sled. Up with lift, WC bound at baseline.Regular diet, feeding assistance needed, discharge pending TCU placement.Continue to monitor.

## 2022-02-05 PROCEDURE — G0378 HOSPITAL OBSERVATION PER HR: HCPCS

## 2022-02-05 PROCEDURE — 250N000013 HC RX MED GY IP 250 OP 250 PS 637: Performed by: HOSPITALIST

## 2022-02-05 PROCEDURE — 99225 PR SUBSEQUENT OBSERVATION CARE,LEVEL II: CPT | Performed by: INTERNAL MEDICINE

## 2022-02-05 RX ADMIN — ATENOLOL 50 MG: 50 TABLET ORAL at 09:45

## 2022-02-05 RX ADMIN — SENNOSIDES AND DOCUSATE SODIUM 2 TABLET: 50; 8.6 TABLET ORAL at 20:09

## 2022-02-05 RX ADMIN — ACETAMINOPHEN 500 MG: 500 TABLET ORAL at 14:27

## 2022-02-05 RX ADMIN — PANTOPRAZOLE SODIUM 40 MG: 40 TABLET, DELAYED RELEASE ORAL at 06:33

## 2022-02-05 RX ADMIN — AMLODIPINE BESYLATE 5 MG: 5 TABLET ORAL at 20:09

## 2022-02-05 RX ADMIN — SENNOSIDES AND DOCUSATE SODIUM 2 TABLET: 50; 8.6 TABLET ORAL at 09:45

## 2022-02-05 RX ADMIN — CLOPIDOGREL BISULFATE 75 MG: 75 TABLET ORAL at 09:45

## 2022-02-05 RX ADMIN — ACETAMINOPHEN 500 MG: 500 TABLET ORAL at 06:33

## 2022-02-05 NOTE — PLAN OF CARE
Observation Goals:   Pain control: met   Orthopedics evaluation and clearance: met    Nurse to notify provider when observation goals have been met and patient is ready for discharge.

## 2022-02-05 NOTE — PLAN OF CARE
Pain control,  Yes   orthopedics evaluation and clearance  Yes  Awaiting TCU placement  Nurse to notify provider when observation goals have been met and patient is ready for discharge     A, orientation unable to assess due to expressive aphasia. Pain in the R shoulder managed with scheduled tylenol, not c/o much pain. Turned and repoed Q 2hrs. Purewick in place with good UO. PIV Sled. Up with lift, WC bound at baseline.Regular diet, feeding assistance provided. discharge pending TCU placement.Continue to monitor.

## 2022-02-05 NOTE — PLAN OF CARE
RICKY orientation due to expressive aphagia. VSS on RA ex HTN- parameters for PRN hydralazine not met. Total care, turn and repo Q2 hrs. Wheelchair bound at baseline. Purewick in place, good output. Feeding assistance required, able to swallow pills without difficulty. Pain managed with scheduled tylenol. Occasionally grimaces with repositioning, however appears to be sleeping comfortably through the night. PIV SL. Discharge pending TCU placement, SW following.

## 2022-02-05 NOTE — PROGRESS NOTES
Care Transitions SW Note  It appears patient will need LTC as her roommate Kamini is not longer able to care for patient and our therapists do not see patient as having restorative needs.   We need to reach out to Isreal to gather more information and likely switch the referrals to SNF  facilities with LTC capacity not TCU.

## 2022-02-05 NOTE — PROGRESS NOTES
[facility]    Medicine Progress Note - Hospitalist Service       Date of Admission:  2/1/2022  Assessment & Plan   Steffany Brown is a 89 year old female with PMHx significant for hypertension, CVA (with expressive aphasia, baseline wheelchair bound with right hemiparesis), dyslipidemia, GERD who was brought to ED by EMS for right shoulder pain. Admitted 2/1/2022     Right shoulder pain, probable large rotator cuff tear  *Presents with shoulder pain  *XR with superior migration of humeral head and concern for rotator cuff tear; no acute fracture or dislocation  *Normal troponin, CT chest noted with no PE  *Orthopedic surgery consulted, received right shoulder subacromial injection 2/2/22 with improvement in pain, orthopedic surgery now signed off  - PT/OT consulted, anticipate will need placement for additional assistance  - Continue scheduled acetaminophen    Pyuria  *UA with >182 WBC, large LE, negative nitrite. Started on ceftriaxone IV.   *Urine culture with <10k urogenital mini, antibiotics discontinued     H/o CVA   Baseline expressive aphasia and right hemiplegia with wheelchair-bound status  *Patient lives independently with a friend Kamini; per Kamini's sister Isreal, Kamini herself has some memory issues and is unable to care for the patient   - Continue prior to admission clopidogrel  - SW following for placement      Hypertension  - Continue prior to admission amlodipine, atenolol  - Hydralazine IV PRN      Dyslipidemia  - Hold lovastatin while under observation status     GERD  - Continue prior to admission PPI (or formulary equivalent)    Clinically Significant Risk Factors Present on Admission               # Platelet Defect: home medication list includes an antiplatelet medication          Diet: Regular Diet Adult    DVT Prophylaxis: Pneumatic Compression Devices  Boland Catheter: Not present  Code Status: No CPR- Do NOT Intubate      Disposition Plan   Expected  Discharge: 02/05/2022     Anticipated discharge location: inpatient rehabilitation facility    Delays:         Entered: Rishi Hooper MD 02/05/2022, 8:48 AM       The patient's care was discussed with the Patient.    Rishi Hooper MD  Hospitalist Service  [facility]    ______________________________________________________________________    Interval History   No acute events overnight. Unable to obtain history from patient due to her aphasia. Denies any worsening pain.     Data reviewed today: I reviewed all medications, new labs and imaging results over the last 24 hours. I personally reviewed no images or EKG's today.    Physical Exam   Vital Signs: Temp: 97.8  F (36.6  C) Temp src: Axillary BP: (!) 146/74 Pulse: 60   Resp: 18 SpO2: 97 % O2 Device: None (Room air)    Weight: 107 lbs 12.88 oz    Constitutional: NAD  Respiratory: Normal respiratory effort at rest, non-labored breathing  Cardiovascular:   GI:    Skin/Integumen: Warm, dry  Neuro: Alert. Expressive aphasia.      Data   Recent Labs   Lab 02/03/22  0701 02/02/22  0651 02/02/22  0626 02/01/22  1350   WBC 11.1* 11.1*  --  15.4*   HGB 14.7 13.8  --  15.9*   MCV 91 93  --  92    372  --  365   NA  --   --  134 135   POTASSIUM  --   --  3.4 3.8   CHLORIDE  --   --  104 103   CO2  --   --  26 26   BUN  --   --  11 11   CR  --   --  0.52 0.49*   ANIONGAP  --   --  4 6   NICA  --   --  8.8 9.5   GLC  --   --  92 124*   ALBUMIN  --   --   --  3.6   PROTTOTAL  --   --   --  8.2   BILITOTAL  --   --   --  0.4   ALKPHOS  --   --   --  95   ALT  --   --   --  16   AST  --   --   --  17   LIPASE  --   --   --  123       No results found for this or any previous visit (from the past 24 hour(s)).  Medications       acetaminophen  500 mg Oral Q8H ABHILASH     amLODIPine  5 mg Oral QPM     atenolol  50 mg Oral QAM     clopidogrel  75 mg Oral Daily     pantoprazole  40 mg Oral QAM AC     senna-docusate  1 tablet Oral BID    Or      senna-docusate  2 tablet Oral BID

## 2022-02-06 PROCEDURE — 250N000013 HC RX MED GY IP 250 OP 250 PS 637: Performed by: HOSPITALIST

## 2022-02-06 PROCEDURE — 99225 PR SUBSEQUENT OBSERVATION CARE,LEVEL II: CPT | Performed by: INTERNAL MEDICINE

## 2022-02-06 PROCEDURE — G0378 HOSPITAL OBSERVATION PER HR: HCPCS

## 2022-02-06 RX ADMIN — CLOPIDOGREL BISULFATE 75 MG: 75 TABLET ORAL at 09:08

## 2022-02-06 RX ADMIN — AMLODIPINE BESYLATE 5 MG: 5 TABLET ORAL at 20:10

## 2022-02-06 RX ADMIN — ACETAMINOPHEN 500 MG: 500 TABLET ORAL at 09:08

## 2022-02-06 RX ADMIN — ACETAMINOPHEN 500 MG: 500 TABLET ORAL at 22:18

## 2022-02-06 RX ADMIN — ACETAMINOPHEN 650 MG: 325 TABLET, FILM COATED ORAL at 04:27

## 2022-02-06 RX ADMIN — PANTOPRAZOLE SODIUM 40 MG: 40 TABLET, DELAYED RELEASE ORAL at 09:08

## 2022-02-06 RX ADMIN — SENNOSIDES AND DOCUSATE SODIUM 2 TABLET: 50; 8.6 TABLET ORAL at 09:08

## 2022-02-06 RX ADMIN — ATENOLOL 50 MG: 50 TABLET ORAL at 09:08

## 2022-02-06 RX ADMIN — SENNOSIDES AND DOCUSATE SODIUM 2 TABLET: 50; 8.6 TABLET ORAL at 20:10

## 2022-02-06 NOTE — PROGRESS NOTES
Observation Goals:   Pain control: met   Orthopedics evaluation and clearance: met      Awaiting TCU vs LTC placement

## 2022-02-06 NOTE — PLAN OF CARE
Observation Goals:   Pain control: met   Orthopedics evaluation and clearance: met      Awaiting TCU placement

## 2022-02-06 NOTE — PLAN OF CARE
Pain control,  Yes   orthopedics evaluation and clearance  Yes  Awaiting TCU placement    A but orientation RICKY 2/2 hxt of expressive aphasia. VSS on RA. C/o some jaw pain in the morning which subsided as the day wore on, no problems with chewing during meal time. Turned and repoed Q hrs.Purewick in place. Up AX2 with lift. Discharge to TCU Vs LTC pending placement. PIV Sled. Continue to monitor.

## 2022-02-06 NOTE — PLAN OF CARE
RICKY orientation due to expressive aphagia. VSS on RA ex HTN, order parameters not met for PRN hydralazine. Total care, turn and repo Q2 hrs. Wheelchair bound at baseline. Purewick in place, good output. Feeding assistance required, able to swallow pills without difficulty. Pain managed with tylenol and cold packs to R shoulder. PIV SL. Discharge pending TCU placement, SW following. Continue to monitor.

## 2022-02-06 NOTE — PROGRESS NOTES
[facility]    Medicine Progress Note - Hospitalist Service       Date of Admission:  2/1/2022  Assessment & Plan   Steffany Brown is a 89 year old female with PMHx significant for hypertension, CVA (with expressive aphasia, baseline wheelchair bound with right hemiparesis), dyslipidemia, GERD who was brought to ED by EMS for right shoulder pain. Admitted 2/1/2022     Right shoulder pain, probable large rotator cuff tear  *Presents with shoulder pain  *XR with superior migration of humeral head and concern for rotator cuff tear; no acute fracture or dislocation  *Normal troponin, CT chest noted with no PE  *Orthopedic surgery consulted, received right shoulder subacromial injection 2/2/22 with improvement in pain, orthopedic surgery now signed off  - PT/OT consulted, anticipate will need placement for additional assistance  - Continue scheduled acetaminophen    Pyuria  *UA with >182 WBC, large LE, negative nitrite. Started on ceftriaxone IV.   *Urine culture with <10k urogenital mini, antibiotics discontinued     H/o CVA   Baseline expressive aphasia and right hemiplegia with wheelchair-bound status  *Patient lives independently with a friend Kamini; per Kamini's sister Isreal, Kamini herself has some memory issues and is unable to care for the patient   - Continue prior to admission clopidogrel  - SW following for placement      Hypertension  - Continue prior to admission amlodipine, atenolol  - Hydralazine IV PRN      Dyslipidemia  - Hold lovastatin while under observation status     GERD  - Continue prior to admission PPI (or formulary equivalent)    Clinically Significant Risk Factors Present on Admission               # Platelet Defect: home medication list includes an antiplatelet medication          Diet: Regular Diet Adult    DVT Prophylaxis: Pneumatic Compression Devices  Boland Catheter: Not present  Code Status: No CPR- Do NOT Intubate      Disposition Plan   Expected  Discharge: 02/06/2022     Anticipated discharge location: inpatient rehabilitation facility    Delays:         Entered: Rishi Hooper MD 02/06/2022, 9:16 AM       The patient's care was discussed with the Patient.    Rishi Hooper MD  Hospitalist Service  [facility]    ______________________________________________________________________    Interval History   No acute events overnight. Unable to obtain history from patient due to her aphasia. Denies any worsening pain.     Data reviewed today: I reviewed all medications, new labs and imaging results over the last 24 hours. I personally reviewed no images or EKG's today.    Physical Exam   Vital Signs: Temp: 97.7  F (36.5  C) Temp src: Oral BP: 138/65 Pulse: 50   Resp: 18 SpO2: 95 % O2 Device: None (Room air)    Weight: 107 lbs 12.88 oz    Constitutional: NAD  Respiratory: Normal respiratory effort at rest, non-labored breathing  Cardiovascular:   GI:    Skin/Integumen: Warm, dry  Neuro: Alert. Expressive aphasia.      Data   Recent Labs   Lab 02/03/22  0701 02/02/22  0651 02/02/22  0626 02/01/22  1350   WBC 11.1* 11.1*  --  15.4*   HGB 14.7 13.8  --  15.9*   MCV 91 93  --  92    372  --  365   NA  --   --  134 135   POTASSIUM  --   --  3.4 3.8   CHLORIDE  --   --  104 103   CO2  --   --  26 26   BUN  --   --  11 11   CR  --   --  0.52 0.49*   ANIONGAP  --   --  4 6   NICA  --   --  8.8 9.5   GLC  --   --  92 124*   ALBUMIN  --   --   --  3.6   PROTTOTAL  --   --   --  8.2   BILITOTAL  --   --   --  0.4   ALKPHOS  --   --   --  95   ALT  --   --   --  16   AST  --   --   --  17   LIPASE  --   --   --  123       No results found for this or any previous visit (from the past 24 hour(s)).  Medications       acetaminophen  500 mg Oral Q8H ABHILASH     amLODIPine  5 mg Oral QPM     atenolol  50 mg Oral QAM     clopidogrel  75 mg Oral Daily     pantoprazole  40 mg Oral QAM AC     senna-docusate  1 tablet Oral BID    Or     senna-docusate   2 tablet Oral BID

## 2022-02-07 VITALS
OXYGEN SATURATION: 96 % | TEMPERATURE: 98 F | BODY MASS INDEX: 21.05 KG/M2 | DIASTOLIC BLOOD PRESSURE: 62 MMHG | WEIGHT: 107.81 LBS | HEART RATE: 54 BPM | SYSTOLIC BLOOD PRESSURE: 122 MMHG | RESPIRATION RATE: 18 BRPM

## 2022-02-07 PROCEDURE — 99217 PR OBSERVATION CARE DISCHARGE: CPT | Performed by: INTERNAL MEDICINE

## 2022-02-07 PROCEDURE — 250N000013 HC RX MED GY IP 250 OP 250 PS 637: Performed by: HOSPITALIST

## 2022-02-07 PROCEDURE — G0378 HOSPITAL OBSERVATION PER HR: HCPCS

## 2022-02-07 RX ADMIN — ACETAMINOPHEN 500 MG: 500 TABLET ORAL at 06:17

## 2022-02-07 RX ADMIN — SENNOSIDES AND DOCUSATE SODIUM 1 TABLET: 50; 8.6 TABLET ORAL at 08:06

## 2022-02-07 RX ADMIN — ACETAMINOPHEN 500 MG: 500 TABLET ORAL at 13:23

## 2022-02-07 RX ADMIN — PANTOPRAZOLE SODIUM 40 MG: 40 TABLET, DELAYED RELEASE ORAL at 06:17

## 2022-02-07 RX ADMIN — ATENOLOL 50 MG: 50 TABLET ORAL at 08:06

## 2022-02-07 RX ADMIN — CLOPIDOGREL BISULFATE 75 MG: 75 TABLET ORAL at 08:06

## 2022-02-07 NOTE — PLAN OF CARE
Observation Goals:   Pain control: met   Orthopedics evaluation and clearance: met      Awaiting TCU vs LTC placement     A, orientation RICKY 2/2 hxt of expressive aphasia, pain managed with scheduled tylenol. Up with lift. Regular diet. Needs feeding assistance. Turned and repoed Q 2hrs. Purewick in place. Discharge pending TCU vs LTC placement. Continue to monitor.

## 2022-02-07 NOTE — DISCHARGE SUMMARY
Care Management Discharge Note    Discharge Date: 02/07/2022       Discharge Disposition: Skilled Nursing Facilty    Discharge Services:      Discharge DME:      Discharge Transportation: agency    Private pay costs discussed: Not applicable    PAS Confirmation Code:  [id number]  Patient/family educated on Medicare website which has current facility and service quality ratings: yes    Education Provided on the Discharge Plan:    Persons Notified of Discharge Plans: patient  Patient/Family in Agreement with the Plan: yes    Handoff Referral Completed: Yes    Additional Information:  Completed PAS, faxed to facility.    PAS-RR    D: Per DHS regulation, SW completed and submitted PAS-RR to [place] [employer] Direct Connect via the Senior [employer] Line.  PAS-RR confirmation # is : [id number]    I: SW spoke with patient and they are aware a PAS-RR has been submitted.  SW reviewed with patient that they may be contacted for a follow up appointment within 10 days of hospital discharge if their SNF stay is < 30 days.  Contact information for Senior [employer] was also provided.    A: Patient verbalized understanding.    P: Further questions may be directed to Senior [employer] Line at #[phone], option #4 for PAS-RR staff.    GERSON Angelo

## 2022-02-07 NOTE — PLAN OF CARE
RICKY orientation d/t aphasia. Tearful at times when difficult to express self. VSS, on RA.  C/o R shoulder pain, scheduled Tylenol given.  [facility], T/R q2 hrs, up w/ lift.  Incontinent of bowel and bladder, purewick in place.  BS hypoactive, no BM since 2/1, 2 senna given.  Tolerating regular diet, swallows pills fine. PT/OT/SW following. Discharge to TCU vs LTC pending placement.    Observation goals:  Pain control-Met   orthopedics evaluation and clearance-Met  Awaiting placement.   Nurse to notify provider when observation goals have been met and patient is ready for discharge.

## 2022-02-07 NOTE — PROGRESS NOTES
Pain control: met   Orthopedics evaluation and clearance: met      Has a bed at [facility], plan to discharge at 1400 PM today. Roommate Kamini aware of the plan.

## 2022-02-07 NOTE — PROGRESS NOTES
Care Management Discharge Note    Discharge Date: 02/07/2022       Discharge Disposition: Skilled Nursing Facilty- [facility]    Discharge Services:      Discharge DME:      Discharge Transportation: agency    Private pay costs discussed: transportation costs and insurance costs out of pocket expenses and co-pays    PAS Confirmation Code:    Patient/family educated on Medicare website which has current facility and service quality ratings: yes    Education Provided on the Discharge Plan:    Persons Notified of Discharge Plans: leah Martinez, medical team  Patient/Family in Agreement with the Plan: yes    Handoff Referral Completed: No    Additional Information:  Patient accepted at [facility] TC. SW spoke to leah Joy who is in agreement and who will let patient's roommate Kamini's sister Isreal know, who will in turn inform Kamini of plan.    DC orders: sent via DOD at 1400  Scripts:  PAS:  Trans: 1400    SW to continue to follow and assist with discharge planning.    GERSON Webb  Daytime (8:00am-4:30pm): [phone]  After-Hours SW Pager (4:30pm-11:30pm): [phone]               GERSON Rock

## 2022-02-07 NOTE — DISCHARGE SUMMARY
[facility]  Hospitalist Discharge Summary       Date of Admission:  2/1/2022  Date of Discharge:  2/7/2022  Discharging Provider: Rishi Hooper MD      Discharge Diagnoses   Right shoulder pain, probable large rotator cuff tear  Pyuria  H/o CVA   Baseline expressive aphasia and right hemiplegia with wheelchair-bound status  Hypertension  Dyslipidemia  GERD    Follow-ups Needed After Discharge   Follow-up Appointments     Follow Up and recommended labs and tests      Follow up with Nursing home physician.  No follow up labs or test are   needed. Follow-up with shoulder specialist through [facility]   in outpatient setting if continued pain             Hospital Course   Steffany Brown is a 89 year old female with PMHx significant for hypertension, CVA (with expressive aphasia, baseline wheelchair bound with right hemiparesis), dyslipidemia, GERD who was brought to ED by EMS for right shoulder pain. Admitted 2/1/2022     Right shoulder pain, probable large rotator cuff tear  *Presents with shoulder pain  *XR with superior migration of humeral head and concern for rotator cuff tear; no acute fracture or dislocation  *Normal troponin, CT chest noted with no PE  *Orthopedic surgery consulted, received right shoulder subacromial injection 2/2/22 with improvement in pain, orthopedic surgery now signed off  - PT/OT consulted, discharged to TCU for ongoing therapy  - Continue scheduled acetaminophen  - Follow-up with shoulder specialist through TCU if ongoing pain     Pyuria  *UA with >182 WBC, large LE, negative nitrite. Started on ceftriaxone IV.   *Urine culture with <10k urogenital mini, antibiotics discontinued     H/o CVA   Baseline expressive aphasia and right hemiplegia with wheelchair-bound status  *Patient lives independently with a friend Kamini; per Kamini's sister Isreal, Kamini herself has some memory issues and is unable to care for the patient   - Continue prior to  admission clopidogrel      Hypertension  - Continue prior to admission amlodipine, atenolol     Dyslipidemia  - Continue prior to admission lovastatin      GERD  - Continue prior to admission PPI       Consultations This Hospital Stay   ORTHOPEDIC SURGERY IP CONSULT  CARE MANAGEMENT / SOCIAL WORK IP CONSULT  PHYSICAL THERAPY ADULT IP CONSULT  OCCUPATIONAL THERAPY ADULT IP CONSULT  OCCUPATIONAL THERAPY ADULT IP CONSULT  PHYSICAL THERAPY ADULT IP CONSULT    Code Status   No CPR- Do NOT Intubate    Time Spent on this Encounter   I, Rishi Hooper MD, personally saw the patient today and spent greater than 30 minutes discharging this patient.       Rishi Hooper MD  [facility]  ______________________________________________________________________    Physical Exam   Vital Signs: Temp: 98  F (36.7  C) Temp src: Oral BP: 122/62 Pulse: 54   Resp: 18 SpO2: 96 % O2 Device: None (Room air)    Weight: 107 lbs 12.88 oz    Constitutional:  NAD  Respiratory: Normal respiratory effort, nonlabored breathing at rest  Cardiovascular: RRR. No peripheral edema.  GI: Soft, non-tender, non-distended.    Skin/Integumen: Warm, dry  Neuro: Alert. Expressive aphasia. Right hemiparesis.        Primary Care Physician   Leo Hancock    Discharge Disposition   Discharged to rehabilitation facility  Condition at discharge: Stable      Discharge Orders      General info for SNF    Length of Stay Estimate: Short Term Care: Estimated # of Days <30  Condition at Discharge: Improving  Level of care:skilled   Rehabilitation Potential: Fair  Admission H&P remains valid and up-to-date: Yes  Recent Chemotherapy: N/A  Use Nursing Home Standing Orders: Yes     Mantoux instructions    Give two-step Mantoux (PPD) Per Facility Policy Yes     Follow Up and recommended labs and tests    Follow up with Nursing home physician.  No follow up labs or test are needed. Follow-up with shoulder specialist through [facility]  Orth[facility] in outpatient setting if continued pain     Reason for your hospital stay    You were hospitalized for right shoulder pain likely due to a rotator cuff tear     Activity - Up with nursing assistance     Occupational Therapy Adult Consult    Evaluate and treat as clinically indicated.    Reason:  Physical deconditioning     Physical Therapy Adult Consult    Evaluate and treat as clinically indicated.    Reason:  Physical deconditioning     Diet    Follow this diet upon discharge: Orders Placed This Encounter      Regular Diet Adult     Discharge Medications   Current Discharge Medication List      CONTINUE these medications which have NOT CHANGED    Details   acetaminophen (TYLENOL) 500 MG tablet Take 500-1,000 mg by mouth every 6 hours as needed       amLODIPine (NORVASC) 5 MG tablet Take 1 tablet (5 mg) by mouth every evening  Qty: 90 tablet, Refills: 1    Associated Diagnoses: Benign essential hypertension      atenolol (TENORMIN) 50 MG tablet Take 1 tablet (50 mg) by mouth every morning  Qty: 90 tablet, Refills: 1    Associated Diagnoses: Benign essential hypertension      clopidogrel (PLAVIX) 75 MG tablet Take 1 tablet (75 mg) by mouth daily  Qty: 90 tablet, Refills: 3    Associated Diagnoses: 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8 15.9*   MCV 91 93 92    372 365      Most Recent 3 BMP's:  Recent Labs   Lab Test 02/02/22  0626 02/01/22  1350 09/03/21  1143    135 128*   POTASSIUM 3.4 3.8 4.2   CHLORIDE 104 103 97   CO2 26 26 28   BUN 11 11 8   CR 0.52 0.49* 0.54   ANIONGAP 4 6 3   NICA 8.8 9.5 10.0   GLC 92 124* 93     Most Recent 2 LFT's:  Recent Labs   Lab Test 02/01/22  1350 07/07/21  1007   AST 17 21   ALT 16 18   ALKPHOS 95 115   BILITOTAL 0.4 0.6     Most Recent 3 INR's:  Recent Labs   Lab Test 01/11/17  1812 07/10/14  2153   INR 0.98 1.02     Most Recent 3 Troponin's:  Recent Labs   Lab Test 05/10/21  1900 05/10/21  1346 05/10/21  0828   TROPI <0.015 <0.015 <0.015     Most Recent 3 BNP's:  Recent Labs   Lab Test 05/10/21  0828   NTBNPI 692     Most Recent Cholesterol Panel:  Recent Labs   Lab Test 03/03/21  1143   CHOL 209*   *   HDL 55   TRIG 128     Most Recent 6 Bacteria Isolates From Any Culture (See EPIC Reports for Culture Details):  Recent Labs   Lab Test 07/07/21  2242 05/10/21  0829 07/20/16  0758 06/30/14  2000   CULT >100,000 colonies/mL  Escherichia coli  * 50,000 to 100,000 colonies/mL  Escherichia coli  * >100,000 colonies/mL Enterobacter cloacae complex* >100,000 colonies/mL Escherichia coli*     Most Recent TSH and T4:  Recent Labs   Lab Test 07/07/21  1007   TSH 1.39     Most Recent Hemoglobin A1c:No lab results found.  Most Recent Urinalysis:  Recent Labs   Lab Test 02/02/22  1138 05/10/21  0828 10/18/17  1637   COLOR Light Yellow   < > Yellow   APPEARANCE Slightly Cloudy*   < > Clear   URINEGLC Negative   < > Negative   URINEBILI Negative   < > Negative   URINEKETONE Negative   < > Negative   SG 1.024   < > 1.010   UBLD Small*   < > Negative   URINEPH 6.5   < > 7.0   PROTEIN 20 *   < > Negative   UROBILINOGEN  --   --  0.2   NITRITE Negative   < > Negative   LEUKEST Large*   < > Negative   RBCU 3*   < >  --    WBCU >182*   < >  --     < > = values in this interval not displayed.     Most Recent ABG:No lab results found.  Most Recent ESR &  CRP:No lab results found.,   Results for orders placed or performed during the hospital encounter of 02/01/22   Humerus XR, G/E 2 views, right    Narrative    HUMERUS RIGHT TWO OR MORE VIEWS  DATE/TIME: 2/1/2022 2:44 PM    INDICATION: Pain, unclear if trauma.  COMPARISON: None available.      Impression    IMPRESSION: Superior migration of the humeral head, suggestive of  rotator cuff tear. Narrowing of the acromiohumeral space. No acute  right humerus fracture or miguel angel dislocation identified. 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 Numerous surgical  clips present in the ventral right elbow. Diffuse bone  demineralization. Chronic healed posterolateral right-sided rib  fracture deformities.    JOSE PAULA MD         SYSTEM ID:  [id number]   CT Chest Pulmonary Embolism w Contrast    Narrative    CT CHEST PULMONARY EMBOLISM WITH CONTRAST 2/1/2022 4:25 PM    CLINICAL HISTORY: PE suspected, low/intermediate prob, positive  D-dimer; Chest pain/Right shoulder pain, elevated D-dimer.    TECHNIQUE: CT angiogram chest during arterial phase injection IV  contrast. 2D and 3D MIP reconstructions were performed by the CT  technologist. Dose reduction techniques were used.     CONTRAST: 54 mL Isovue-[id number]    COMPARISON: CT aortic survey dated 5/10/2021.    FINDINGS:  ANGIOGRAM CHEST: Pulmonary arteries are normal caliber and negative  for pulmonary emboli. Thoracic aorta is negative for dissection. No CT  evidence of right heart strain.    LUNGS AND PLEURA: Mild patchy groundglass densities in bilateral lungs  are similar to the prior CT aortic survey and likely represent  atelectasis. Lungs are otherwise grossly clear. No significant nodule,  mass, infiltrate, effusion, or pneumothorax. Pleura is unremarkable.    MEDIASTINUM/AXILLAE: Heart is upper normal size. Extensive coronary  artery calcifications and/or stents are noted. Thoracic aorta is  grossly of normal caliber. There are thoracic aortic  calcifications  but no evidence for dissection or aneurysm. Probable small penetrating  ulcer in the left lateral aspect of the descending thoracic aorta at  level of the hiatus (image 208 series 4) is similar to the prior CT  angiogram dated 5/10/2021. No mediastinal, hilar, or axillary  lymphadenopathy is identified. Hypoattenuating structure in the left  axilla (image 50 series 4) measures approximately 2.1 x 2.7 cm  transaxially and 4.2 cm in craniocaudal dimension (image 36 series  10). This was not definitely seen on the prior CT examination and  could represent a hematoma, seroma, or small abscess. Recommend  clinical correlation. Thyroid is not well-seen on this study. Motion  artifact limits evaluation of the pulmonary arteries of the bilateral  lower lobes.    UPPER ABDOMEN: There is nonaneurysmal atherosclerosis. Radiopacities  in left intrarenal collecting system likely represent contrast  excretion. Visualized portions of the upper abdominal contents are  otherwise unremarkable.    MUSCULOSKELETAL: No aggressive osseous lesions or acute osseous  fractures are identified. There are degenerative changes in the spine  and left shoulder.      Impression    IMPRESSION:  1.  No evidence for pulmonary artery embolism.  2.  Extensive atherosclerosis. This includes coronary artery  calcifications but no definite dissection or aneurysm. There is a  small focal penetrating ulcer left lateral aspect of the ascending  thoracic aorta at the level of the hiatus (similar to the prior CT  from 5/10/2021).  3.  Patchy groundglass densities in the lungs likely represent  atelectasis.  4.  Hypoattenuating lesion in the left axilla is new since the prior  study and likely represents a small hematoma, seroma, small abscess,  or other necrotic lesion. Recommend clinical correlation. Ultrasound  may be helpful as clinically indicated.  5.  No definite lymphadenopathy is seen in the chest or axillary  regions.  6.  Motion  artifact limits evaluation of the posterior lungs and  posterior pulmonary arteries.    RICKY STAFFORD MD         SYSTEM ID:  [id number]       Allergies   Allergies   Allergen Reactions     Lipitor [Atorvastatin Calcium] Cramps     Leg cramps     Lisinopril Swelling and Rash     Seasonal Allergies

## 2022-02-07 NOTE — PLAN OF CARE
Pain control: met   Orthopedics evaluation and clearance: met   Pt is alert, orientation RICKY, hxt of expressive aphasia. VSS on RA. Up with lift.Pain in the R shoulder managed with scheduled tylenol. Regular diet with good appetite. Both pt and her friends Kamini and Isreal aware of pt's discharge plan to [facility] at 1400 PM. All belongings returned to pt. Pt stable at time of discharge.

## 2022-02-07 NOTE — PROGRESS NOTES
Observation goals:  Pain control-Met   orthopedics evaluation and clearance-Met  Awaiting placement.   Nurse to notify provider when observation goals have been met and patient is ready for discharge.

## 2022-02-07 NOTE — PLAN OF CARE
PT: Patient discharging at 2pm today.    Physical Therapy Discharge Summary    Reason for therapy discharge:    Discharged to long term care facility.    Progress towards therapy goal(s). See goals on Care Plan in [facility] electronic health record for goal details.  Goals not met.  Barriers to achieving goals:   discharge from facility.    Therapy recommendation(s):    No further therapy is recommended.

## 2022-02-08 ENCOUNTER — PATIENT OUTREACH (OUTPATIENT)
Dept: CARE COORDINATION | Facility: CLINIC | Age: 87
End: 2022-02-08
Payer: MEDICARE

## 2022-02-08 NOTE — PROGRESS NOTES
Clinic Care Coordination Contact  Care Coordination Transition Communication    Referral Source: IP Report    Clinical Data: Patient was hospitalized at [facility] from 2/1/2022 to 2/7/2022 with diagnosis of  Aphasia, Hx of CVA, acute pain of right shoulder.     Transition to Facility:              Facility Name: [facility]              Contact name and phone number/fax: [phone]    Plan: RN/SW Care Coordinator will await notification from facility staff informing RN/SW Care Coordinator of patient's discharge plans/needs. RN/SW Care Coordinator will review chart and outreach to facility staff every 4 weeks and as needed.     Arleen Mcguire RN, BSN, PHN  Primary Care / Care Coordinator   [facility]  E-mail [email or web address]   [phone]

## 2022-02-08 NOTE — LETTER
[facility]   To:   [facility]'s TCU          Please give to facility    From:   Arleen Mcguire RN  Care Coordinator   [facility]   P: [phone]  [email or web address]   Patient Name:  Steffany Brown YOB: 1930   Admit date: 2/7/2022      *Information Needed:  Please contact me when the patient will discharge (or if they will move to long term care)- include the discharge date, disposition, and main diagnosis   - If the patient is discharged with home care services, please provide the name of the agency    Also- Please inform me if a care conference is being held.   Phone, Fax or Email with information        Arleen Mcguire RN, BSN, PHN  Primary Care / Care Coordinator   [facility]  E-mail [email or web address]   [phone]                Thank you

## 2022-03-08 ENCOUNTER — PATIENT OUTREACH (OUTPATIENT)
Dept: CARE COORDINATION | Facility: CLINIC | Age: 87
End: 2022-03-08
Payer: MEDICARE

## 2022-03-08 NOTE — PROGRESS NOTES
Clinic Care Coordination Contact    Situation: Patient chart reviewed by care coordinator.    Background:   Patient was admitted to [facility] on 2/1/2022 with a diagnoses of Aphasia, Hx of CVA, acute pain of right shoulder and [facility].    Assessment:   RNCC reached out to admissions at [facility] and patient is currently receiving Physical Therapy/OT in hope of going home in the near future.    Plan/Recommendations:   RN/SW Care Coordinator will await notification from facility staff informing RN/SW Care Coordinator of patient's discharge plans/needs. RN/SW Care Coordinator will review chart and outreach to facility staff every 4 weeks and as needed.      Arleen Mcguire RN, BSN, PHN  Primary Care / Care Coordinator   [facility]  E-mail [email or web address]   [phone]

## 2022-04-16 ENCOUNTER — HEALTH MAINTENANCE LETTER (OUTPATIENT)
Age: 87
End: 2022-04-16

## (undated) RX ORDER — REGADENOSON 0.08 MG/ML
INJECTION, SOLUTION INTRAVENOUS
Status: DISPENSED
Start: 2021-05-11